# Patient Record
Sex: MALE | Race: WHITE | NOT HISPANIC OR LATINO
[De-identification: names, ages, dates, MRNs, and addresses within clinical notes are randomized per-mention and may not be internally consistent; named-entity substitution may affect disease eponyms.]

---

## 2017-09-11 ENCOUNTER — APPOINTMENT (OUTPATIENT)
Dept: VASCULAR SURGERY | Facility: CLINIC | Age: 62
End: 2017-09-11
Payer: MEDICAID

## 2017-09-11 VITALS
TEMPERATURE: 98 F | DIASTOLIC BLOOD PRESSURE: 94 MMHG | BODY MASS INDEX: 28.93 KG/M2 | HEART RATE: 70 BPM | HEIGHT: 66 IN | WEIGHT: 180 LBS | SYSTOLIC BLOOD PRESSURE: 167 MMHG

## 2017-09-11 DIAGNOSIS — N18.6 END STAGE RENAL DISEASE: ICD-10-CM

## 2017-09-11 PROCEDURE — G0365: CPT

## 2017-09-11 PROCEDURE — 99204 OFFICE O/P NEW MOD 45 MIN: CPT | Mod: 25

## 2017-09-11 RX ORDER — CALCIUM ACETATE 667 MG/1
667 CAPSULE ORAL
Qty: 90 | Refills: 0 | Status: ACTIVE | COMMUNITY
Start: 2017-03-26

## 2017-09-11 RX ORDER — AMPICILLIN TRIHYDRATE 250 MG
1000 CAPSULE ORAL
Qty: 30 | Refills: 0 | Status: ACTIVE | COMMUNITY
Start: 2017-03-21

## 2017-09-11 RX ORDER — ERGOCALCIFEROL 1.25 MG/1
1.25 MG CAPSULE, LIQUID FILLED ORAL
Qty: 4 | Refills: 0 | Status: ACTIVE | COMMUNITY
Start: 2017-04-17

## 2017-09-11 RX ORDER — ERYTHROPOIETIN 20000 [IU]/ML
20000 INJECTION, SOLUTION INTRAVENOUS; SUBCUTANEOUS
Qty: 4 | Refills: 0 | Status: ACTIVE | COMMUNITY
Start: 2017-04-05

## 2017-09-11 RX ORDER — DOCUSATE SODIUM 100 MG/1
100 CAPSULE, LIQUID FILLED ORAL
Qty: 30 | Refills: 0 | Status: ACTIVE | COMMUNITY
Start: 2017-04-17

## 2017-09-11 RX ORDER — FLUTICASONE PROPIONATE 50 UG/1
50 SPRAY, METERED NASAL
Qty: 16 | Refills: 0 | Status: ACTIVE | COMMUNITY
Start: 2017-03-30

## 2017-09-11 RX ORDER — SODIUM BICARBONATE 650 MG/1
650 TABLET ORAL
Qty: 120 | Refills: 0 | Status: ACTIVE | COMMUNITY
Start: 2017-04-05

## 2017-09-11 RX ORDER — PARICALCITOL 2 UG/1
2 CAPSULE ORAL
Qty: 30 | Refills: 0 | Status: ACTIVE | COMMUNITY
Start: 2017-03-26

## 2017-09-11 RX ORDER — CHLORHEXIDINE GLUCONATE 4 %
325 (65 FE) LIQUID (ML) TOPICAL
Qty: 30 | Refills: 0 | Status: ACTIVE | COMMUNITY
Start: 2017-04-17

## 2017-09-11 RX ORDER — LORATADINE 10 MG/1
10 TABLET ORAL
Qty: 30 | Refills: 0 | Status: ACTIVE | COMMUNITY
Start: 2017-03-30

## 2017-09-11 RX ORDER — ASPIRIN ENTERIC COATED TABLETS 81 MG 81 MG/1
81 TABLET, DELAYED RELEASE ORAL
Qty: 30 | Refills: 0 | Status: ACTIVE | COMMUNITY
Start: 2017-03-21

## 2017-09-28 ENCOUNTER — OUTPATIENT (OUTPATIENT)
Dept: OUTPATIENT SERVICES | Facility: HOSPITAL | Age: 62
LOS: 1 days | End: 2017-09-28
Payer: MEDICAID

## 2017-09-28 VITALS
DIASTOLIC BLOOD PRESSURE: 77 MMHG | SYSTOLIC BLOOD PRESSURE: 127 MMHG | HEART RATE: 81 BPM | WEIGHT: 175.05 LBS | HEIGHT: 68 IN | RESPIRATION RATE: 16 BRPM | TEMPERATURE: 99 F | OXYGEN SATURATION: 98 %

## 2017-09-28 DIAGNOSIS — Z01.818 ENCOUNTER FOR OTHER PREPROCEDURAL EXAMINATION: ICD-10-CM

## 2017-09-28 DIAGNOSIS — N18.6 END STAGE RENAL DISEASE: ICD-10-CM

## 2017-09-28 DIAGNOSIS — I10 ESSENTIAL (PRIMARY) HYPERTENSION: ICD-10-CM

## 2017-09-28 LAB
HCT VFR BLD CALC: 31.6 % — LOW (ref 39–50)
HGB BLD-MCNC: 9.9 G/DL — LOW (ref 13–17)
MCHC RBC-ENTMCNC: 26.8 PG — LOW (ref 27–34)
MCHC RBC-ENTMCNC: 31.3 GM/DL — LOW (ref 32–36)
MCV RBC AUTO: 85.6 FL — SIGNIFICANT CHANGE UP (ref 80–100)
PLATELET # BLD AUTO: 264 K/UL — SIGNIFICANT CHANGE UP (ref 150–400)
RBC # BLD: 3.69 M/UL — LOW (ref 4.2–5.8)
RBC # FLD: 15.7 % — HIGH (ref 10.3–14.5)
WBC # BLD: 8.06 K/UL — SIGNIFICANT CHANGE UP (ref 3.8–10.5)
WBC # FLD AUTO: 8.06 K/UL — SIGNIFICANT CHANGE UP (ref 3.8–10.5)

## 2017-09-28 PROCEDURE — 85027 COMPLETE CBC AUTOMATED: CPT

## 2017-09-28 PROCEDURE — 80053 COMPREHEN METABOLIC PANEL: CPT

## 2017-09-28 PROCEDURE — G0463: CPT

## 2017-09-28 RX ORDER — LIDOCAINE HCL 20 MG/ML
0.2 VIAL (ML) INJECTION ONCE
Qty: 0 | Refills: 0 | Status: DISCONTINUED | OUTPATIENT
Start: 2017-10-09 | End: 2017-10-24

## 2017-09-28 RX ORDER — SODIUM CHLORIDE 9 MG/ML
3 INJECTION INTRAMUSCULAR; INTRAVENOUS; SUBCUTANEOUS EVERY 8 HOURS
Qty: 0 | Refills: 0 | Status: DISCONTINUED | OUTPATIENT
Start: 2017-10-09 | End: 2017-10-24

## 2017-09-28 NOTE — H&P PST ADULT - NSANTHOSAYNRD_GEN_A_CORE
Neck 16.25 in./No. JENNY screening performed.  STOP BANG Legend: 0-2 = LOW Risk; 3-4 = INTERMEDIATE Risk; 5-8 = HIGH Risk

## 2017-09-28 NOTE — H&P PST ADULT - HISTORY OF PRESENT ILLNESS
This is a 61 y/o male, Senegalese speaking only. PMH:  HTN, ESRD: on Hemodialysis 3 X weekly: Mon/Wed / Fri. *plan: Last Dialysis preop is on a.m. of surgery, MTHFR, IgA Neuropathy. Scheduled: Left Upper Extremity Brachial Cephalic AV Fistula.

## 2017-09-28 NOTE — H&P PST ADULT - PROBLEM SELECTOR PLAN 1
Left Upper extremity Brachial Cephalic AV Fistula  plan:  STAT K= preop day of surgery   * Last Dialysis planned preop day of surgery @ [facility]

## 2017-09-28 NOTE — H&P PST ADULT - PMH
ESRD (end stage renal disease) on dialysis  Started Hemodialysis 8/2017 on Mon./ Wed/ Fri.  High cholesterol    HTN (hypertension)    IgA nephropathy    MTHFR mutation

## 2017-09-29 LAB
ALBUMIN SERPL ELPH-MCNC: 4.5 G/DL — SIGNIFICANT CHANGE UP (ref 3.3–5)
ALP SERPL-CCNC: 99 U/L — SIGNIFICANT CHANGE UP (ref 40–120)
ALT FLD-CCNC: 17 U/L — SIGNIFICANT CHANGE UP (ref 10–45)
ANION GAP SERPL CALC-SCNC: 19 MMOL/L — HIGH (ref 5–17)
AST SERPL-CCNC: 18 U/L — SIGNIFICANT CHANGE UP (ref 10–40)
BILIRUB SERPL-MCNC: 0.5 MG/DL — SIGNIFICANT CHANGE UP (ref 0.2–1.2)
BUN SERPL-MCNC: 49 MG/DL — HIGH (ref 7–23)
CALCIUM SERPL-MCNC: 8.1 MG/DL — LOW (ref 8.4–10.5)
CHLORIDE SERPL-SCNC: 94 MMOL/L — LOW (ref 96–108)
CO2 SERPL-SCNC: 27 MMOL/L — SIGNIFICANT CHANGE UP (ref 22–31)
CREAT SERPL-MCNC: 9.22 MG/DL — HIGH (ref 0.5–1.3)
GLUCOSE SERPL-MCNC: 85 MG/DL — SIGNIFICANT CHANGE UP (ref 70–99)
POTASSIUM SERPL-MCNC: 5.8 MMOL/L — HIGH (ref 3.5–5.3)
POTASSIUM SERPL-SCNC: 5.8 MMOL/L — HIGH (ref 3.5–5.3)
PROT SERPL-MCNC: 7.9 G/DL — SIGNIFICANT CHANGE UP (ref 6–8.3)
SODIUM SERPL-SCNC: 140 MMOL/L — SIGNIFICANT CHANGE UP (ref 135–145)

## 2017-10-09 ENCOUNTER — OUTPATIENT (OUTPATIENT)
Dept: OUTPATIENT SERVICES | Facility: HOSPITAL | Age: 62
LOS: 1 days | End: 2017-10-09
Payer: MEDICARE

## 2017-10-09 ENCOUNTER — TRANSCRIPTION ENCOUNTER (OUTPATIENT)
Age: 62
End: 2017-10-09

## 2017-10-09 ENCOUNTER — APPOINTMENT (OUTPATIENT)
Dept: VASCULAR SURGERY | Facility: HOSPITAL | Age: 62
End: 2017-10-09

## 2017-10-09 VITALS
HEIGHT: 68 IN | HEART RATE: 74 BPM | RESPIRATION RATE: 16 BRPM | WEIGHT: 175.05 LBS | TEMPERATURE: 98 F | OXYGEN SATURATION: 98 % | SYSTOLIC BLOOD PRESSURE: 164 MMHG | DIASTOLIC BLOOD PRESSURE: 94 MMHG

## 2017-10-09 VITALS
RESPIRATION RATE: 16 BRPM | OXYGEN SATURATION: 98 % | SYSTOLIC BLOOD PRESSURE: 156 MMHG | HEART RATE: 88 BPM | DIASTOLIC BLOOD PRESSURE: 76 MMHG | TEMPERATURE: 99 F

## 2017-10-09 DIAGNOSIS — N18.6 END STAGE RENAL DISEASE: ICD-10-CM

## 2017-10-09 PROCEDURE — 36820 AV FUSION/FOREARM VEIN: CPT | Mod: LT

## 2017-10-09 PROCEDURE — C1889: CPT

## 2017-10-09 RX ORDER — SODIUM CHLORIDE 9 MG/ML
1000 INJECTION INTRAMUSCULAR; INTRAVENOUS; SUBCUTANEOUS
Qty: 0 | Refills: 0 | Status: DISCONTINUED | OUTPATIENT
Start: 2017-10-09 | End: 2017-10-24

## 2017-10-09 RX ORDER — ONDANSETRON 8 MG/1
4 TABLET, FILM COATED ORAL ONCE
Qty: 0 | Refills: 0 | Status: DISCONTINUED | OUTPATIENT
Start: 2017-10-09 | End: 2017-10-24

## 2017-10-09 RX ORDER — AMLODIPINE BESYLATE 2.5 MG/1
1 TABLET ORAL
Qty: 0 | Refills: 0 | COMMUNITY

## 2017-10-09 RX ORDER — ACETAMINOPHEN 500 MG
1000 TABLET ORAL ONCE
Qty: 0 | Refills: 0 | Status: DISCONTINUED | OUTPATIENT
Start: 2017-10-09 | End: 2017-10-24

## 2017-10-09 NOTE — ASU DISCHARGE PLAN (ADULT/PEDIATRIC). - ITEMS TO FOLLOWUP WITH YOUR PHYSICIAN'S
Please follow up with Dr. Tillman 2 weeks after surgery. You may call [phone] to schedule an appointment.

## 2017-10-09 NOTE — ASU DISCHARGE PLAN (ADULT/PEDIATRIC). - SPECIAL INSTRUCTIONS
Do not remove steri strips. They will fall off on their own.  After showering, please pat steri strips dry. After surgery, some blood may escape from under the tape. The paper tape may fall off after several days. If not, they will be removed in the office.

## 2017-10-09 NOTE — ASU DISCHARGE PLAN (ADULT/PEDIATRIC). - NOTIFY
Inability to Tolerate Liquids or Foods/Numbness, color, or temperature change to extremity/Fever greater than 101/Bleeding that does not stop/Swelling that continues/Persistent Nausea and Vomiting/Increased Irritability or Sluggishness/Numbness, tingling/Excessive Diarrhea/Unable to Urinate/Pain not relieved by Medications

## 2017-10-09 NOTE — ASU DISCHARGE PLAN (ADULT/PEDIATRIC). - MEDICATION SUMMARY - MEDICATIONS TO TAKE
I will START or STAY ON the medications listed below when I get home from the hospital:    Asprin  -- 81 milligram(s) by mouth once a day. Remain on  -- Indication: For Home med    oxyCODONE-acetaminophen 5 mg-325 mg oral tablet  -- 1 tab(s) by mouth every 4 hours, As Needed for pain. MDD:6   -- Caution federal law prohibits the transfer of this drug to any person other  than the person for whom it was prescribed.  May cause drowsiness.  Alcohol may intensify this effect.  Use care when operating dangerous machinery.  This prescription cannot be refilled.  This product contains acetaminophen.  Do not use  with any other product containing acetaminophen to prevent possible liver damage.  Using more of this medication than prescribed may cause serious breathing problems.    -- Indication: For Postop pain    labetalol 200 mg oral tablet  -- 200 milligram(s) by mouth 2 times a day  -- Indication: For Home med    amLODIPine 10 mg oral tablet  -- 1 tab(s) by mouth once a day  -- Indication: For Home med    famotidine 20 mg oral tablet  -- 1 tab HS before and 1 tab am of surgery  -- Indication: For Home med    Renvela 800 mg oral tablet  -- 1 tab(s) by mouth 3 times a day (with meals)  -- Indication: For Home med I will START or STAY ON the medications listed below when I get home from the hospital:    Asprin  -- 81 milligram(s) by mouth once a day. Remain on  -- Indication: For Home med    oxyCODONE-acetaminophen 5 mg-325 mg oral tablet  -- 1 tab(s) by mouth every 4 hours, As Needed for pain. MDD:6  -- Caution federal law prohibits the transfer of this drug to any person other  than the person for whom it was prescribed.  May cause drowsiness.  Alcohol may intensify this effect.  Use care when operating dangerous machinery.  This prescription cannot be refilled.  This product contains acetaminophen.  Do not use  with any other product containing acetaminophen to prevent possible liver damage.  Using more of this medication than prescribed may cause serious breathing problems.    -- Indication: For Postop pain    labetalol 200 mg oral tablet  -- 200 milligram(s) by mouth 2 times a day  -- Indication: For Home med    amLODIPine 10 mg oral tablet  -- 1 tab(s) by mouth once a day  -- Indication: For Home med    famotidine 20 mg oral tablet  -- 1 tab HS before and 1 tab am of surgery  -- Indication: For Home med    Renvela 800 mg oral tablet  -- 1 tab(s) by mouth 3 times a day (with meals)  -- Indication: For Home med

## 2017-11-02 ENCOUNTER — APPOINTMENT (OUTPATIENT)
Dept: VASCULAR SURGERY | Facility: CLINIC | Age: 62
End: 2017-11-02
Payer: MEDICAID

## 2017-11-02 ENCOUNTER — APPOINTMENT (OUTPATIENT)
Dept: VASCULAR SURGERY | Facility: CLINIC | Age: 62
End: 2017-11-02

## 2017-11-02 PROCEDURE — 93990 DOPPLER FLOW TESTING: CPT

## 2017-11-20 ENCOUNTER — FORM ENCOUNTER (OUTPATIENT)
Age: 62
End: 2017-11-20

## 2017-11-21 ENCOUNTER — APPOINTMENT (OUTPATIENT)
Age: 62
End: 2017-11-21
Payer: MEDICAID

## 2017-11-21 PROCEDURE — 36909Z: CUSTOM

## 2017-11-21 PROCEDURE — 36011Z: CUSTOM | Mod: 59

## 2017-11-21 PROCEDURE — 36902Z: CUSTOM | Mod: 59

## 2017-12-03 NOTE — H&P PST ADULT - RESPIRATORY AND THORAX
Airway patent, Nasal mucosa clear. Mouth with normal mucosa. Throat has no vesicles, no oropharyngeal exudates and uvula is midline. negative

## 2017-12-05 ENCOUNTER — FORM ENCOUNTER (OUTPATIENT)
Age: 62
End: 2017-12-05

## 2017-12-06 ENCOUNTER — APPOINTMENT (OUTPATIENT)
Age: 62
End: 2017-12-06
Payer: MEDICARE

## 2017-12-06 PROCEDURE — 36902Z: CUSTOM | Mod: 58,59

## 2017-12-06 PROCEDURE — 36011Z: CUSTOM | Mod: 59

## 2017-12-06 PROCEDURE — 36215Z: CUSTOM | Mod: 59

## 2017-12-06 PROCEDURE — 36909Z: CUSTOM

## 2017-12-14 ENCOUNTER — APPOINTMENT (OUTPATIENT)
Age: 62
End: 2017-12-14

## 2017-12-28 ENCOUNTER — APPOINTMENT (OUTPATIENT)
Age: 62
End: 2017-12-28
Payer: MEDICAID

## 2017-12-28 PROCEDURE — 36589 REMOVAL TUNNELED CV CATH: CPT

## 2018-01-16 ENCOUNTER — APPOINTMENT (OUTPATIENT)
Age: 63
End: 2018-01-16

## 2018-02-01 ENCOUNTER — APPOINTMENT (OUTPATIENT)
Age: 63
End: 2018-02-01

## 2018-02-26 ENCOUNTER — APPOINTMENT (OUTPATIENT)
Dept: VASCULAR SURGERY | Facility: CLINIC | Age: 63
End: 2018-02-26

## 2018-04-17 ENCOUNTER — FORM ENCOUNTER (OUTPATIENT)
Age: 63
End: 2018-04-17

## 2018-04-18 ENCOUNTER — APPOINTMENT (OUTPATIENT)
Age: 63
End: 2018-04-18
Payer: MEDICARE

## 2018-04-18 PROCEDURE — 36906Z: CUSTOM

## 2018-04-18 PROCEDURE — 36215Z: CUSTOM | Mod: 59

## 2018-04-20 ENCOUNTER — APPOINTMENT (OUTPATIENT)
Dept: VASCULAR SURGERY | Facility: CLINIC | Age: 63
End: 2018-04-20

## 2018-04-23 ENCOUNTER — APPOINTMENT (OUTPATIENT)
Dept: VASCULAR SURGERY | Facility: CLINIC | Age: 63
End: 2018-04-23
Payer: MEDICARE

## 2018-04-23 PROCEDURE — 93990 DOPPLER FLOW TESTING: CPT

## 2018-04-23 PROCEDURE — 99214 OFFICE O/P EST MOD 30 MIN: CPT

## 2018-06-04 ENCOUNTER — APPOINTMENT (OUTPATIENT)
Dept: VASCULAR SURGERY | Facility: CLINIC | Age: 63
End: 2018-06-04
Payer: MEDICARE

## 2018-06-04 VITALS
DIASTOLIC BLOOD PRESSURE: 74 MMHG | BODY MASS INDEX: 30.12 KG/M2 | WEIGHT: 187.39 LBS | HEIGHT: 66 IN | HEART RATE: 73 BPM | SYSTOLIC BLOOD PRESSURE: 142 MMHG

## 2018-06-04 PROCEDURE — 93990 DOPPLER FLOW TESTING: CPT

## 2018-06-04 PROCEDURE — 99214 OFFICE O/P EST MOD 30 MIN: CPT

## 2018-10-08 ENCOUNTER — APPOINTMENT (OUTPATIENT)
Dept: VASCULAR SURGERY | Facility: CLINIC | Age: 63
End: 2018-10-08
Payer: MEDICARE

## 2018-10-08 DIAGNOSIS — Z09 ENCOUNTER FOR FOLLOW-UP EXAMINATION AFTER COMPLETED TREATMENT FOR CONDITIONS OTHER THAN MALIGNANT NEOPLASM: ICD-10-CM

## 2018-10-08 DIAGNOSIS — I77.0 ARTERIOVENOUS FISTULA, ACQUIRED: ICD-10-CM

## 2018-10-08 PROCEDURE — 99214 OFFICE O/P EST MOD 30 MIN: CPT

## 2018-10-08 PROCEDURE — 93990 DOPPLER FLOW TESTING: CPT

## 2018-10-11 PROBLEM — E72.12 METHYLENETETRAHYDROFOLATE REDUCTASE DEFICIENCY: Chronic | Status: ACTIVE | Noted: 2017-09-28

## 2018-10-29 ENCOUNTER — FORM ENCOUNTER (OUTPATIENT)
Age: 63
End: 2018-10-29

## 2018-10-30 ENCOUNTER — APPOINTMENT (OUTPATIENT)
Dept: ENDOVASCULAR SURGERY | Facility: CLINIC | Age: 63
End: 2018-10-30
Payer: MEDICARE

## 2018-10-30 PROCEDURE — 36215Z: CUSTOM | Mod: 59

## 2018-10-30 PROCEDURE — 36902Z: CUSTOM

## 2019-01-20 ENCOUNTER — INPATIENT (INPATIENT)
Facility: HOSPITAL | Age: 64
LOS: 0 days | Discharge: HOME | DRG: 152 | End: 2019-01-21
Attending: INTERNAL MEDICINE | Admitting: INTERNAL MEDICINE
Payer: COMMERCIAL

## 2019-01-20 VITALS
HEART RATE: 76 BPM | RESPIRATION RATE: 18 BRPM | TEMPERATURE: 97 F | SYSTOLIC BLOOD PRESSURE: 150 MMHG | OXYGEN SATURATION: 100 % | DIASTOLIC BLOOD PRESSURE: 77 MMHG | WEIGHT: 169.98 LBS | HEIGHT: 69 IN

## 2019-01-20 DIAGNOSIS — I25.10 ATHEROSCLEROTIC HEART DISEASE OF NATIVE CORONARY ARTERY WITHOUT ANGINA PECTORIS: ICD-10-CM

## 2019-01-20 DIAGNOSIS — R07.9 CHEST PAIN, UNSPECIFIED: ICD-10-CM

## 2019-01-20 DIAGNOSIS — N18.6 END STAGE RENAL DISEASE: ICD-10-CM

## 2019-01-20 DIAGNOSIS — Z29.9 ENCOUNTER FOR PROPHYLACTIC MEASURES, UNSPECIFIED: ICD-10-CM

## 2019-01-20 DIAGNOSIS — I10 ESSENTIAL (PRIMARY) HYPERTENSION: ICD-10-CM

## 2019-01-20 LAB
ALBUMIN SERPL ELPH-MCNC: 3.4 G/DL — LOW (ref 3.5–5)
ALP SERPL-CCNC: 80 U/L — SIGNIFICANT CHANGE UP (ref 40–120)
ALT FLD-CCNC: 18 U/L DA — SIGNIFICANT CHANGE UP (ref 10–60)
ANION GAP SERPL CALC-SCNC: 13 MMOL/L — SIGNIFICANT CHANGE UP (ref 5–17)
APTT BLD: 29.5 SEC — SIGNIFICANT CHANGE UP (ref 27.5–36.3)
AST SERPL-CCNC: 11 U/L — SIGNIFICANT CHANGE UP (ref 10–40)
BASE EXCESS BLDA CALC-SCNC: 1.2 MMOL/L — SIGNIFICANT CHANGE UP (ref -2–2)
BASOPHILS # BLD AUTO: 0.1 K/UL — SIGNIFICANT CHANGE UP (ref 0–0.2)
BASOPHILS NFR BLD AUTO: 1.1 % — SIGNIFICANT CHANGE UP (ref 0–2)
BILIRUB SERPL-MCNC: 0.6 MG/DL — SIGNIFICANT CHANGE UP (ref 0.2–1.2)
BLOOD GAS COMMENTS ARTERIAL: SIGNIFICANT CHANGE UP
BUN SERPL-MCNC: 64 MG/DL — HIGH (ref 7–18)
CALCIUM SERPL-MCNC: 8.2 MG/DL — LOW (ref 8.4–10.5)
CHLORIDE SERPL-SCNC: 99 MMOL/L — SIGNIFICANT CHANGE UP (ref 96–108)
CK MB BLD-MCNC: <1.2 % — SIGNIFICANT CHANGE UP (ref 0–3.5)
CK MB BLD-MCNC: <1.4 % — SIGNIFICANT CHANGE UP (ref 0–3.5)
CK MB BLD-MCNC: <1.9 % — SIGNIFICANT CHANGE UP (ref 0–3.5)
CK MB CFR SERPL CALC: <1 NG/ML — SIGNIFICANT CHANGE UP (ref 0–3.6)
CK SERPL-CCNC: 54 U/L — SIGNIFICANT CHANGE UP (ref 35–232)
CK SERPL-CCNC: 71 U/L — SIGNIFICANT CHANGE UP (ref 35–232)
CK SERPL-CCNC: 86 U/L — SIGNIFICANT CHANGE UP (ref 35–232)
CO2 SERPL-SCNC: 26 MMOL/L — SIGNIFICANT CHANGE UP (ref 22–31)
CREAT SERPL-MCNC: 11.1 MG/DL — HIGH (ref 0.5–1.3)
EOSINOPHIL # BLD AUTO: 0.4 K/UL — SIGNIFICANT CHANGE UP (ref 0–0.5)
EOSINOPHIL NFR BLD AUTO: 3.9 % — SIGNIFICANT CHANGE UP (ref 0–6)
FLU A RESULT: SIGNIFICANT CHANGE UP
FLU A RESULT: SIGNIFICANT CHANGE UP
FLUAV AG NPH QL: SIGNIFICANT CHANGE UP
FLUBV AG NPH QL: SIGNIFICANT CHANGE UP
GLUCOSE SERPL-MCNC: 100 MG/DL — HIGH (ref 70–99)
HCO3 BLDA-SCNC: 26 MMOL/L — SIGNIFICANT CHANGE UP (ref 23–27)
HCT VFR BLD CALC: 36.9 % — LOW (ref 39–50)
HGB BLD-MCNC: 11.3 G/DL — LOW (ref 13–17)
HOROWITZ INDEX BLDA+IHG-RTO: 40 — SIGNIFICANT CHANGE UP
INR BLD: 0.97 RATIO — SIGNIFICANT CHANGE UP (ref 0.88–1.16)
LYMPHOCYTES # BLD AUTO: 1.2 K/UL — SIGNIFICANT CHANGE UP (ref 1–3.3)
LYMPHOCYTES # BLD AUTO: 12.5 % — LOW (ref 13–44)
MCHC RBC-ENTMCNC: 29.4 PG — SIGNIFICANT CHANGE UP (ref 27–34)
MCHC RBC-ENTMCNC: 30.7 GM/DL — LOW (ref 32–36)
MCV RBC AUTO: 95.7 FL — SIGNIFICANT CHANGE UP (ref 80–100)
MONOCYTES # BLD AUTO: 1.1 K/UL — HIGH (ref 0–0.9)
MONOCYTES NFR BLD AUTO: 10.9 % — SIGNIFICANT CHANGE UP (ref 2–14)
NEUTROPHILS # BLD AUTO: 7 K/UL — SIGNIFICANT CHANGE UP (ref 1.8–7.4)
NEUTROPHILS NFR BLD AUTO: 71.6 % — SIGNIFICANT CHANGE UP (ref 43–77)
PCO2 BLDA: 43 MMHG — SIGNIFICANT CHANGE UP (ref 32–46)
PH BLDA: 7.39 — SIGNIFICANT CHANGE UP (ref 7.35–7.45)
PLATELET # BLD AUTO: 175 K/UL — SIGNIFICANT CHANGE UP (ref 150–400)
PO2 BLDA: 95 MMHG — SIGNIFICANT CHANGE UP (ref 74–108)
POTASSIUM SERPL-MCNC: 4.9 MMOL/L — SIGNIFICANT CHANGE UP (ref 3.5–5.3)
POTASSIUM SERPL-SCNC: 4.9 MMOL/L — SIGNIFICANT CHANGE UP (ref 3.5–5.3)
PROT SERPL-MCNC: 7.7 G/DL — SIGNIFICANT CHANGE UP (ref 6–8.3)
PROTHROM AB SERPL-ACNC: 10.7 SEC — SIGNIFICANT CHANGE UP (ref 10–12.9)
RBC # BLD: 3.86 M/UL — LOW (ref 4.2–5.8)
RBC # FLD: 13.6 % — SIGNIFICANT CHANGE UP (ref 10.3–14.5)
RSV RESULT: SIGNIFICANT CHANGE UP
RSV RNA RESP QL NAA+PROBE: SIGNIFICANT CHANGE UP
SAO2 % BLDA: 96 % — SIGNIFICANT CHANGE UP (ref 92–96)
SODIUM SERPL-SCNC: 138 MMOL/L — SIGNIFICANT CHANGE UP (ref 135–145)
TROPONIN I SERPL-MCNC: <0.015 NG/ML — SIGNIFICANT CHANGE UP (ref 0–0.04)
WBC # BLD: 9.7 K/UL — SIGNIFICANT CHANGE UP (ref 3.8–10.5)
WBC # FLD AUTO: 9.7 K/UL — SIGNIFICANT CHANGE UP (ref 3.8–10.5)

## 2019-01-20 PROCEDURE — 99285 EMERGENCY DEPT VISIT HI MDM: CPT | Mod: 25

## 2019-01-20 PROCEDURE — 71045 X-RAY EXAM CHEST 1 VIEW: CPT | Mod: 26

## 2019-01-20 RX ORDER — AMLODIPINE BESYLATE 2.5 MG/1
10 TABLET ORAL DAILY
Qty: 0 | Refills: 0 | Status: DISCONTINUED | OUTPATIENT
Start: 2019-01-20 | End: 2019-01-21

## 2019-01-20 RX ORDER — ASPIRIN/CALCIUM CARB/MAGNESIUM 324 MG
162 TABLET ORAL ONCE
Qty: 0 | Refills: 0 | Status: COMPLETED | OUTPATIENT
Start: 2019-01-20 | End: 2019-01-20

## 2019-01-20 RX ORDER — SEVELAMER CARBONATE 2400 MG/1
800 POWDER, FOR SUSPENSION ORAL
Qty: 0 | Refills: 0 | Status: DISCONTINUED | OUTPATIENT
Start: 2019-01-20 | End: 2019-01-21

## 2019-01-20 RX ORDER — CLOPIDOGREL BISULFATE 75 MG/1
75 TABLET, FILM COATED ORAL DAILY
Qty: 0 | Refills: 0 | Status: DISCONTINUED | OUTPATIENT
Start: 2019-01-20 | End: 2019-01-21

## 2019-01-20 RX ORDER — LABETALOL HCL 100 MG
200 TABLET ORAL
Qty: 0 | Refills: 0 | Status: DISCONTINUED | OUTPATIENT
Start: 2019-01-20 | End: 2019-01-21

## 2019-01-20 RX ORDER — SODIUM CHLORIDE 9 MG/ML
3 INJECTION INTRAMUSCULAR; INTRAVENOUS; SUBCUTANEOUS ONCE
Qty: 0 | Refills: 0 | Status: COMPLETED | OUTPATIENT
Start: 2019-01-20 | End: 2019-01-20

## 2019-01-20 RX ORDER — ASPIRIN/CALCIUM CARB/MAGNESIUM 324 MG
81 TABLET ORAL DAILY
Qty: 0 | Refills: 0 | Status: DISCONTINUED | OUTPATIENT
Start: 2019-01-20 | End: 2019-01-21

## 2019-01-20 RX ORDER — HYDRALAZINE HCL 50 MG
50 TABLET ORAL
Qty: 0 | Refills: 0 | Status: DISCONTINUED | OUTPATIENT
Start: 2019-01-20 | End: 2019-01-21

## 2019-01-20 RX ORDER — ACETAMINOPHEN 500 MG
650 TABLET ORAL EVERY 6 HOURS
Qty: 0 | Refills: 0 | Status: DISCONTINUED | OUTPATIENT
Start: 2019-01-20 | End: 2019-01-21

## 2019-01-20 RX ORDER — FAMOTIDINE 10 MG/ML
20 INJECTION INTRAVENOUS
Qty: 0 | Refills: 0 | Status: DISCONTINUED | OUTPATIENT
Start: 2019-01-20 | End: 2019-01-21

## 2019-01-20 RX ORDER — CALCIUM ACETATE 667 MG
667 TABLET ORAL
Qty: 0 | Refills: 0 | Status: DISCONTINUED | OUTPATIENT
Start: 2019-01-20 | End: 2019-01-21

## 2019-01-20 RX ORDER — SEVELAMER CARBONATE 2400 MG/1
0 POWDER, FOR SUSPENSION ORAL
Qty: 0 | Refills: 0 | COMMUNITY

## 2019-01-20 RX ORDER — HEPARIN SODIUM 5000 [USP'U]/ML
5000 INJECTION INTRAVENOUS; SUBCUTANEOUS EVERY 12 HOURS
Qty: 0 | Refills: 0 | Status: DISCONTINUED | OUTPATIENT
Start: 2019-01-20 | End: 2019-01-21

## 2019-01-20 RX ORDER — CALCIUM ACETATE 667 MG
0 TABLET ORAL
Qty: 0 | Refills: 0 | COMMUNITY

## 2019-01-20 RX ORDER — HYDRALAZINE HCL 50 MG
0 TABLET ORAL
Qty: 0 | Refills: 0 | COMMUNITY

## 2019-01-20 RX ORDER — SEVELAMER CARBONATE 2400 MG/1
1 POWDER, FOR SUSPENSION ORAL
Qty: 0 | Refills: 0 | COMMUNITY

## 2019-01-20 RX ORDER — FENTANYL CITRATE 50 UG/ML
25 INJECTION INTRAVENOUS ONCE
Qty: 0 | Refills: 0 | Status: DISCONTINUED | OUTPATIENT
Start: 2019-01-20 | End: 2019-01-20

## 2019-01-20 RX ORDER — ATORVASTATIN CALCIUM 80 MG/1
40 TABLET, FILM COATED ORAL AT BEDTIME
Qty: 0 | Refills: 0 | Status: DISCONTINUED | OUTPATIENT
Start: 2019-01-20 | End: 2019-01-21

## 2019-01-20 RX ORDER — NITROGLYCERIN 6.5 MG
0.4 CAPSULE, EXTENDED RELEASE ORAL
Qty: 0 | Refills: 0 | Status: DISCONTINUED | OUTPATIENT
Start: 2019-01-20 | End: 2019-01-21

## 2019-01-20 RX ADMIN — Medication 667 MILLIGRAM(S): at 11:50

## 2019-01-20 RX ADMIN — Medication 650 MILLIGRAM(S): at 15:03

## 2019-01-20 RX ADMIN — ATORVASTATIN CALCIUM 40 MILLIGRAM(S): 80 TABLET, FILM COATED ORAL at 21:55

## 2019-01-20 RX ADMIN — Medication 650 MILLIGRAM(S): at 15:42

## 2019-01-20 RX ADMIN — SEVELAMER CARBONATE 800 MILLIGRAM(S): 2400 POWDER, FOR SUSPENSION ORAL at 11:51

## 2019-01-20 RX ADMIN — FAMOTIDINE 20 MILLIGRAM(S): 10 INJECTION INTRAVENOUS at 19:06

## 2019-01-20 RX ADMIN — HEPARIN SODIUM 5000 UNIT(S): 5000 INJECTION INTRAVENOUS; SUBCUTANEOUS at 18:40

## 2019-01-20 RX ADMIN — FENTANYL CITRATE 25 MICROGRAM(S): 50 INJECTION INTRAVENOUS at 07:14

## 2019-01-20 RX ADMIN — SODIUM CHLORIDE 3 MILLILITER(S): 9 INJECTION INTRAMUSCULAR; INTRAVENOUS; SUBCUTANEOUS at 06:48

## 2019-01-20 RX ADMIN — CLOPIDOGREL BISULFATE 75 MILLIGRAM(S): 75 TABLET, FILM COATED ORAL at 11:50

## 2019-01-20 RX ADMIN — Medication 200 MILLIGRAM(S): at 18:40

## 2019-01-20 RX ADMIN — Medication 81 MILLIGRAM(S): at 11:49

## 2019-01-20 RX ADMIN — Medication 650 MILLIGRAM(S): at 21:55

## 2019-01-20 RX ADMIN — Medication 0.4 MILLIGRAM(S): at 06:48

## 2019-01-20 RX ADMIN — Medication 50 MILLIGRAM(S): at 11:50

## 2019-01-20 RX ADMIN — FENTANYL CITRATE 25 MICROGRAM(S): 50 INJECTION INTRAVENOUS at 07:47

## 2019-01-20 RX ADMIN — Medication 667 MILLIGRAM(S): at 18:40

## 2019-01-20 RX ADMIN — Medication 162 MILLIGRAM(S): at 06:43

## 2019-01-20 RX ADMIN — SEVELAMER CARBONATE 800 MILLIGRAM(S): 2400 POWDER, FOR SUSPENSION ORAL at 19:06

## 2019-01-20 RX ADMIN — Medication 650 MILLIGRAM(S): at 22:15

## 2019-01-20 NOTE — H&P ADULT - PROBLEM SELECTOR PLAN 5
IMPROVE VTE Individual Risk Assessment    RISK                                                          Points  [] Previous VTE                                           3  [] Thrombophilia                                        2  [] Lower limb paralysis                              2   [] Current Cancer                                       2   [x] Immobilization > 24 hrs                        1  [] ICU/CCU stay > 24 hours                       1  [x] Age > 60                                                   1    IMPROVE VTE Score: 2  on heparin for dvt ppx  no need for GI ppx

## 2019-01-20 NOTE — H&P ADULT - PROBLEM SELECTOR PLAN 1
p/w left cp, radiating to Lt shoulder and sob  HEART score 5 , will r/o ACS  on telemetry  on asa, lipitor and labetalol  f/u cardiac enzyme and TTE   cardio DR. ? p/w left cp, radiating to Lt shoulder and sob  recent CAD s/p stents x2 at [facility], on asa and plavix  HEART score 5 , will r/o ACS  on telemetry  on asa, lipitor and labetalol  f/u cardiac enzyme and TTE   f/u A1C , TSH and lipid profile   cardio DR. Berger

## 2019-01-20 NOTE — CONSULT NOTE ADULT - PROBLEM SELECTOR RECOMMENDATION 9
-Given recent h/o cardiac cath, would r/o ACS. Initial CE WNL and EKG does not show any ischemic changes. Will need to trend CE.  -Needs cardiology consult and contact cardiologist in [facility]  -Continue aspirin and plavix.  -Pt does not require BIPAP now  -Not a candidate for ICU at this time.

## 2019-01-20 NOTE — CONSULT NOTE ADULT - ATTENDING COMMENTS
Patient seen and examined with resident, Addendum to above.    64 y/o male with HTN, ESRD, CAD s/p PCI presented with chest pain and dyspnea. Initially patient requiring bipap support. Hemodynamically stable. At time of evaluation patient not in distress any more. Comfortable on nasal cannula. States chest pain is improving. No shortness of breath. He has received sub lingual nitro prior to eval. Not on bipap support any more. labs significant for elevated creatinine, in the setting of ESRD. Cardiac markers are negative.     Assessment:  1. CAD s/p PCI  2. ESRD   3. HTN     - R/o ACS   - Trend cardiac markers  - Currently not in distress any more  - Cont. Home meds, specially aspirin and plavix  - Consider cardiology evaluation  - HD per schedule  - At this time patient does not need ICU admission

## 2019-01-20 NOTE — H&P ADULT - ASSESSMENT
64 yo M from home with PMH of IgA nephropathy, ESRD on HD (MWF via LUE AVF at [facility], nephrologist DR. Esme Adams, last HD 01/18/19 Fri), HTN and CAD (S/P stents x2 one month ago, cardiologist DR. Jovani Benson) presented to ED c/o left chest pain sudden onset this am around 4 am. Pt describe it is pressure like pain, 7/10, intermitted, radiating to left shoulder, associated with dyspnea, denies palpitation, nausea, vomiting. Pt take asa and plavix at home for recent stents. Patient also c/o recent cold/flu in the past wk, cough improved. Pt denies headache, fever/chills, abd pain, n/v/c/d, dysuria or any other complaints.     ED course, pt vitals stable, pt was placed on bipap for couple hrs for respiratory distress, ABG 7.39/43/95/26 on BIpap Fio2 40%, off bipap planced on NC 2L , SO2 well. Labs noted Troponin x1 negative and EKG noted NSR at HR 87 and no st-t changes, QTc 478. CXR noted clear lungs, no change compared to prior CXR in 2015. s/p asa 162mg x1 in ED, currently chest pain improved. 62 yo M with PMH of IgA nephropathy, ESRD on HD (MWF via LUE AVF at [facility], nephrologist DR. Esme Adams, last HD 01/18/19 Fri), HTN and CAD (S/P stents x2 one month ago at [facility], cardiologist DR. Jovani Benson) presented to ED c/o left chest pain sudden onset this am around 4 am. Pt describe it is pressure like pain, 7/10, intermitted, radiating to left shoulder, associated with dyspnea, denies palpitation, nausea, vomiting. ED course, pt vitals stable, pt was placed on bipap for couple hrs for respiratory distress, ABG 7.39/43/95/26 on BIpap Fio2 40%, off bipap planced on NC 2L , SO2 well. Labs noted Troponin x1 negative and EKG noted NSR at HR 87 and no st-t changes, QTc 478. CXR noted clear lungs, no change compared to prior CXR in 2015. s/p asa 162mg x1 in ED, currently chest pain improved.

## 2019-01-20 NOTE — CONSULT NOTE ADULT - SUBJECTIVE AND OBJECTIVE BOX
Patient is a 63y old  Male who presents with a chief complaint of chest pain and SOB onset 5am today.      HPI: 63 years old [place]ian male from home lives with son, with PMH of ESRD(on HD, M/W/F through LUE fistula), HTN, and CAD s/p 2 stents on 12/26/18 at [facility] p/w a chief complaint of chest pain and SOB onset 5am today. Son helps translation and providing history at bedside. Patient says the chest pain is in the midsternal area and is squeezing in nature, but not radiating to the back. Per son, he recently had URI for 1 week with productive cough and subjective fever which has improved so far. ROS otherwise negative.    In ED, pt saturated 98% on room air but due to symptoms of SOB, patient was placed on BIPAP. CXR no significant findings of pleural effusion or consolidation, EKG with no ischemic changes, labs Cr 11 otherwise unremarkable, with normal cardiac enzyme. VS all stable.    Patient was consulted to ICU for possible need for continuation of BIPAP.      BRIEF HOSPITAL COURSE: Patient was weaned off to 4L NC initially, tolerated well with improved SOB, now on 2L without respiratory distress. Patient is anxious, concerning about his chest pain. VS stable.        PAST MEDICAL & SURGICAL HISTORY:  MTHFR mutation  ESRD (end stage renal disease) on dialysis: Started Hemodialysis 8/2017 on Mon./ Wed/ Fri.  IgA nephropathy  High cholesterol  HTN (hypertension)  No significant past surgical history    Allergies    No Known Allergies    Intolerances    ?? ACE inhibitors (Other (Moderate))    FAMILY HISTORY:  No pertinent family history in first degree relatives          Review of Systems:  CONSTITUTIONAL: No fever, chills, or fatigue  EYES: No eye pain, visual disturbances, or discharge  ENMT:  No difficulty hearing, tinnitus, vertigo; No sinus or throat pain  NECK: No pain or stiffness  RESPIRATORY: No cough, wheezing, chills or hemoptysis; mild shortness of breath  CARDIOVASCULAR: Midsternal chest pain, no palpitations, dizziness, or leg swelling  GASTROINTESTINAL: No abdominal or epigastric pain. No nausea, vomiting, or hematemesis; No diarrhea or constipation. No melena or hematochezia.  GENITOURINARY: No dysuria, frequency, hematuria, or incontinence  NEUROLOGICAL: No headaches, memory loss, loss of strength, numbness, or tremors  SKIN: No itching, burning, rashes, or lesions   MUSCULOSKELETAL: No joint pain or swelling; No muscle, back, or extremity pain  PSYCHIATRIC: No depression, + anxiety, no mood swings, or difficulty sleeping      Medications:  nitroglycerin     SubLingual 0.4 milliGRAM(s) SubLingual every 5 minutes PRN      vent settings      Vital Signs Last 24 Hrs  T(C): 36.3 (20 Jan 2019 06:23), Max: 36.3 (20 Jan 2019 06:23)  T(F): 97.4 (20 Jan 2019 06:23), Max: 97.4 (20 Jan 2019 06:23)  HR: 81 (20 Jan 2019 08:44) (76 - 81)  BP: 139/74 (20 Jan 2019 08:44) (107/61 - 150/77)  BP(mean): --  RR: 18 (20 Jan 2019 08:44) (12 - 18)  SpO2: 99% (20 Jan 2019 08:44) (99% - 100%)    ABG - ( 20 Jan 2019 07:32 )  pH, Arterial: 7.39  pH, Blood: x     /  pCO2: 43    /  pO2: 95    / HCO3: 26    / Base Excess: 1.2   /  SaO2: 96                LABS:                        11.3   9.7   )-----------( 175      ( 20 Jan 2019 06:53 )             36.9     01-20    138  |  99  |  64<H>  ----------------------------<  100<H>  4.9   |  26  |  11.10<H>    Ca    8.2<L>      20 Jan 2019 06:53    TPro  7.7  /  Alb  3.4<L>  /  TBili  0.6  /  DBili  x   /  AST  11  /  ALT  18  /  AlkPhos  80  01-20      CARDIAC MARKERS ( 20 Jan 2019 06:53 )  <0.015 ng/mL / x     / 86 U/L / x     / <1.0 ng/mL      CAPILLARY BLOOD GLUCOSE        PT/INR - ( 20 Jan 2019 06:53 )   PT: 10.7 sec;   INR: 0.97 ratio         PTT - ( 20 Jan 2019 06:53 )  PTT:29.5 sec    CULTURES:        Physical Examination:    General: No acute distress.      HEENT: Pupils equal, reactive to light.  Symmetric.    PULM: Clear to auscultation bilaterally, no significant sputum production    CVS: Regular rate and rhythm, no murmurs, rubs, or gallops    ABD: Soft, nondistended, nontender, normoactive bowel sounds, no masses    EXT: No edema, nontender. LUE AVF bruit(+), thrill(+)    SKIN: Warm and well perfused, no rashes noted.    NEURO: Alert, oriented, interactive, nonfocal        RADIOLOGY REVIEWED : yes    CRITICAL CARE TIME SPENT: 35 minutes Patient is a 63y old  Male who presents with a chief complaint of chest pain and SOB onset 5am today.      HPI: 63 years old Haitian male from home lives with son, with PMH of ESRD(on HD, M/W/F through LUE fistula), HTN, and CAD s/p 2 stents on 12/26/18 at [facility] p/w a chief complaint of chest pain and SOB onset 5am today. Son helps translation and providing history at bedside. Patient says the chest pain is in the midsternal area and is squeezing in nature, but not radiating to the back. Per son, he recently had URI for 1 week with productive cough and subjective fever which has improved so far. ROS otherwise negative.    In ED, pt saturated 98% on room air but due to symptoms of SOB, patient was placed on BIPAP. CXR no significant findings of pleural effusion or consolidation, EKG with no ischemic changes, labs Cr 11 otherwise unremarkable, with normal cardiac enzyme. VS all stable.    Patient was consulted to ICU for possible need for continuation of BIPAP.      BRIEF HOSPITAL COURSE: Patient was weaned off to 4L NC initially, tolerated well with improved SOB, now on 2L without respiratory distress. Patient is anxious, concerning about his chest pain. VS stable.        PAST MEDICAL & SURGICAL HISTORY:  MTHFR mutation  ESRD (end stage renal disease) on dialysis: Started Hemodialysis 8/2017 on Mon./ Wed/ Fri.  IgA nephropathy  High cholesterol  HTN (hypertension)  No significant past surgical history    Allergies    No Known Allergies    Intolerances    ?? ACE inhibitors (Other (Moderate))    FAMILY HISTORY:  No pertinent family history in first degree relatives    SOCIAL HISTORY: former smoker, 0.5 pack per day for 30 years denies alcohol abuse/drug abuse          Review of Systems:  CONSTITUTIONAL: No fever, chills, or fatigue  EYES: No eye pain, visual disturbances, or discharge  ENMT:  No difficulty hearing, tinnitus, vertigo; No sinus or throat pain  NECK: No pain or stiffness  RESPIRATORY: No cough, wheezing, chills or hemoptysis; mild shortness of breath  CARDIOVASCULAR: Midsternal chest pain, no palpitations, dizziness, or leg swelling  GASTROINTESTINAL: No abdominal or epigastric pain. No nausea, vomiting, or hematemesis; No diarrhea or constipation. No melena or hematochezia.  GENITOURINARY: No dysuria, frequency, hematuria, or incontinence  NEUROLOGICAL: No headaches, memory loss, loss of strength, numbness, or tremors  SKIN: No itching, burning, rashes, or lesions   MUSCULOSKELETAL: No joint pain or swelling; No muscle, back, or extremity pain  PSYCHIATRIC: No depression, + anxiety, no mood swings, or difficulty sleeping      Medications:  nitroglycerin     SubLingual 0.4 milliGRAM(s) SubLingual every 5 minutes PRN      vent settings      Vital Signs Last 24 Hrs  T(C): 36.3 (20 Jan 2019 06:23), Max: 36.3 (20 Jan 2019 06:23)  T(F): 97.4 (20 Jan 2019 06:23), Max: 97.4 (20 Jan 2019 06:23)  HR: 81 (20 Jan 2019 08:44) (76 - 81)  BP: 139/74 (20 Jan 2019 08:44) (107/61 - 150/77)  BP(mean): --  RR: 18 (20 Jan 2019 08:44) (12 - 18)  SpO2: 99% (20 Jan 2019 08:44) (99% - 100%)    ABG - ( 20 Jan 2019 07:32 )  pH, Arterial: 7.39  pH, Blood: x     /  pCO2: 43    /  pO2: 95    / HCO3: 26    / Base Excess: 1.2   /  SaO2: 96                LABS:                        11.3   9.7   )-----------( 175      ( 20 Jan 2019 06:53 )             36.9     01-20    138  |  99  |  64<H>  ----------------------------<  100<H>  4.9   |  26  |  11.10<H>    Ca    8.2<L>      20 Jan 2019 06:53    TPro  7.7  /  Alb  3.4<L>  /  TBili  0.6  /  DBili  x   /  AST  11  /  ALT  18  /  AlkPhos  80  01-20      CARDIAC MARKERS ( 20 Jan 2019 06:53 )  <0.015 ng/mL / x     / 86 U/L / x     / <1.0 ng/mL      CAPILLARY BLOOD GLUCOSE        PT/INR - ( 20 Jan 2019 06:53 )   PT: 10.7 sec;   INR: 0.97 ratio         PTT - ( 20 Jan 2019 06:53 )  PTT:29.5 sec    CULTURES:        Physical Examination:    General: No acute distress.      HEENT: Pupils equal, reactive to light.  Symmetric.    PULM: Clear to auscultation bilaterally, no significant sputum production    CVS: Regular rate and rhythm, no murmurs, rubs, or gallops    ABD: Soft, nondistended, nontender, normoactive bowel sounds, no masses    EXT: No edema, nontender. LUE AVF bruit(+), thrill(+)    SKIN: Warm and well perfused, no rashes noted.    NEURO: Alert, oriented, interactive, nonfocal        RADIOLOGY REVIEWED : yes    CRITICAL CARE TIME SPENT: 35 minutes

## 2019-01-20 NOTE — H&P ADULT - NSHPPHYSICALEXAM_GEN_ALL_CORE
Vital Signs Last 24 Hrs  T(C): 36.3 (20 Jan 2019 06:23), Max: 36.3 (20 Jan 2019 06:23)  T(F): 97.4 (20 Jan 2019 06:23), Max: 97.4 (20 Jan 2019 06:23)  HR: 81 (20 Jan 2019 08:44) (76 - 81)  BP: 139/74 (20 Jan 2019 08:44) (107/61 - 150/77)  BP(mean): --  RR: 18 (20 Jan 2019 08:44) (12 - 18)  SpO2: 99% (20 Jan 2019 08:44) (99% - 100%)

## 2019-01-20 NOTE — H&P ADULT - PROBLEM SELECTOR PLAN 2
ESRD on HD  MWF via LUE AVF at [facility], nephrologist DR. Esme Adams  last HD 01/18/19 Fri  due for HD tomorrow, no acute fluid overload  nephro . ? ESRD on HD  MWF via LUE AVF at [facility], nephrologist DR. Esme Adams  last HD 01/18/19 Fri  due for HD tomorrow, no acute fluid overload  nephro DR. Li ESRD on HD  MWF via LUE AVF at [facility], nephrologist DR. Esme Adams  last HD 01/18/19 Fri  due for HD tomorrow, no acute fluid overload  nephro DR. John ESRD on HD  MWF via LUE AVF at [facility], nephrologist DR. Esme Adams  last HD 01/18/19 Fri  due for HD tomorrow, no acute fluid overload  nephro DR. John/ DR. Leach

## 2019-01-20 NOTE — ED ADULT NURSE NOTE - ED STAT RN HANDOFF DETAILS
Patient was endorsed to ROSAURA Florez hemodynamically stable and on BIPAP . Pain medication requested from MD Perkins.  O2 saturation increased to 98% after BIPAP placement.

## 2019-01-20 NOTE — ED PROVIDER NOTE - OBJECTIVE STATEMENT
63 M with hx of CAD with stents, ESRD on dialysis, last got dialysis on Friday, presents with chest pain sudden onset tonight 1 hour PTA, with dyspnea.  Patient has no fever/chills.  No other complaints.

## 2019-01-20 NOTE — H&P ADULT - HISTORY OF PRESENT ILLNESS
64 yo M from home with PMH of ESRD on HD (MWF via LUE AVF, last HD 01/18/19 Fri), HTN and CAD (S/P stents x? 2 weeks ago) presented to ED c/o chest pain sudden onset tonight 1 hour PTA, with dyspnea.  Patient has no fever/chills.  No other complaints. 64 yo M from home with PMH of IgA nephropathy, ESRD on HD (MWF via LUE AVF at [facility], nephrologist DR. Esme Adams, last HD 01/18/19 Fri), HTN and CAD (S/P stents x2 one month ago, cardiologist DR. Jovani Benson) presented to ED c/o left chest pain sudden onset this am around 4 am. Pt describe it is pressure like pain, 7/10, intermitted, radiating to left shoulder, associated with dyspnea, denies palpitation, nausea, vomiting. Pt take asa and plavix at home for recent stents. Patient also c/o recent cold/flu in the past wk, cough improved. Pt denies headache, fever/chills, abd pain, n/v/c/d, dysuria or any other complaints.     ED course, pt vitals stable, pt was placed on bipap for couple hrs for respiratory distress, ABG 7.39/43/95/26 on BIpap Fio2 40%, off bipap planced on NC 2L , SO2 well. Labs noted Troponin x1 negative and EKG noted NSR at HR 87 and no st-t changes, QTc 478. CXR noted clear lungs, no change compared to prior CXR in 2015. s/p asa 162mg x1 in ED, currently chest pain improved.     GOC: discussed GOC with pt HCP lindsay Duncan at bedside, pt is full code. 62 yo M from home with PMH of IgA nephropathy, ESRD on HD (MWF via LUE AVF at [facility], nephrologist DR. Esme Adams, last HD 01/18/19 Fri), HTN and CAD (S/P stents x2 one month ago at [facility],  cardiologist DR. Jovani Benson) presented to ED c/o left chest pain sudden onset this am around 4 am. Pt describe it is pressure like pain, 7/10, intermitted, radiating to left shoulder, associated with dyspnea, denies palpitation, nausea, vomiting. Pt take asa and plavix at home for recent stents. Patient also c/o recent cold/flu in the past wk, cough improved. Pt denies headache, fever/chills, abd pain, n/v/c/d, dysuria or any other complaints.     ED course, pt vitals stable, pt was placed on bipap for couple hrs for respiratory distress, ABG 7.39/43/95/26 on BIpap Fio2 40%, off bipap planced on NC 2L , SO2 well. Labs noted Troponin x1 negative and EKG noted NSR at HR 87 and no st-t changes, QTc 478. CXR noted clear lungs, no change compared to prior CXR in 2015. s/p asa 162mg x1 in ED, currently chest pain improved.     GOC: discussed GOC with pt HCP lindsay Chaselan at bedside, pt is full code.

## 2019-01-20 NOTE — CONSULT NOTE ADULT - SUBJECTIVE AND OBJECTIVE BOX
HPI:  62 yo M from home with PMH of IgA nephropathy, ESRD on HD (MWF via LUE AVF at [facility], nephrologist DR. Esme Adams, last HD 01/18/19 Fri), HTN and CAD (S/P stents x2 one month ago at [facility],  cardiologist DR. Jovani Benson) presented to ED c/o left chest pain sudden onset this am around 4 am. Pt describe it is pressure like pain, 7/10, intermitted, radiating to left shoulder, associated with dyspnea, denies palpitation, nausea, vomiting. Pt take asa and plavix at home for recent stents. Patient also c/o recent cold/flu in the past wk, cough improved. Pt denies headache, fever/chills, abd pain, n/v/c/d, dysuria or any other complaints.     ED course, pt vitals stable, pt was placed on bipap for couple hrs for respiratory distress, ABG 7.39/43/95/26 on BIpap Fio2 40%, off bipap planced on NC 2L , SO2 well. Labs noted Troponin x1 negative and EKG noted NSR at HR 87 and no st-t changes, QTc 478. CXR noted clear lungs, no change compared to prior CXR in 2015. s/p asa 162mg x1 in ED, currently chest pain improved.     PMH:   MTHFR mutation  ESRD (end stage renal disease) on dialysis  IgA nephropathy  High cholesterol  HTN (hypertension)    PSH:   L/A AVF    FAMILY HISTORY:  No pertinent family history in first degree relatives    Social History:  non-smoker/ non-alcoholic     Home Meds:  MEDICATIONS  (STANDING):  amLODIPine   Tablet 10 milliGRAM(s) Oral daily  aspirin  chewable 81 milliGRAM(s) Oral daily  atorvastatin 40 milliGRAM(s) Oral at bedtime  calcium acetate 667 milliGRAM(s) Oral three times a day with meals  clopidogrel Tablet 75 milliGRAM(s) Oral daily  famotidine    Tablet 20 milliGRAM(s) Oral two times a day  heparin  Injectable 5000 Unit(s) SubCutaneous every 12 hours  hydrALAZINE 50 milliGRAM(s) Oral <User Schedule>  labetalol 200 milliGRAM(s) Oral two times a day  sevelamer carbonate Oral Powder - Peds 800 milliGRAM(s) Oral three times a day with meals    MEDICATIONS  (PRN):  acetaminophen   Tablet .. 650 milliGRAM(s) Oral every 6 hours PRN Temp greater or equal to 38C (100.4F), Mild Pain (1 - 3)  nitroglycerin     SubLingual 0.4 milliGRAM(s) SubLingual every 5 minutes PRN Chest Pain      Allergies:  No Known Allergies    Intolerances  ACE inhibitors (Other (Moderate))      REVIEW OF SYSTEMS:    CONSTITUTIONAL: No fever or chills  EYES: No eye pain, visual disturbances, or discharge  ENMT:  No throat pain  NECK: No pain or stiffness  RESPIRATORY: Positive shortness of breath. Denies cough  CARDIOVASCULAR: Positive for chest pain. No palpitations, dizziness, or leg swelling  GASTROINTESTINAL: No abdominal or epigastric pain. No nausea, vomiting, or diarrhea  GENITOURINARY: No dysuria, frequency, hematuria, or incontinence  NEUROLOGICAL: No headaches  SKIN: No itching, burning, rashes    Vital Signs Last 24 Hrs  T(C): 37.3 (20 Jan 2019 15:02), Max: 37.3 (20 Jan 2019 15:02)  T(F): 99.1 (20 Jan 2019 15:02), Max: 99.1 (20 Jan 2019 15:02)  HR: 93 (20 Jan 2019 15:02) (76 - 93)  BP: 148/72 (20 Jan 2019 15:02) (107/61 - 150/77)  BP(mean): --  RR: 20 (20 Jan 2019 15:02) (12 - 20)  SpO2: 96% (20 Jan 2019 15:02) (96% - 100%)    PHYSICAL EXAM:    GENERAL: NAD, well-nourished, well-developed  HEAD:  Atraumatic, Normocephalic  EYES: Sclera anicteric  ENMT: Moist mucous membranes  NECK: Supple, No JVD, Normal thyroid  NERVOUS SYSTEM:  Alert & Oriented X3  CHEST/LUNG: Clear   HEART: Regular rate and rhythm; No murmurs  ABDOMEN: Soft, Nontender, Nondistended; Bowel sounds present  EXTREMITIES:  No edema. L/A AVF positive bruit  SKIN: Normal turgor    LABS:                        11.3   9.7   )-----------( 175      ( 20 Jan 2019 06:53 )             36.9     01-20    138  |  99  |  64<H>  ----------------------------<  100<H>  4.9   |  26  |  11.10<H>    Ca    8.2<L>      20 Jan 2019 06:53  Phos  4.5     01-20  Mg     2.5     01-20    TPro  7.7  /  Alb  3.4<L>  /  TBili  0.6  /  DBili  x   /  AST  11  /  ALT  18  /  AlkPhos  80  01-20    PT/INR - ( 20 Jan 2019 06:53 )   PT: 10.7 sec;   INR: 0.97 ratio    PTT - ( 20 Jan 2019 06:53 )  PTT:29.5 sec    ASSESSMENT AND PLAN:  1. ESRD on HD. There is no indication for emergent HD  2. Anemia due to CKD. No need for Epogen  3. S/P CP  4. MBD  HD is scheduled for tomorrow  Keep patient euvolemic and renal diet  Adjust Medications according to eGFR  Cardiology evaluation is in progress  Obtain PO4, PTH  Follow H/H  Many thanks

## 2019-01-20 NOTE — H&P ADULT - NSHPLABSRESULTS_GEN_ALL_CORE
Complete Blood Count + Automated Diff (01.20.19 @ 06:53)    WBC Count: 9.7 K/uL    RBC Count: 3.86 M/uL    Hemoglobin: 11.3 g/dL    Hematocrit: 36.9 %    Mean Cell Volume: 95.7 fl    Mean Cell Hemoglobin: 29.4 pg    Mean Cell Hemoglobin Conc: 30.7 gm/dL    Red Cell Distrib Width: 13.6 %    Platelet Count - Automated: 175 K/uL    Auto Neutrophil #: 7.0 K/uL    Auto Lymphocyte #: 1.2 K/uL    Auto Monocyte #: 1.1 K/uL    Auto Eosinophil #: 0.4 K/uL    Auto Basophil #: 0.1 K/uL    Auto Neutrophil %: 71.6: Differential percentages must be correlated with absolute numbers for  clinical significance. %    Auto Lymphocyte %: 12.5 %    Auto Monocyte %: 10.9 %    Auto Eosinophil %: 3.9 %    Auto Basophil %: 1.1 %      Comprehensive Metabolic Panel (01.20.19 @ 06:53)    Sodium, Serum: 138 mmol/L    Potassium, Serum: 4.9 mmol/L    Chloride, Serum: 99 mmol/L    Carbon Dioxide, Serum: 26 mmol/L    Anion Gap, Serum: 13 mmol/L    Blood Urea Nitrogen, Serum: 64 mg/dL    Creatinine, Serum: 11.10 mg/dL    Glucose, Serum: 100 mg/dL    Calcium, Total Serum: 8.2 mg/dL    Protein Total, Serum: 7.7 g/dL    Albumin, Serum: 3.4 g/dL    Bilirubin Total, Serum: 0.6 mg/dL    Alkaline Phosphatase, Serum: 80 U/L    Aspartate Aminotransferase (AST/SGOT): 11 U/L    Alanine Aminotransferase (ALT/SGPT): 18 U/L DA    eGFR if Non : 4: Interpretative comment  The units for eGFR are mL/min/1.73M2 (normalized body surface area). The  eGFR is calculated from a serum creatinine using the CKD-EPI equation.  Other variables required for calculation are race, age and sex. Among  patients with chronic kidney disease (CKD), the eGFR is useful in  determining the stage of disease according to KDOQI CKD classification.  All eGFR results are reported numerically with the following  interpretation.          GFR                    With                 Without     (ml/min/1.73 m2)    Kidney Damage       Kidney Damage        >= 90                    Stage 1                     Normal        60-89                    Stage 2                     Decreased GFR        30-59     Stage 3                     Stage 3        15-29                    Stage 4                     Stage 4        < 15                      Stage 5                     Stage 5  Each stage of CKD assumes that the associated GFR level has been in  effect for at least 3 months. Determination of stages one and two (with  eGFR > 59 ml/min/m2) requires estimation of kidney damage for at least 3  months as defined by structural or functional abnormalities.  Limitations: All estimates of GFR will be less accurate for patients at  extremes of muscle mass (including but not limited to frail elderly,  critically ill, or cancer patients), those with unusual diets, and those  with conditions associated with reduced secretion or extrarenal  elimination of creatinine. The eGFR equation is not recommended for use  in patients with unstable creatinine levels. mL/min/1.73M2    eGFR if African American: 5 mL/min/1.73M2      < from: Xray Chest 1 View-PORTABLE IMMEDIATE (01.20.19 @ 06:46) >        < end of copied text >

## 2019-01-20 NOTE — ED ADULT NURSE NOTE - NSIMPLEMENTINTERV_GEN_ALL_ED
Implemented All Fall Risk Interventions:  [place] to call system. Call bell, personal items and telephone within reach. Instruct patient to call for assistance. Room bathroom lighting operational. Non-slip footwear when patient is off stretcher. Physically safe environment: no spills, clutter or unnecessary equipment. Stretcher in lowest position, wheels locked, appropriate side rails in place. Provide visual cue, wrist band, yellow gown, etc. Monitor gait and stability. Monitor for mental status changes and reorient to person, place, and time. Review medications for side effects contributing to fall risk. Reinforce activity limits and safety measures with patient and family.

## 2019-01-21 ENCOUNTER — TRANSCRIPTION ENCOUNTER (OUTPATIENT)
Age: 64
End: 2019-01-21

## 2019-01-21 VITALS
RESPIRATION RATE: 17 BRPM | SYSTOLIC BLOOD PRESSURE: 155 MMHG | OXYGEN SATURATION: 97 % | WEIGHT: 174.39 LBS | TEMPERATURE: 98 F | HEART RATE: 91 BPM | DIASTOLIC BLOOD PRESSURE: 82 MMHG

## 2019-01-21 PROCEDURE — 85730 THROMBOPLASTIN TIME PARTIAL: CPT

## 2019-01-21 PROCEDURE — 82550 ASSAY OF CK (CPK): CPT

## 2019-01-21 PROCEDURE — 82803 BLOOD GASES ANY COMBINATION: CPT

## 2019-01-21 PROCEDURE — 94660 CPAP INITIATION&MGMT: CPT

## 2019-01-21 PROCEDURE — 83735 ASSAY OF MAGNESIUM: CPT

## 2019-01-21 PROCEDURE — 93005 ELECTROCARDIOGRAM TRACING: CPT

## 2019-01-21 PROCEDURE — 82553 CREATINE MB FRACTION: CPT

## 2019-01-21 PROCEDURE — 93306 TTE W/DOPPLER COMPLETE: CPT

## 2019-01-21 PROCEDURE — 85027 COMPLETE CBC AUTOMATED: CPT

## 2019-01-21 PROCEDURE — 99285 EMERGENCY DEPT VISIT HI MDM: CPT | Mod: 25

## 2019-01-21 PROCEDURE — 84100 ASSAY OF PHOSPHORUS: CPT

## 2019-01-21 PROCEDURE — 87631 RESP VIRUS 3-5 TARGETS: CPT

## 2019-01-21 PROCEDURE — 85610 PROTHROMBIN TIME: CPT

## 2019-01-21 PROCEDURE — 36415 COLL VENOUS BLD VENIPUNCTURE: CPT

## 2019-01-21 PROCEDURE — 80053 COMPREHEN METABOLIC PANEL: CPT

## 2019-01-21 PROCEDURE — 71045 X-RAY EXAM CHEST 1 VIEW: CPT

## 2019-01-21 PROCEDURE — 83880 ASSAY OF NATRIURETIC PEPTIDE: CPT

## 2019-01-21 PROCEDURE — 71250 CT THORAX DX C-: CPT

## 2019-01-21 PROCEDURE — 96374 THER/PROPH/DIAG INJ IV PUSH: CPT

## 2019-01-21 PROCEDURE — 71250 CT THORAX DX C-: CPT | Mod: 26

## 2019-01-21 PROCEDURE — 84484 ASSAY OF TROPONIN QUANT: CPT

## 2019-01-21 PROCEDURE — 99261: CPT

## 2019-01-21 PROCEDURE — 90999 UNLISTED DIALYSIS PROCEDURE: CPT

## 2019-01-21 RX ORDER — AZITHROMYCIN 500 MG/1
500 TABLET, FILM COATED ORAL ONCE
Qty: 0 | Refills: 0 | Status: COMPLETED | OUTPATIENT
Start: 2019-01-21 | End: 2019-01-21

## 2019-01-21 RX ORDER — BENZOCAINE AND MENTHOL 5; 1 G/100ML; G/100ML
1 LIQUID ORAL THREE TIMES A DAY
Qty: 0 | Refills: 0 | Status: DISCONTINUED | OUTPATIENT
Start: 2019-01-21 | End: 2019-01-21

## 2019-01-21 RX ORDER — AZITHROMYCIN 500 MG/1
1 TABLET, FILM COATED ORAL
Qty: 5 | Refills: 0
Start: 2019-01-21 | End: 2019-01-25

## 2019-01-21 RX ADMIN — Medication 81 MILLIGRAM(S): at 14:13

## 2019-01-21 RX ADMIN — FAMOTIDINE 20 MILLIGRAM(S): 10 INJECTION INTRAVENOUS at 05:59

## 2019-01-21 RX ADMIN — Medication 200 MILLIGRAM(S): at 05:59

## 2019-01-21 RX ADMIN — AMLODIPINE BESYLATE 10 MILLIGRAM(S): 2.5 TABLET ORAL at 05:59

## 2019-01-21 RX ADMIN — Medication 667 MILLIGRAM(S): at 12:44

## 2019-01-21 RX ADMIN — SEVELAMER CARBONATE 800 MILLIGRAM(S): 2400 POWDER, FOR SUSPENSION ORAL at 08:42

## 2019-01-21 RX ADMIN — CLOPIDOGREL BISULFATE 75 MILLIGRAM(S): 75 TABLET, FILM COATED ORAL at 14:13

## 2019-01-21 RX ADMIN — Medication 200 MILLIGRAM(S): at 14:14

## 2019-01-21 RX ADMIN — SEVELAMER CARBONATE 800 MILLIGRAM(S): 2400 POWDER, FOR SUSPENSION ORAL at 12:44

## 2019-01-21 RX ADMIN — AZITHROMYCIN 500 MILLIGRAM(S): 500 TABLET, FILM COATED ORAL at 14:13

## 2019-01-21 RX ADMIN — Medication 667 MILLIGRAM(S): at 08:42

## 2019-01-21 RX ADMIN — HEPARIN SODIUM 5000 UNIT(S): 5000 INJECTION INTRAVENOUS; SUBCUTANEOUS at 05:59

## 2019-01-21 RX ADMIN — Medication 50 MILLIGRAM(S): at 05:59

## 2019-01-21 NOTE — DISCHARGE NOTE ADULT - CARE PLAN
Principal Discharge DX:	Upper respiratory infection  Goal:	Antibiotic therapy  Assessment and plan of treatment:	You were admitted to the hospital with chest pain. Your cardiac enzymes, EKG and telemetry monitoring were unremarkable. Given that your chest pain was worse with inspiration and worse with coughing, it is unlikely cardiac in origin. Our cardiologist advised no further inpatient workup. Your flu testing was negative. Your symptoms are likely from an acute upper respiratory infection. We have given you a prescription for a 5 day Z-fidelina of antibiotics.  Secondary Diagnosis:	ESRD (end stage renal disease) on dialysis  Assessment and plan of treatment:	Continue outpatient hemodialysis.  Secondary Diagnosis:	HTN (hypertension)  Assessment and plan of treatment:	Continue taking your antihypertensives as prescribed.

## 2019-01-21 NOTE — PROGRESS NOTE ADULT - SUBJECTIVE AND OBJECTIVE BOX
pt seen and examined.pts current chart reviewed and case discussed with resident covering.    SUBJECTIVE:  pt feels fine and denies cp,sob,gi or gu/uremic  symptons    REVIEW OF SYSTEMS:  CONSTITUTIONAL: No weakness, fevers or chills  EYES/ENT: No visual changes;  No vertigo or throat pain   NECK: No pain or stiffness  RESPIRATORY: No cough, wheezing, hemoptysis; No shortness of breath  CARDIOVASCULAR: No chest pain or palpitations  GASTROINTESTINAL: No abdominal or epigastric pain. No nausea, vomiting, or hematemesis; No diarrhea or constipation. No melena or hematochezia.  GENITOURINARY: No dysuria, frequency , hematuria, flank pain or nocturia  NEUROLOGICAL: No numbness or weakness  SKIN: No itching, burning, rashes, or lesions   All other review of systems is negative unless indicated above    Current meds:    acetaminophen   Tablet .. 650 milliGRAM(s) Oral every 6 hours PRN  amLODIPine   Tablet 10 milliGRAM(s) Oral daily  aspirin  chewable 81 milliGRAM(s) Oral daily  atorvastatin 40 milliGRAM(s) Oral at bedtime  calcium acetate 667 milliGRAM(s) Oral three times a day with meals  clopidogrel Tablet 75 milliGRAM(s) Oral daily  famotidine    Tablet 20 milliGRAM(s) Oral two times a day  heparin  Injectable 5000 Unit(s) SubCutaneous every 12 hours  hydrALAZINE 50 milliGRAM(s) Oral <User Schedule>  labetalol 200 milliGRAM(s) Oral two times a day  nitroglycerin     SubLingual 0.4 milliGRAM(s) SubLingual every 5 minutes PRN  sevelamer carbonate Oral Powder - Peds 800 milliGRAM(s) Oral three times a day with meals      Vital Signs    T(F): 98.6 (19 @ 11:11), Max: 99.4 (19 @ 20:33)  HR: 82 (19 @ 11:11) (82 - 93)  BP: 113/53 (19 @ 11:11) (112/66 - 148/72)  ABP: --  RR: 18 (19 @ 11:11) (16 - 20)  SpO2: 98% (19 @ 11:11) (92% - 98%)  Wt(kg): --  CVP(cm H2O): --  CO: --  PCWP: --    I and O's:     @ 07: @ 07:00  --------------------------------------------------------  IN:  Total IN: 0 mL    OUT:    Voided: 400 mL  Total OUT: 400 mL    Total NET: -400 mL       @ 07: @ 13:02  --------------------------------------------------------  IN:    Oral Fluid: 450 mL  Total IN: 450 mL    OUT:  Total OUT: 0 mL    Total NET: 450 mL        Daily Height in cm: 177.8 (2019 15:02)    Daily Weight in k.1 (2019 04:30)    PHYSICAL EXAM:  Constitutional: well developed, well nourished  and in nad  HEENT: PERRLA,  no icteric sclera and mild pallor of conjunctiva noted  Neck: No JVD, thyromegaly or adenopathy  Respiratory: reduced air entry lower lungs with no rales, wheezing or rhonchi  Cardiovascular: S1 and S2 normally heard  Gastrointestinal: soft, nondistended, nontender and normal bowel sounds heard  Extremities: No peripheral edema or cyanosis  Neurological: A/O x 3, no focal deficits  : No flank or cva tenderness palpated.  Skin: No rashes      LABS:    CBC:                          11.3   9.7   )-----------( 175      ( 2019 06:53 )             36.9           BMP:        138  |  99  |  64<H>  ----------------------------<  100<H>  4.9   |  26  |  11.10<H>    Ca    8.2<L>      2019 06:53  Phos  4.5       Mg     2.5         TPro  7.7  /  Alb  3.4<L>  /  TBili  0.6  /  DBili  x   /  AST  11  /  ALT  18  /  AlkPhos  80  01-20          URINE STUDIES:                        RADIOLOGY & ADDITIONAL STUDIES: pt seen and examined.pts current chart reviewed and case discussed with resident covering.    SUBJECTIVE:  pt feels fine and denies cp,sob,gi or uremic  symptons as per pts family at bedside  pt is awaiting for hd today    REVIEW OF SYSTEMS:  CONSTITUTIONAL: No weakness, fevers or chills  EYES/ENT: No visual changes;  No vertigo or throat pain   NECK: No pain or stiffness  RESPIRATORY: No cough, wheezing, hemoptysis; No shortness of breath  CARDIOVASCULAR: No chest pain or palpitations  GASTROINTESTINAL: No abdominal or epigastric pain. No nausea, vomiting, or hematemesis; No diarrhea or constipation. No melena or hematochezia.  GENITOURINARY: No dysuria, frequency , hematuria, flank pain or nocturia  NEUROLOGICAL: No numbness or weakness  SKIN: No itching, burning, rashes, or lesions   All other review of systems is negative unless indicated above    Current meds:    acetaminophen   Tablet .. 650 milliGRAM(s) Oral every 6 hours PRN  amLODIPine   Tablet 10 milliGRAM(s) Oral daily  aspirin  chewable 81 milliGRAM(s) Oral daily  atorvastatin 40 milliGRAM(s) Oral at bedtime  calcium acetate 667 milliGRAM(s) Oral three times a day with meals  clopidogrel Tablet 75 milliGRAM(s) Oral daily  famotidine    Tablet 20 milliGRAM(s) Oral two times a day  heparin  Injectable 5000 Unit(s) SubCutaneous every 12 hours  hydrALAZINE 50 milliGRAM(s) Oral <User Schedule>  labetalol 200 milliGRAM(s) Oral two times a day  nitroglycerin     SubLingual 0.4 milliGRAM(s) SubLingual every 5 minutes PRN  sevelamer carbonate Oral Powder - Peds 800 milliGRAM(s) Oral three times a day with meals      Vital Signs    T(F): 98.6 (19 @ 11:11), Max: 99.4 (19 @ 20:33)  HR: 82 (19 @ 11:11) (82 - 93)  BP: 113/53 (19 @ 11:11) (112/66 - 148/72)  ABP: --  RR: 18 (19 @ 11:11) (16 - 20)  SpO2: 98% (19 @ 11:11) (92% - 98%)  Wt(kg): --  CVP(cm H2O): --  CO: --  PCWP: --    I and O's:     @ 07: @ 07:00  --------------------------------------------------------  IN:  Total IN: 0 mL    OUT:    Voided: 400 mL  Total OUT: 400 mL    Total NET: -400 mL       @ 07: @ 13:02  --------------------------------------------------------  IN:    Oral Fluid: 450 mL  Total IN: 450 mL    OUT:  Total OUT: 0 mL    Total NET: 450 mL        Daily Height in cm: 177.8 (2019 15:02)    Daily Weight in k.1 (2019 04:30)    PHYSICAL EXAM:  Constitutional: well developed, well nourished  and in nad  HEENT: PERRLA,  no icteric sclera and mild pallor of conjunctiva noted  Neck: No JVD, thyromegaly or adenopathy  Respiratory: reduced air entry lower lungs with no rales, wheezing or rhonchi  Cardiovascular: S1 and S2 normally heard  Gastrointestinal: soft, nondistended, nontender and normal bowel sounds heard  Extremities: No peripheral edema or cyanosis  pt has palpable thrill over left avf   Neurological: A/O x 3, no focal deficits  : No flank or cva tenderness palpated.  Skin: No rashes      LABS:    CBC:                          11.3   9.7   )-----------( 175      ( 2019 06:53 )             36.9     BMP:        138  |  99  |  64<H>  ----------------------------<  100<H>  4.9   |  26  |  11.10<H>    Ca    8.2<L>      2019 06:53  Phos  4.5       Mg     2.5         TPro  7.7  /  Alb  3.4<L>  /  TBili  0.6  /  DBili  x   /  AST  11  /  ALT  18  /  AlkPhos  80                        RADIOLOGY & ADDITIONAL STUDIES:  < from: CT Chest No Cont (19 @ 12:32) >  EXAM:  CT CHEST                            PROCEDURE DATE:  2019          INTERPRETATION:  CLINICAL INFORMATION: Chest pain    COMPARISON: None.    PROCEDURE:   CT of the Chest was performed without intravenous contrast.  Sagittal and coronalreformats were performed.      FINDINGS:    CHEST:     CHEST WALL AND LOWER NECK: Within normal limits  MEDIASTINUM AND TWILA: Within normal limits  HEART: Cardiomegaly. Extensive coronary calcifications.  VESSELS: Within normal limits  LUNG AND LARGEAIRWAYS: Bibasilar segmental atelectasis. Otherwise   unremarkable.  VISUALIZED UPPER ABDOMEN: Within normal limits.  BONES: Within normal limits.    IMPRESSION: Bibasilar dependent atelectasis.    < end of copied text >

## 2019-01-21 NOTE — DISCHARGE NOTE ADULT - PLAN OF CARE
Antibiotic therapy You were admitted to the hospital with chest pain. Your cardiac enzymes, EKG and telemetry monitoring were unremarkable. Given that your chest pain was worse with inspiration and worse with coughing, it is unlikely cardiac in origin. Our cardiologist advised no further inpatient workup. Your flu testing was negative. Your symptoms are likely from an acute upper respiratory infection. We have given you a prescription for a 5 day Z-fidelina of antibiotics. Continue outpatient hemodialysis. Continue taking your antihypertensives as prescribed.

## 2019-01-21 NOTE — CONSULT NOTE ADULT - ASSESSMENT
63 yr old Male from home with PMH of IgA nephropathy, ESRD on HD (MWF via LUE AVF at [facility], nephrologist DR. Esme Adams, last HD 01/18/19 Fri), HTN and CAD (S/P stents x2 one month ago at [facility],  cardiologist DR. Jovani Benson) presents with atypical chest pain .  1.D/C Tele.  2.ESRD-HD as per renal.  3.CAD-cont cardiac medication.  4.HTN-cont bp medication.  5.GI and DVT prophylaxis.  6.Outpatient f/u own cardiologist upon discharge.
63 years old M[place] male from home lives with son, with PMH of ESRD(on HD, M/W/F through LUE fistula), HTN, and CAD s/p 2 stents on 12/26/18 at [facility] p/w a chief complaint of chest pain and SOB onset 5am today. Son helps translation and providing history at bedside. Patient says the chest pain is in the midsternal area and is squeezing in nature, but not radiating to the back. Per son, he recently had URI for 1 week with productive cough and subjective fever which has improved so far. ROS otherwise negative.    In ED, pt saturated 98% on room air but due to symptoms of SOB, patient was placed on BIPAP. CXR no significant findings of pleural effusion or consolidation, EKG with no ischemic changes, labs Cr 11 otherwise unremarkable, with normal cardiac enzyme. VS all stable.    Patient was consulted to ICU for possible need for continuation of BIPAP.

## 2019-01-21 NOTE — CONSULT NOTE ADULT - SUBJECTIVE AND OBJECTIVE BOX
CHIEF COMPLAINT: Patient is a 63y old  Male who presents with a chief complaint of chest pain and sob (21 Jan 2019 09:50)      HPI:  64 yo M from home with PMH of IgA nephropathy, ESRD on HD (MWF via LUE AVF at [facility], nephrologist DR. Esme Adams, last HD 01/18/19 Fri), HTN and CAD (S/P stents x2 one month ago at [facility],  cardiologist DR. Jovani Benson) presented to ED c/o left chest pain sudden onset this am around 4 am. Pt describe it is pressure like pain, 7/10, intermitted, radiating to left shoulder, associated with dyspnea, denies palpitation, nausea, vomiting. Pt take asa and plavix at home for recent stents. Patient also c/o recent cold/flu in the past wk, cough improved. Pt denies headache, fever/chills, abd pain, n/v/c/d, dysuria or any other complaints.     ED course, pt vitals stable, pt was placed on bipap for couple hrs for respiratory distress, ABG 7.39/43/95/26 on BIpap Fio2 40%, off bipap planced on NC 2L , SO2 well. Labs noted Troponin x1 negative and EKG noted NSR at HR 87 and no st-t changes, QTc 478. CXR noted clear lungs, no change compared to prior CXR in 2015. s/p asa 162mg x1 in ED, currently chest pain improved.     GOC: discussed GOC with pt HCP lindsay Duncan at bedside, pt is full code. (20 Jan 2019 09:14)   Patient seen and examined.     PAST MEDICAL & SURGICAL HISTORY:  MTHFR mutation  ESRD (end stage renal disease) on dialysis: Started Hemodialysis 8/2017 on Mon./ Wed/ Fri.  IgA nephropathy  High cholesterol  HTN (hypertension)  No significant past surgical history      Allergies    No Known Allergies    Intolerances    ACE inhibitors (Other (Moderate))      MEDICATIONS  (STANDING):  amLODIPine   Tablet 10 milliGRAM(s) Oral daily  aspirin  chewable 81 milliGRAM(s) Oral daily  atorvastatin 40 milliGRAM(s) Oral at bedtime  calcium acetate 667 milliGRAM(s) Oral three times a day with meals  clopidogrel Tablet 75 milliGRAM(s) Oral daily  famotidine    Tablet 20 milliGRAM(s) Oral two times a day  heparin  Injectable 5000 Unit(s) SubCutaneous every 12 hours  hydrALAZINE 50 milliGRAM(s) Oral <User Schedule>  labetalol 200 milliGRAM(s) Oral two times a day  sevelamer carbonate Oral Powder - Peds 800 milliGRAM(s) Oral three times a day with meals      MEDICATIONS  (PRN):  acetaminophen   Tablet .. 650 milliGRAM(s) Oral every 6 hours PRN Temp greater or equal to 38C (100.4F), Mild Pain (1 - 3)  nitroglycerin     SubLingual 0.4 milliGRAM(s) SubLingual every 5 minutes PRN Chest Pain   Medications up to date at time of exam.    FAMILY HISTORY:  No pertinent family history in first degree relatives      SOCIAL HISTORY  Smoking History: [   ] smoking/smoke exposure, [ x  ] former smoker 1/2 PPD x 30 years quit 8 years ago, [  ] denies smoking  Living Condition: [   ] apartment, [   ] private house  Work History: [employer]  Travel History: denies recent travel  Illicit Substance Use: denies  Alcohol Use: denies    REVIEW OF SYSTEMS:    CONSTITUTIONAL:  denies fevers, chills, sweats, weight loss    HEENT:  denies diplopia or blurred vision, sore throat or runny nose.    CARDIOVASCULAR:  denies pressure, squeezing, tightness, or heaviness about the chest; no palpitations.    RESPIRATORY:  denies SOB, cough, TORRES, wheezing.    GASTROINTESTINAL:  denies abdominal pain, nausea, vomiting or diarrhea.    GENITOURINARY: denies dysuria, frequency or urgency.    NEUROLOGIC:  denies numbness, tingling, seizures or weakness.    PSYCHIATRIC:  denies disorder of thought or mood.    MSK: denies swelling, redness      PHYSICAL EXAMINATION:    GENERAL: The patient is a well-developed, well-nourished, in no apparent distress.     Vital Signs Last 24 Hrs  T(C): 36.8 (21 Jan 2019 07:35), Max: 37.4 (20 Jan 2019 20:33)  T(F): 98.2 (21 Jan 2019 07:35), Max: 99.4 (20 Jan 2019 20:33)  HR: 84 (21 Jan 2019 07:35) (83 - 93)  BP: 115/56 (21 Jan 2019 07:35) (112/66 - 148/72)  BP(mean): --  RR: 18 (21 Jan 2019 07:35) (16 - 20)  SpO2: 98% (21 Jan 2019 07:35) (92% - 98%)   (if applicable)    Chest Tube (if applicable)    HEENT: Head is normocephalic and atraumatic. .    NECK: Supple, no palpable adenopathy.    LUNGS: Clear to auscultation, no wheezing, rales, or rhonchi.    HEART: Regular rate and rhythm without murmur.    ABDOMEN: Soft, nontender, and nondistended.  No hepatosplenomegaly is noted.    EXTREMITIES: Without any cyanosis, clubbing, rash, lesions or edema.    NEUROLOGIC: Awake, alert.    SKIN: Warm, dry, good turgor.      LABS:                        11.3   9.7   )-----------( 175      ( 20 Jan 2019 06:53 )             36.9     01-20    138  |  99  |  64<H>  ----------------------------<  100<H>  4.9   |  26  |  11.10<H>    Ca    8.2<L>      20 Jan 2019 06:53  Phos  4.5     01-20  Mg     2.5     01-20    TPro  7.7  /  Alb  3.4<L>  /  TBili  0.6  /  DBili  x   /  AST  11  /  ALT  18  /  AlkPhos  80  01-20    PT/INR - ( 20 Jan 2019 06:53 )   PT: 10.7 sec;   INR: 0.97 ratio         PTT - ( 20 Jan 2019 06:53 )  PTT:29.5 sec    ABG - ( 20 Jan 2019 07:32 )  pH, Arterial: 7.39  pH, Blood: x     /  pCO2: 43    /  pO2: 95    / HCO3: 26    / Base Excess: 1.2   /  SaO2: 96                CARDIAC MARKERS ( 20 Jan 2019 19:25 )  <0.015 ng/mL / x     / 54 U/L / x     / <1.0 ng/mL  CARDIAC MARKERS ( 20 Jan 2019 13:17 )  <0.015 ng/mL / x     / 71 U/L / x     / <1.0 ng/mL  CARDIAC MARKERS ( 20 Jan 2019 06:53 )  <0.015 ng/mL / x     / 86 U/L / x     / <1.0 ng/mL        Serum Pro-Brain Natriuretic Peptide: 3769 pg/mL (01-20-19 @ 06:53)          MICROBIOLOGY: (if applicable)    RADIOLOGY & ADDITIONAL STUDIES:  EKG:   CXR:  ECHO:    IMPRESSION: 63y Male PAST MEDICAL & SURGICAL HISTORY:  MTHFR mutation  ESRD (end stage renal disease) on dialysis: Started Hemodialysis 8/2017 on Mon./ Wed/ Fri.  IgA nephropathy  High cholesterol  HTN (hypertension)  No significant past surgical history     64 yo M from home with PMH of IgA nephropathy, ESRD on HD (MWF via LUE AVF at [facility], nephrologist DR. Esme Adams, last HD 01/18/19 Fri), HTN and CAD (S/P stents x2 one month ago at [facility],  cardiologist DR. Jovani Benson) presented to ED c/o left chest pain sudden onset this am around 4 am. Pt describe it is pressure like pain, 7/10, intermitted, radiating to left shoulder, associated with dyspnea, denies palpitation, nausea, vomiting. Pt take asa and plavix at home for recent stents. Patient also c/o recent cold/flu in the past wk, cough improved. Pt denies headache, fever/chills, abd pain, n/v/c/d, dysuria or any other complaints.     ED course, pt vitals stable, pt was placed on bipap for couple hrs for respiratory distress, ABG 7.39/43/95/26 on BIpap Fio2 40%, off bipap planced on NC 2L , SO2 well. Labs noted Troponin x1 negative and EKG noted NSR at HR 87 and no st-t changes, QTc 478. CXR noted clear lungs, no change compared to prior CXR in 2015. s/p asa 162mg x1 in ED, currently chest pain improved.     --    64 y/o male with pmhx as above, CAD s/p stents 2 months ago, ESRD, HTN p/w CP and SOB. ICU was consulted for possible new bipap, however patient was able to be weaned off bipap. Patient is former smoker, 1/2 PPD x 30 years, quit 8 years ago. Patient reports compliance w/ HD and ESRD diet.       probnp 3700  CXR no acute findings   RVP negative    SUGGESTION:   - con't with bronchodilators, o2 supplement as needed.    - CT Chest   - 2D echo    - HD as per nephrology    - DVT and GI prophylaxis. CHIEF COMPLAINT: Patient is a 63y old  Male who presents with a chief complaint of chest pain and sob (21 Jan 2019 09:50)      HPI:  62 yo M from home with PMH of IgA nephropathy, ESRD on HD (MWF via LUE AVF at [facility], nephrologist DR. Esme Adams, last HD 01/18/19 Fri), HTN and CAD (S/P stents x2 one month ago at [facility],  cardiologist DR. Jovani Benson) presented to ED c/o left chest pain sudden onset this am around 4 am. Pt describe it is pressure like pain, 7/10, intermitted, radiating to left shoulder, associated with dyspnea, denies palpitation, nausea, vomiting. Pt take asa and plavix at home for recent stents. Patient also c/o recent cold/flu in the past wk, cough improved. Pt denies headache, fever/chills, abd pain, n/v/c/d, dysuria or any other complaints.     ED course, pt vitals stable, pt was placed on bipap for couple hrs for respiratory distress, ABG 7.39/43/95/26 on BIpap Fio2 40%, off bipap planced on NC 2L , SO2 well. Labs noted Troponin x1 negative and EKG noted NSR at HR 87 and no st-t changes, QTc 478. CXR noted clear lungs, no change compared to prior CXR in 2015. s/p asa 162mg x1 in ED, currently chest pain improved.     GOC: discussed GOC with pt HCP lindsay Duncan at bedside, pt is full code. (20 Jan 2019 09:14)   Patient seen and examined.     PAST MEDICAL & SURGICAL HISTORY:  MTHFR mutation  ESRD (end stage renal disease) on dialysis: Started Hemodialysis 8/2017 on Mon./ Wed/ Fri.  IgA nephropathy  High cholesterol  HTN (hypertension)  No significant past surgical history      Allergies    No Known Allergies    Intolerances    ACE inhibitors (Other (Moderate))      MEDICATIONS  (STANDING):  amLODIPine   Tablet 10 milliGRAM(s) Oral daily  aspirin  chewable 81 milliGRAM(s) Oral daily  atorvastatin 40 milliGRAM(s) Oral at bedtime  calcium acetate 667 milliGRAM(s) Oral three times a day with meals  clopidogrel Tablet 75 milliGRAM(s) Oral daily  famotidine    Tablet 20 milliGRAM(s) Oral two times a day  heparin  Injectable 5000 Unit(s) SubCutaneous every 12 hours  hydrALAZINE 50 milliGRAM(s) Oral <User Schedule>  labetalol 200 milliGRAM(s) Oral two times a day  sevelamer carbonate Oral Powder - Peds 800 milliGRAM(s) Oral three times a day with meals      MEDICATIONS  (PRN):  acetaminophen   Tablet .. 650 milliGRAM(s) Oral every 6 hours PRN Temp greater or equal to 38C (100.4F), Mild Pain (1 - 3)  nitroglycerin     SubLingual 0.4 milliGRAM(s) SubLingual every 5 minutes PRN Chest Pain   Medications up to date at time of exam.    FAMILY HISTORY:  No pertinent family history in first degree relatives      SOCIAL HISTORY  Smoking History: [   ] smoking/smoke exposure, [ x  ] former smoker 1/2 PPD x 30 years quit 8 years ago, [  ] denies smoking  Living Condition: [   ] apartment, [   ] private house  Work History: [employer]  Travel History: denies recent travel  Illicit Substance Use: denies  Alcohol Use: denies    REVIEW OF SYSTEMS:    CONSTITUTIONAL:  denies fevers, chills, sweats, weight loss    HEENT:  denies diplopia or blurred vision, sore throat or runny nose.    CARDIOVASCULAR:  denies pressure, squeezing, tightness, or heaviness about the chest; no palpitations.    RESPIRATORY:  denies SOB, cough, TORRES, wheezing.    GASTROINTESTINAL:  denies abdominal pain, nausea, vomiting or diarrhea.    GENITOURINARY: denies dysuria, frequency or urgency.    NEUROLOGIC:  denies numbness, tingling, seizures or weakness.    PSYCHIATRIC:  denies disorder of thought or mood.    MSK: denies swelling, redness      PHYSICAL EXAMINATION:    GENERAL: The patient is a well-developed, well-nourished, in no apparent distress.     Vital Signs Last 24 Hrs  T(C): 36.8 (21 Jan 2019 07:35), Max: 37.4 (20 Jan 2019 20:33)  T(F): 98.2 (21 Jan 2019 07:35), Max: 99.4 (20 Jan 2019 20:33)  HR: 84 (21 Jan 2019 07:35) (83 - 93)  BP: 115/56 (21 Jan 2019 07:35) (112/66 - 148/72)  BP(mean): --  RR: 18 (21 Jan 2019 07:35) (16 - 20)  SpO2: 98% (21 Jan 2019 07:35) (92% - 98%)   (if applicable)    Chest Tube (if applicable)    HEENT: Head is normocephalic and atraumatic. .    NECK: Supple, no palpable adenopathy.    LUNGS: Clear to auscultation, no wheezing, rales, or rhonchi.    HEART: Regular rate and rhythm without murmur.    ABDOMEN: Soft, nontender, and nondistended.  No hepatosplenomegaly is noted.    EXTREMITIES: Without any cyanosis, clubbing, rash, lesions or edema.    NEUROLOGIC: Awake, alert.    SKIN: Warm, dry, good turgor.      LABS:                        11.3   9.7   )-----------( 175      ( 20 Jan 2019 06:53 )             36.9     01-20    138  |  99  |  64<H>  ----------------------------<  100<H>  4.9   |  26  |  11.10<H>    Ca    8.2<L>      20 Jan 2019 06:53  Phos  4.5     01-20  Mg     2.5     01-20    TPro  7.7  /  Alb  3.4<L>  /  TBili  0.6  /  DBili  x   /  AST  11  /  ALT  18  /  AlkPhos  80  01-20    PT/INR - ( 20 Jan 2019 06:53 )   PT: 10.7 sec;   INR: 0.97 ratio         PTT - ( 20 Jan 2019 06:53 )  PTT:29.5 sec    ABG - ( 20 Jan 2019 07:32 )  pH, Arterial: 7.39  pH, Blood: x     /  pCO2: 43    /  pO2: 95    / HCO3: 26    / Base Excess: 1.2   /  SaO2: 96                CARDIAC MARKERS ( 20 Jan 2019 19:25 )  <0.015 ng/mL / x     / 54 U/L / x     / <1.0 ng/mL  CARDIAC MARKERS ( 20 Jan 2019 13:17 )  <0.015 ng/mL / x     / 71 U/L / x     / <1.0 ng/mL  CARDIAC MARKERS ( 20 Jan 2019 06:53 )  <0.015 ng/mL / x     / 86 U/L / x     / <1.0 ng/mL        Serum Pro-Brain Natriuretic Peptide: 3769 pg/mL (01-20-19 @ 06:53)          MICROBIOLOGY: (if applicable)    RADIOLOGY & ADDITIONAL STUDIES:  EKG:   CXR:  ECHO:    IMPRESSION: 63y Male PAST MEDICAL & SURGICAL HISTORY:  MTHFR mutation  ESRD (end stage renal disease) on dialysis: Started Hemodialysis 8/2017 on Mon./ Wed/ Fri.  IgA nephropathy  High cholesterol  HTN (hypertension)  No significant past surgical history     62 yo M from home with PMH of IgA nephropathy, ESRD on HD (MWF via LUE AVF at [facility], nephrologist DR. Esme Adams, last HD 01/18/19 Fri), HTN and CAD (S/P stents x2 one month ago at [facility],  cardiologist DR. Jovani Benson) presented to ED c/o left chest pain sudden onset this am around 4 am. Pt describe it is pressure like pain, 7/10, intermitted, radiating to left shoulder, associated with dyspnea, denies palpitation, nausea, vomiting. Pt take asa and plavix at home for recent stents. Patient also c/o recent cold/flu in the past wk, cough improved. Pt denies headache, fever/chills, abd pain, n/v/c/d, dysuria or any other complaints.     ED course, pt vitals stable, pt was placed on bipap for couple hrs for respiratory distress, ABG 7.39/43/95/26 on BIpap Fio2 40%, off bipap planced on NC 2L , SO2 well. Labs noted Troponin x1 negative and EKG noted NSR at HR 87 and no st-t changes, QTc 478. CXR noted clear lungs, no change compared to prior CXR in 2015. s/p asa 162mg x1 in ED, currently chest pain improved.     --    64 y/o male with pmhx as above, CAD s/p stents 2 months ago, ESRD, HTN p/w CP and SOB. ICU was consulted for possible new bipap, however patient was able to be weaned off bipap. Patient is former smoker, 1/2 PPD x 30 years, quit 8 years ago. Patient reports compliance w/ HD and ESRD diet.       probnp 3700  CXR no acute findings   RVP negative    SUGGESTION:   - con't with bronchodilators, o2 supplement as needed.    - CT Chest   - 2D echo    - HD as per nephrology    - DVT and GI prophylaxis.     Agree with above assessment and plan as transcribed.

## 2019-01-21 NOTE — DISCHARGE NOTE ADULT - ADDITIONAL INSTRUCTIONS
Z-pack antibiotics for 5 days.  Follow up with cardiologist within 1 week. Z-pack antibiotics for 4 more days.  Follow up with cardiologist within 1 week.

## 2019-01-21 NOTE — PROGRESS NOTE ADULT - ASSESSMENT
ASSESSMENT AND PLAN:  1. ESRD on hd M/W/F schedule  -pt will be dialysed today.Hd orders written and discussed with dialysis RN  -Keep patient euvolemic and HD renal diet  -Avoid Nephrotoxic Meds/ Agents such as (NSAIDs, IV contrast, Aminoglycosides such as gentamicin, -Gadolinium contrast, Phosphate containing enemas, etc..)  -Adjust Medications according to eGFR  -f/u bmp daily  2. Anemia due to CKD. No need for Epogen at this time  3. 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

## 2019-01-21 NOTE — DISCHARGE NOTE ADULT - PATIENT PORTAL LINK FT
You can access the [facility] Patient Portal, offered by [facility], by registering with the following website: [email or web address]

## 2019-01-21 NOTE — DISCHARGE NOTE ADULT - MEDICATION SUMMARY - MEDICATIONS TO TAKE
I will START or STAY ON the medications listed below when I get home from the hospital:    Asprin  -- 81 milligram(s) by mouth once a day. Remain on  -- Indication: For Prophylactic measure    Plavix 300 mg oral tablet  -- 1 tab(s) by mouth once a day  -- Indication: For CAD (coronary artery disease)    labetalol 200 mg oral tablet  -- 200 milligram(s) by mouth 2 times a day  -- Indication: For HTN (hypertension)    amLODIPine 10 mg oral tablet  -- 1 tab(s) by mouth once a day  -- Indication: For HTN (hypertension)    Robitussin Mucus + Chest Congestion 100 mg/5 mL oral liquid  -- 5 milliliter(s) by mouth every 8 hours, As Needed   -- Medication should be taken with plenty of water.    -- Indication: For Upper respiratory infection    famotidine 20 mg oral tablet  -- 1 tab HS before and 1 tab am of surgery  -- Indication: For Gastritis    Azithromycin 5 Day Dose Pack 250 mg oral tablet  -- 1 tab(s) by mouth once a day   -- Do not take dairy products, antacids, or iron preparations within one hour of this medication.  Finish all this medication unless otherwise directed by prescriber.    -- Indication: For Upper respiratory infection    PhosLo 667 mg oral tablet  -- 1 tab(s) by mouth 3 times a day  -- Indication: For ESRD (end stage renal disease) on dialysis    sevelamer hydrochloride 800 mg oral tablet  -- 1 tab(s) by mouth 3 times a day  -- Indication: For ESRD (end stage renal disease) on dialysis    Coricidin  -- Indication: For URI    hydrALAZINE 50 mg oral tablet  -- 1 tab(s) by mouth once a day  -- Indication: For HTN (hypertension)

## 2019-01-21 NOTE — DISCHARGE NOTE ADULT - HOSPITAL COURSE
63 yr old Male from home with PMH of IgA nephropathy, ESRD on HD (MWF via LUE AVF at [facility], nephrologist DR. Esme Adams, last HD 01/18/19 Fri), HTN and CAD (S/P stents x2 one month ago at [facility],  cardiologist DR. Jovani Benson) presented to ED c/o left chest pain sudden onset this am around 4 am. Pt describe it is pressure like pain, 7/10, when he coughs, intermitted, radiating to left shoulder, associated with dyspnea, denies palpitation, nausea, vomiting. Pt take asa and plavix at home for recent stents. Patient also c/o recent cold/flu in the past wk, cough improved. Pt denies headache, fever/chills, abd pain, n/v/c/d, dysuria or any other complaints. ED course, pt vitals stable, pt was placed on bipap for couple hrs for respiratory distress, ABG 7.39/43/95/26 on BIpap Fio2 40%, off bipap planced on NC 2L , SO2 well. Labs noted Troponin x1 negative and EKG noted NSR at HR 87 and no st-t changes, QTc 478. CXR noted clear lungs, no change compared to prior CXR in 2015. s/p asa 162mg x1 in ED, currently chest pain improved.     Chest pain pleuritic in nature and worse with cough. Trops negative x3. EKG NSR. Cardio advises no further workup. Symptoms likely 2/2 to URI. Patient's family wants him to leave to make his outpatient HD session. Medically stable for discharge. Discussed with Dr. Arrieta. 63 yr old Male from home with PMH of IgA nephropathy, ESRD on HD (MWF via LUE AVF at [facility], nephrologist DR. Esme Adams, last HD 01/18/19 Fri), HTN and CAD (S/P stents x2 one month ago at [facility],  cardiologist DR. Jovani Benson) presented to ED c/o left chest pain sudden onset this am around 4 am. Pt describe it is pressure like pain, 7/10, when he coughs, intermitted, radiating to left shoulder, associated with dyspnea, denies palpitation, nausea, vomiting. Pt take asa and plavix at home for recent stents. Patient also c/o recent cold/flu in the past wk, cough improved. Pt denies headache, fever/chills, abd pain, n/v/c/d, dysuria or any other complaints. ED course, pt vitals stable, pt was placed on bipap for couple hrs for respiratory distress, ABG 7.39/43/95/26 on BIpap Fio2 40%, off bipap planced on NC 2L , SO2 well. Labs noted Troponin x1 negative and EKG noted NSR at HR 87 and no st-t changes, QTc 478. CXR noted clear lungs, no change compared to prior CXR in 2015. s/p asa 162mg x1 in ED, currently chest pain improved.     Chest pain pleuritic in nature and worse with cough. Trops negative x3. EKG NSR. Cardio advises no further workup. Symptoms likely 2/2 to URI. Patient's family wants him to leave to make his outpatient HD session, but then changed their mind and asked for him to be dialyzed first prior to d/c. Medically stable for discharge. Discussed with Dr. Arrieta.

## 2019-01-21 NOTE — CONSULT NOTE ADULT - SUBJECTIVE AND OBJECTIVE BOX
CHIEF COMPLAINT:Patient is a 63y old  Male who presents with a chief complaint of chest pain and sob.      HPI:  63 yr old Male from home with PMH of IgA nephropathy, ESRD on HD (MWF via LUE AVF at [facility], nephrologist DR. Esme Adams, last HD 01/18/19 Fri), HTN and CAD (S/P stents x2 one month ago at [facility],  cardiologist DR. Jovani Benson) presented to ED c/o left chest pain sudden onset this am around 4 am. Pt describe it is pressure like pain, 7/10, when he coughs, intermitted, radiating to left shoulder, associated with dyspnea, denies palpitation, nausea, vomiting. Pt take asa and plavix at home for recent stents. Patient also c/o recent cold/flu in the past wk, cough improved. Pt denies headache, fever/chills, abd pain, n/v/c/d, dysuria or any other complaints. ED course, pt vitals stable, pt was placed on bipap for couple hrs for respiratory distress, ABG 7.39/43/95/26 on BIpap Fio2 40%, off bipap planced on NC 2L , SO2 well. Labs noted Troponin x1 negative and EKG noted NSR at HR 87 and no st-t changes, QTc 478. CXR noted clear lungs, no change compared to prior CXR in 2015. s/p asa 162mg x1 in ED, currently chest pain improved.       PAST MEDICAL & SURGICAL HISTORY:  MTHFR mutation  ESRD (end stage renal disease) on dialysis: Started Hemodialysis 8/2017 on Mon./ Wed/ Fri.  IgA nephropathy  High cholesterol  HTN (hypertension)  CAD    MEDICATIONS  (STANDING):  amLODIPine   Tablet 10 milliGRAM(s) Oral daily  aspirin  chewable 81 milliGRAM(s) Oral daily  atorvastatin 40 milliGRAM(s) Oral at bedtime  calcium acetate 667 milliGRAM(s) Oral three times a day with meals  clopidogrel Tablet 75 milliGRAM(s) Oral daily  famotidine    Tablet 20 milliGRAM(s) Oral two times a day  heparin  Injectable 5000 Unit(s) SubCutaneous every 12 hours  hydrALAZINE 50 milliGRAM(s) Oral <User Schedule>  labetalol 200 milliGRAM(s) Oral two times a day  sevelamer carbonate Oral Powder - Peds 800 milliGRAM(s) Oral three times a day with meals    MEDICATIONS  (PRN):  acetaminophen   Tablet .. 650 milliGRAM(s) Oral every 6 hours PRN Temp greater or equal to 38C (100.4F), Mild Pain (1 - 3)  nitroglycerin     SubLingual 0.4 milliGRAM(s) SubLingual every 5 minutes PRN Chest Pain      FAMILY HISTORY:No hx of CAD        SOCIAL HISTORY:    [x ] Non-smoker    [x ] Alcohol-denies    Allergies    No Known Allergies    Intolerances    ACE inhibitors (Other (Moderate))  	    REVIEW OF SYSTEMS:  CONSTITUTIONAL: No fever, weight loss, or fatigue  EYES: No eye pain, visual disturbances, or discharge  ENT:  No difficulty hearing, tinnitus, vertigo; No sinus or throat pain  NECK: No pain or stiffness  RESPIRATORY: + cough, wheezing, chills or hemoptysis; + Shortness of Breath  CARDIOVASCULAR: + chest pain, No palpitations, passing out, dizziness, or leg swelling  GASTROINTESTINAL: No abdominal or epigastric pain. No nausea, vomiting, or hematemesis; No diarrhea or constipation. No melena or hematochezia.  GENITOURINARY: No dysuria, frequency, hematuria, or incontinence  NEUROLOGICAL: No headaches, memory loss, loss of strength, numbness, or tremors  SKIN: No itching, burning, rashes, or lesions   LYMPH Nodes: No enlarged glands  ENDOCRINE: No heat or cold intolerance; No hair loss  MUSCULOSKELETAL: No joint pain or swelling; No muscle, back, or extremity pain  PSYCHIATRIC: No depression, anxiety, mood swings, or difficulty sleeping  HEME/LYMPH: No easy bruising, or bleeding gums  ALLERGY AND IMMUNOLOGIC: No hives or eczema	      PHYSICAL EXAM:  T(C): 36.8 (01-21-19 @ 07:35), Max: 37.4 (01-20-19 @ 20:33)  HR: 84 (01-21-19 @ 07:35) (81 - 93)  BP: 115/56 (01-21-19 @ 07:35) (112/66 - 148/72)  RR: 18 (01-21-19 @ 07:35) (16 - 20)  SpO2: 98% (01-21-19 @ 07:35) (92% - 99%)    I&O's Summary    20 Jan 2019 07:01  -  21 Jan 2019 07:00  --------------------------------------------------------  IN: 0 mL / OUT: 400 mL / NET: -400 mL        Appearance: Normal	  HEENT:   Normal oral mucosa, PERRL, EOMI	  Lymphatic: No lymphadenopathy  Cardiovascular: Normal S1 S2, No JVD, No murmurs, No edema  Respiratory: Lungs clear to auscultation	  Psychiatry: A & O x 3, Mood & affect appropriate  Gastrointestinal:  Soft, Non-tender, + BS	  Skin: No rashes, No ecchymoses, No cyanosis	  Neurologic: Non-focal  Extremities: Normal range of motion, No clubbing, cyanosis or edema  Vascular: Peripheral pulses palpable 2+ bilaterally    	    ECG:  	nsr,irbbb,lafb    	  LABS:	 	    CARDIAC MARKERS:  CARDIAC MARKERS ( 20 Jan 2019 19:25 )  <0.015 ng/mL / x     / 54 U/L / x     / <1.0 ng/mL  CARDIAC MARKERS ( 20 Jan 2019 13:17 )  <0.015 ng/mL / x     / 71 U/L / x     / <1.0 ng/mL  CARDIAC MARKERS ( 20 Jan 2019 06:53 )  <0.015 ng/mL / x     / 86 U/L / x     / <1.0 ng/mL                         11.3   9.7   )-----------( 175      ( 20 Jan 2019 06:53 )             36.9     01-20    138  |  99  |  64<H>  ----------------------------<  100<H>  4.9   |  26  |  11.10<H>    Ca    8.2<L>      20 Jan 2019 06:53  Phos  4.5     01-20  Mg     2.5     01-20    TPro  7.7  /  Alb  3.4<L>  /  TBili  0.6  /  DBili  x   /  AST  11  /  ALT  18  /  AlkPhos  80  01-20      EXAM:  RAD CHEST PA LAT        PROCEDURE DATE:  Apr 4 2015       INTERPRETATION:  CLINICAL INDICATION: Evaluate for pneumonia.    EXAM: PA and lateral views of the chest.    COMPARISON: No priors.    FINDINGS:  The lungs are clear. No evidence of pleural effusion or pneumothorax.  Heart size and mediastinum are unremarkable.    IMPRESSION: Clear Lungs.    FLU A B RSV Detection by PCR (01.20.19 @ 10:29)    Flu A Result: NotDetec: The Flu A B RSV assay is a Real-Time PCR test for the qualitative  detection and differentiation of Influenza A, Influenza B, and  Respiratory Syncytial Virus on nasopharyngeal swabs. The results should  be interpreted in the context of all clinical and laboratory findings.    Flu B Result: NotDetec    RSV Result: NotDetec

## 2019-04-01 ENCOUNTER — APPOINTMENT (OUTPATIENT)
Dept: VASCULAR SURGERY | Facility: CLINIC | Age: 64
End: 2019-04-01
Payer: MEDICARE

## 2019-04-01 VITALS
BODY MASS INDEX: 30.05 KG/M2 | SYSTOLIC BLOOD PRESSURE: 147 MMHG | HEIGHT: 66 IN | DIASTOLIC BLOOD PRESSURE: 85 MMHG | WEIGHT: 187 LBS | HEART RATE: 72 BPM

## 2019-04-01 PROCEDURE — 99212 OFFICE O/P EST SF 10 MIN: CPT

## 2019-04-01 PROCEDURE — 93990 DOPPLER FLOW TESTING: CPT

## 2019-04-01 NOTE — PHYSICAL EXAM
[Thrill] : thrill [Aneurysm] : no aneurysm [Bleeding] : no bleeding [Hand well perfused] : hand well perfused [Ulcer] : no ulcer [2+] : left 2+ [Normal] : coordination grossly intact, no focal deficits [de-identified] : intact

## 2019-04-01 NOTE — HISTORY OF PRESENT ILLNESS
[FreeTextEntry1] : 65 yo male with history of esrd on hd via left upper extremity radiocephalic avf presents for follow up.  pt without any complaints at this time, he denies any difficulty with avf during hd or bleeding after hd  [] : left radiocephalic fistula

## 2019-04-01 NOTE — DISCUSSION/SUMMARY
[FreeTextEntry1] : 65 yo male with history of esrd on hd via left upper extremity radiocephalic avf presents for follow up without any complaints\par duplex shows left radiocephalic avf with 50-75 % stenosis of the juxta-anastomosis and proximal forearm with patent stents with a flow rate of 2327\par given no difficulty with hd will continue to monitor pt to follow up in 3-4 months with repeat duplex

## 2019-04-08 NOTE — PAST MEDICAL HISTORY
[Increasing age ( >40 years old)] : Increasing age ( >40 years old) [No therapy indicated for cases scheduled for less than one hour] : No therapy indicated for cases scheduled for less than one hour. [FreeTextEntry1] : Malignant Hyperthermia Screening Tool and Risk of Bleeding Assessment\par \par Mr. REYNA ABBOTT denies family history of unexpected death following Anesthesia or Exercise.\par Denies Family history of Malignant Hyperthermia, Muscle or Neuromuscular disorder and High Temperature following exercise.\par \par Mr. REYNA ABBOTT denies history of Muscle Spasm, Dark or Chocolate - Colored urine and Unanticipated fever immediately following anesthesia or serious exercise. \par Mr. ABBOTT also denies bleeding tendencies/ Risks of Bleeding.\par

## 2019-04-09 ENCOUNTER — APPOINTMENT (OUTPATIENT)
Dept: ENDOVASCULAR SURGERY | Facility: CLINIC | Age: 64
End: 2019-04-09

## 2019-04-17 ENCOUNTER — APPOINTMENT (OUTPATIENT)
Dept: ENDOVASCULAR SURGERY | Facility: CLINIC | Age: 64
End: 2019-04-17

## 2019-06-04 ENCOUNTER — EMERGENCY (EMERGENCY)
Facility: HOSPITAL | Age: 64
LOS: 1 days | Discharge: HOME | End: 2019-06-04
Attending: EMERGENCY MEDICINE
Payer: MEDICARE

## 2019-06-04 VITALS
RESPIRATION RATE: 17 BRPM | DIASTOLIC BLOOD PRESSURE: 70 MMHG | OXYGEN SATURATION: 97 % | HEART RATE: 75 BPM | SYSTOLIC BLOOD PRESSURE: 134 MMHG

## 2019-06-04 VITALS
DIASTOLIC BLOOD PRESSURE: 61 MMHG | HEART RATE: 83 BPM | OXYGEN SATURATION: 97 % | TEMPERATURE: 98 F | SYSTOLIC BLOOD PRESSURE: 101 MMHG | RESPIRATION RATE: 16 BRPM

## 2019-06-04 LAB
ALBUMIN SERPL ELPH-MCNC: 4.2 G/DL — SIGNIFICANT CHANGE UP (ref 3.3–5)
ALP SERPL-CCNC: 78 U/L — SIGNIFICANT CHANGE UP (ref 40–120)
ALT FLD-CCNC: 20 U/L — SIGNIFICANT CHANGE UP (ref 10–45)
ANION GAP SERPL CALC-SCNC: 17 MMOL/L — SIGNIFICANT CHANGE UP (ref 5–17)
APPEARANCE UR: CLEAR — SIGNIFICANT CHANGE UP
AST SERPL-CCNC: 10 U/L — SIGNIFICANT CHANGE UP (ref 10–40)
BACTERIA # UR AUTO: NEGATIVE — SIGNIFICANT CHANGE UP
BASE EXCESS BLDV CALC-SCNC: 4.9 MMOL/L — HIGH (ref -2–2)
BILIRUB SERPL-MCNC: 0.4 MG/DL — SIGNIFICANT CHANGE UP (ref 0.2–1.2)
BILIRUB UR-MCNC: NEGATIVE — SIGNIFICANT CHANGE UP
BUN SERPL-MCNC: 53 MG/DL — HIGH (ref 7–23)
CA-I SERPL-SCNC: 1.11 MMOL/L — LOW (ref 1.12–1.3)
CALCIUM SERPL-MCNC: 9.2 MG/DL — SIGNIFICANT CHANGE UP (ref 8.4–10.5)
CHLORIDE BLDV-SCNC: 98 MMOL/L — SIGNIFICANT CHANGE UP (ref 96–108)
CHLORIDE SERPL-SCNC: 96 MMOL/L — SIGNIFICANT CHANGE UP (ref 96–108)
CO2 BLDV-SCNC: 31 MMOL/L — HIGH (ref 22–30)
CO2 SERPL-SCNC: 27 MMOL/L — SIGNIFICANT CHANGE UP (ref 22–31)
COLOR SPEC: SIGNIFICANT CHANGE UP
CREAT SERPL-MCNC: 10.35 MG/DL — HIGH (ref 0.5–1.3)
DIFF PNL FLD: ABNORMAL
EPI CELLS # UR: 1 /HPF — SIGNIFICANT CHANGE UP
GAS PNL BLDV: 136 MMOL/L — SIGNIFICANT CHANGE UP (ref 136–145)
GAS PNL BLDV: SIGNIFICANT CHANGE UP
GLUCOSE BLDV-MCNC: 92 MG/DL — SIGNIFICANT CHANGE UP (ref 70–99)
GLUCOSE SERPL-MCNC: 103 MG/DL — HIGH (ref 70–99)
GLUCOSE UR QL: ABNORMAL
HCO3 BLDV-SCNC: 30 MMOL/L — HIGH (ref 21–29)
HCT VFR BLD CALC: 30.4 % — LOW (ref 39–50)
HCT VFR BLDA CALC: 31 % — LOW (ref 39–50)
HGB BLD CALC-MCNC: 9.9 G/DL — LOW (ref 13–17)
HGB BLD-MCNC: 10.1 G/DL — LOW (ref 13–17)
HYALINE CASTS # UR AUTO: 0 /LPF — SIGNIFICANT CHANGE UP (ref 0–2)
KETONES UR-MCNC: NEGATIVE — SIGNIFICANT CHANGE UP
LACTATE BLDV-MCNC: 0.7 MMOL/L — SIGNIFICANT CHANGE UP (ref 0.7–2)
LEUKOCYTE ESTERASE UR-ACNC: NEGATIVE — SIGNIFICANT CHANGE UP
MAGNESIUM SERPL-MCNC: 2.5 MG/DL — SIGNIFICANT CHANGE UP (ref 1.6–2.6)
MCHC RBC-ENTMCNC: 30.8 PG — SIGNIFICANT CHANGE UP (ref 27–34)
MCHC RBC-ENTMCNC: 33.4 GM/DL — SIGNIFICANT CHANGE UP (ref 32–36)
MCV RBC AUTO: 92.3 FL — SIGNIFICANT CHANGE UP (ref 80–100)
NITRITE UR-MCNC: NEGATIVE — SIGNIFICANT CHANGE UP
PCO2 BLDV: 49 MMHG — SIGNIFICANT CHANGE UP (ref 35–50)
PH BLDV: 7.4 — SIGNIFICANT CHANGE UP (ref 7.35–7.45)
PH UR: 8.5 — HIGH (ref 5–8)
PLATELET # BLD AUTO: 212 K/UL — SIGNIFICANT CHANGE UP (ref 150–400)
PO2 BLDV: 30 MMHG — SIGNIFICANT CHANGE UP (ref 25–45)
POTASSIUM BLDV-SCNC: 4.6 MMOL/L — SIGNIFICANT CHANGE UP (ref 3.5–5.3)
POTASSIUM SERPL-MCNC: 5 MMOL/L — SIGNIFICANT CHANGE UP (ref 3.5–5.3)
POTASSIUM SERPL-SCNC: 5 MMOL/L — SIGNIFICANT CHANGE UP (ref 3.5–5.3)
PROT SERPL-MCNC: 7.6 G/DL — SIGNIFICANT CHANGE UP (ref 6–8.3)
PROT UR-MCNC: ABNORMAL
RAPID RVP RESULT: SIGNIFICANT CHANGE UP
RBC # BLD: 3.29 M/UL — LOW (ref 4.2–5.8)
RBC # FLD: 13.8 % — SIGNIFICANT CHANGE UP (ref 10.3–14.5)
RBC CASTS # UR COMP ASSIST: 1 /HPF — SIGNIFICANT CHANGE UP (ref 0–4)
SAO2 % BLDV: 47 % — LOW (ref 67–88)
SODIUM SERPL-SCNC: 140 MMOL/L — SIGNIFICANT CHANGE UP (ref 135–145)
SP GR SPEC: 1.01 — SIGNIFICANT CHANGE UP (ref 1.01–1.02)
TROPONIN T, HIGH SENSITIVITY RESULT: 60 NG/L — HIGH (ref 0–51)
TROPONIN T, HIGH SENSITIVITY RESULT: 60 NG/L — HIGH (ref 0–51)
UROBILINOGEN FLD QL: NEGATIVE — SIGNIFICANT CHANGE UP
WBC # BLD: 7.2 K/UL — SIGNIFICANT CHANGE UP (ref 3.8–10.5)
WBC # FLD AUTO: 7.2 K/UL — SIGNIFICANT CHANGE UP (ref 3.8–10.5)
WBC UR QL: 2 /HPF — SIGNIFICANT CHANGE UP (ref 0–5)

## 2019-06-04 PROCEDURE — 87486 CHLMYD PNEUM DNA AMP PROBE: CPT

## 2019-06-04 PROCEDURE — 83605 ASSAY OF LACTIC ACID: CPT

## 2019-06-04 PROCEDURE — 71046 X-RAY EXAM CHEST 2 VIEWS: CPT | Mod: 26

## 2019-06-04 PROCEDURE — 82435 ASSAY OF BLOOD CHLORIDE: CPT

## 2019-06-04 PROCEDURE — 99283 EMERGENCY DEPT VISIT LOW MDM: CPT | Mod: 25

## 2019-06-04 PROCEDURE — 83735 ASSAY OF MAGNESIUM: CPT

## 2019-06-04 PROCEDURE — 85014 HEMATOCRIT: CPT

## 2019-06-04 PROCEDURE — 93005 ELECTROCARDIOGRAM TRACING: CPT

## 2019-06-04 PROCEDURE — 93010 ELECTROCARDIOGRAM REPORT: CPT

## 2019-06-04 PROCEDURE — 87581 M.PNEUMON DNA AMP PROBE: CPT

## 2019-06-04 PROCEDURE — 85027 COMPLETE CBC AUTOMATED: CPT

## 2019-06-04 PROCEDURE — 84484 ASSAY OF TROPONIN QUANT: CPT

## 2019-06-04 PROCEDURE — 87040 BLOOD CULTURE FOR BACTERIA: CPT

## 2019-06-04 PROCEDURE — 99285 EMERGENCY DEPT VISIT HI MDM: CPT | Mod: 25

## 2019-06-04 PROCEDURE — 87798 DETECT AGENT NOS DNA AMP: CPT

## 2019-06-04 PROCEDURE — 84295 ASSAY OF SERUM SODIUM: CPT

## 2019-06-04 PROCEDURE — 82947 ASSAY GLUCOSE BLOOD QUANT: CPT

## 2019-06-04 PROCEDURE — 87086 URINE CULTURE/COLONY COUNT: CPT

## 2019-06-04 PROCEDURE — 71046 X-RAY EXAM CHEST 2 VIEWS: CPT

## 2019-06-04 PROCEDURE — 81001 URINALYSIS AUTO W/SCOPE: CPT

## 2019-06-04 PROCEDURE — 84132 ASSAY OF SERUM POTASSIUM: CPT

## 2019-06-04 PROCEDURE — 82330 ASSAY OF CALCIUM: CPT

## 2019-06-04 PROCEDURE — 82803 BLOOD GASES ANY COMBINATION: CPT

## 2019-06-04 PROCEDURE — 87633 RESP VIRUS 12-25 TARGETS: CPT

## 2019-06-04 PROCEDURE — 80053 COMPREHEN METABOLIC PANEL: CPT

## 2019-06-04 NOTE — ED ADULT TRIAGE NOTE - ESI TRIAGE ACUITY LEVEL, MLM
89 y/o female with h/o dementia, HTN, TIA/CVA, HLD, GERD, Osteoporosis, Polymyalgia, Glaucoma, COPD, recent diagnosed with breast CA, started on chemo by Dr. Galindo on 3/12/18, then diarrhea and CDAD on po Vanco was admitted on 3/25 for increased confusion. She has loose stools. Denies pain.    3/25: she is alert and verbal; confusion is at baseline. Knows she is on daily Prednisone doesn't  know for what; AAOx3  3/26: RR called for change in MS: pt weak, unable to speak due to voice weakness, articulates words with her lips; unable to cough initially, after a while was able to produce one cough and with great struggle said No: it was a very nasal type of" no" clearly not normal; daughter present; same thing happened Sun; Mom is a v active lady, and here she is been walking with no signs of weakness. Transfer to monitor bed for pulse o2 monitor; doubt GBS or MG; MRI will be ordered  3/27: MRI neg pt at baseline; tele normal    Vital Signs Last 24 Hrs  T(C): 36.8 (28 Mar 2018 11:00), Max: 36.9 (27 Mar 2018 20:15)  T(F): 98.3 (28 Mar 2018 11:00), Max: 98.5 (27 Mar 2018 20:15)  HR: 91 (28 Mar 2018 11:00) (90 - 94)  BP: 165/82 (28 Mar 2018 11:00) (131/81 - 165/82)  BP(mean): --  RR: 22 (28 Mar 2018 11:00) (17 - 22)  SpO2: 95% (28 Mar 2018 11:00) (94% - 100%)    Constitutional: frail looking  HEENT: NC/AT, EOMI, PERRLA  Neck: supple  Back: no tenderness  Respiratory: clear; can't cough  Cardiovascular: S1S2 regular, no murmurs  Abdomen: soft, not tender, mild distended, positive BS  Genitourinary: no LN palpable  Rectal: deferred  Musculoskeletal: no muscle tenderness, no joint swelling or tenderness  Extremities: no pedal edema  Neurological: see above; mot str 4/5 all extr              WEAKNESS  CDAD  CONFUSION  CHRONIC STEROIDS FOR RA  BREAST CA S/P RECENT CHEMO  ABNORMAL TSH      PLAN:      - MRI neg; c/w tele for poss arrhythmia  - 24 hr eeg per neuro  - METAB ENCEPH SEC TO DIARRHEA- PROBAB DEHYDRATED; RESOLVED  - tsh f/up as an outpt  - c/w po vanco 3

## 2019-06-04 NOTE — ED PROVIDER NOTE - CLINICAL SUMMARY MEDICAL DECISION MAKING FREE TEXT BOX
Patient with ESRD on HD presenting with mild fatigue and a recent presyncopal episode. Physical exam shows mild decreased breath sounds on bases, otherwise unremarkable exam, patient able to speak in full sentences without fatigue and is hemodynamically stable. Has been going to his HD appointments as usual. Will order labs, CXR, reassess.  ATTG: Dr. Soria

## 2019-06-04 NOTE — ED PROVIDER NOTE - NSFOLLOWUPINSTRUCTIONS_ED_ALL_ED_FT
1- Continue your close follow up with your nephrologist  2- Any worsening fatigue, lightheadedness, or any other concerns return to ER immediately

## 2019-06-04 NOTE — ED PROVIDER NOTE - CARE PROVIDER_API CALL
Esme Adams)  Internal Medicine; Nephrology  [address], [address]  [place] [address]  Phone: [phone]  Fax: [phone]  Follow Up Time:
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
## 2019-06-05 LAB
CULTURE RESULTS: SIGNIFICANT CHANGE UP
SPECIMEN SOURCE: SIGNIFICANT CHANGE UP

## 2019-06-09 ENCOUNTER — INPATIENT (INPATIENT)
Facility: HOSPITAL | Age: 64
LOS: 2 days | Discharge: HOME | DRG: 811 | End: 2019-06-12
Attending: INTERNAL MEDICINE | Admitting: INTERNAL MEDICINE
Payer: MEDICARE

## 2019-06-09 VITALS
SYSTOLIC BLOOD PRESSURE: 115 MMHG | RESPIRATION RATE: 18 BRPM | HEIGHT: 67 IN | HEART RATE: 87 BPM | OXYGEN SATURATION: 100 % | WEIGHT: 179.9 LBS | TEMPERATURE: 99 F | DIASTOLIC BLOOD PRESSURE: 61 MMHG

## 2019-06-09 LAB
ALBUMIN SERPL ELPH-MCNC: 3 G/DL — LOW (ref 3.5–5)
ALP SERPL-CCNC: 72 U/L — SIGNIFICANT CHANGE UP (ref 40–120)
ALT FLD-CCNC: 16 U/L DA — SIGNIFICANT CHANGE UP (ref 10–60)
ANION GAP SERPL CALC-SCNC: 10 MMOL/L — SIGNIFICANT CHANGE UP (ref 5–17)
APTT BLD: 28.7 SEC — SIGNIFICANT CHANGE UP (ref 27.5–36.3)
AST SERPL-CCNC: 11 U/L — SIGNIFICANT CHANGE UP (ref 10–40)
BASE EXCESS BLDA CALC-SCNC: 0.8 MMOL/L — SIGNIFICANT CHANGE UP (ref -2–2)
BASOPHILS # BLD AUTO: 0.02 K/UL — SIGNIFICANT CHANGE UP (ref 0–0.2)
BASOPHILS NFR BLD AUTO: 0.2 % — SIGNIFICANT CHANGE UP (ref 0–2)
BILIRUB SERPL-MCNC: 0.6 MG/DL — SIGNIFICANT CHANGE UP (ref 0.2–1.2)
BLOOD GAS COMMENTS ARTERIAL: SIGNIFICANT CHANGE UP
BUN SERPL-MCNC: 90 MG/DL — HIGH (ref 7–18)
CALCIUM SERPL-MCNC: 7.8 MG/DL — LOW (ref 8.4–10.5)
CHLORIDE SERPL-SCNC: 100 MMOL/L — SIGNIFICANT CHANGE UP (ref 96–108)
CO2 SERPL-SCNC: 25 MMOL/L — SIGNIFICANT CHANGE UP (ref 22–31)
CREAT SERPL-MCNC: 12.4 MG/DL — HIGH (ref 0.5–1.3)
CULTURE RESULTS: SIGNIFICANT CHANGE UP
CULTURE RESULTS: SIGNIFICANT CHANGE UP
D DIMER BLD IA.RAPID-MCNC: 630 NG/ML DDU — HIGH
EOSINOPHIL # BLD AUTO: 0.11 K/UL — SIGNIFICANT CHANGE UP (ref 0–0.5)
EOSINOPHIL NFR BLD AUTO: 0.9 % — SIGNIFICANT CHANGE UP (ref 0–6)
GLUCOSE SERPL-MCNC: 89 MG/DL — SIGNIFICANT CHANGE UP (ref 70–99)
HCO3 BLDA-SCNC: 25 MMOL/L — SIGNIFICANT CHANGE UP (ref 23–27)
HCT VFR BLD CALC: 24.9 % — LOW (ref 39–50)
HGB BLD-MCNC: 7.9 G/DL — LOW (ref 13–17)
HOROWITZ INDEX BLDA+IHG-RTO: 28 — SIGNIFICANT CHANGE UP
IMM GRANULOCYTES NFR BLD AUTO: 0.5 % — SIGNIFICANT CHANGE UP (ref 0–1.5)
INR BLD: 1.08 RATIO — SIGNIFICANT CHANGE UP (ref 0.88–1.16)
LYMPHOCYTES # BLD AUTO: 1.36 K/UL — SIGNIFICANT CHANGE UP (ref 1–3.3)
LYMPHOCYTES # BLD AUTO: 11.3 % — LOW (ref 13–44)
MCHC RBC-ENTMCNC: 29.7 PG — SIGNIFICANT CHANGE UP (ref 27–34)
MCHC RBC-ENTMCNC: 31.7 GM/DL — LOW (ref 32–36)
MCV RBC AUTO: 93.6 FL — SIGNIFICANT CHANGE UP (ref 80–100)
MONOCYTES # BLD AUTO: 1.47 K/UL — HIGH (ref 0–0.9)
MONOCYTES NFR BLD AUTO: 12.3 % — SIGNIFICANT CHANGE UP (ref 2–14)
NEUTROPHILS # BLD AUTO: 8.97 K/UL — HIGH (ref 1.8–7.4)
NEUTROPHILS NFR BLD AUTO: 74.8 % — SIGNIFICANT CHANGE UP (ref 43–77)
NRBC # BLD: 0 /100 WBCS — SIGNIFICANT CHANGE UP (ref 0–0)
NT-PROBNP SERPL-SCNC: 2951 PG/ML — HIGH (ref 0–125)
OB PNL STL: NEGATIVE — SIGNIFICANT CHANGE UP
PCO2 BLDA: 38 MMHG — SIGNIFICANT CHANGE UP (ref 32–46)
PH BLDA: 7.42 — SIGNIFICANT CHANGE UP (ref 7.35–7.45)
PLATELET # BLD AUTO: 220 K/UL — SIGNIFICANT CHANGE UP (ref 150–400)
PO2 BLDA: 85 MMHG — SIGNIFICANT CHANGE UP (ref 74–108)
POTASSIUM SERPL-MCNC: 5.8 MMOL/L — HIGH (ref 3.5–5.3)
POTASSIUM SERPL-SCNC: 5.8 MMOL/L — HIGH (ref 3.5–5.3)
PROT SERPL-MCNC: 7 G/DL — SIGNIFICANT CHANGE UP (ref 6–8.3)
PROTHROM AB SERPL-ACNC: 12 SEC — SIGNIFICANT CHANGE UP (ref 10–12.9)
RBC # BLD: 2.66 M/UL — LOW (ref 4.2–5.8)
RBC # FLD: 13.7 % — SIGNIFICANT CHANGE UP (ref 10.3–14.5)
SAO2 % BLDA: 93 % — SIGNIFICANT CHANGE UP (ref 92–96)
SODIUM SERPL-SCNC: 135 MMOL/L — SIGNIFICANT CHANGE UP (ref 135–145)
SPECIMEN SOURCE: SIGNIFICANT CHANGE UP
SPECIMEN SOURCE: SIGNIFICANT CHANGE UP
TROPONIN I SERPL-MCNC: <0.015 NG/ML — SIGNIFICANT CHANGE UP (ref 0–0.04)
WBC # BLD: 11.99 K/UL — HIGH (ref 3.8–10.5)
WBC # FLD AUTO: 11.99 K/UL — HIGH (ref 3.8–10.5)

## 2019-06-09 PROCEDURE — 93010 ELECTROCARDIOGRAM REPORT: CPT

## 2019-06-09 PROCEDURE — 71046 X-RAY EXAM CHEST 2 VIEWS: CPT | Mod: 26

## 2019-06-09 NOTE — ED ADULT NURSE NOTE - OBJECTIVE STATEMENT
patient came in the er with c/o sob today ,pt is aaox3,no acute distress noted at this time ,pt denies pain .

## 2019-06-09 NOTE — ED ADULT NURSE NOTE - NSIMPLEMENTINTERV_GEN_ALL_ED
Implemented All Fall Risk Interventions:  [place] to call system. Call bell, personal items and telephone within reach. Instruct patient to call for assistance. Room bathroom lighting operational. Non-slip footwear when patient is off stretcher. Physically safe environment: no spills, clutter or unnecessary equipment. Stretcher in lowest position, wheels locked, appropriate side rails in place. Provide visual cue, wrist band, yellow gown, etc. Monitor gait and stability. Monitor for mental status changes and reorient to person, place, and time. Review medications for side effects contributing to fall risk. Reinforce activity limits and safety measures with patient and family.

## 2019-06-09 NOTE — ED PROVIDER NOTE - CLINICAL SUMMARY MEDICAL DECISION MAKING FREE TEXT BOX
65 y/o F patient w/ ESRD presented to the ED w/ shortness of breath. Will get EKG, labs, and CXR. 63 y/o F patient w/ ESRD presented to the ED w/ shortness of breath. Will get EKG, labs, and CXR.    labs show H/H 7.9/24.9 (10/30 on June 4th), K 5.8, BUN 90, Cr 12-given slow NS bolus and lasix, trop neg, probnp 2900  discussed above with Dr. Arana who agrees with plan for admission and recommends hematology consult-MAR informed of above recs  discussed above with Dr. Li from nephrology who agrees with plan for scheduling HD today.  Discussed above with patient and family who agree with plan above.

## 2019-06-09 NOTE — ED PROVIDER NOTE - OBJECTIVE STATEMENT
63 y/o M patient w/ PMHx of IgA nephropathy, end stage renal disease on hemodialysis on Monday, Wednesday, and Friday, HTN, and CAD presents to the ED w/ shortness of breath that began earlier this morning associated w/ mild chest pressure. Patient denies fever, cough, swelling. Patient notes his last hemodialysis was x2 days ago. NKDA.

## 2019-06-09 NOTE — ED ADULT NURSE NOTE - ED STAT RN HANDOFF DETAILS
pt.is  awake and  responsive. transfer  to holding area c/o chest pain. dr. antonio/ barbi bowden   aware. v/s  stable.  on tele box B with  NSR. not  in  distress pt.is  awake and  responsive. transfer  to holding area report given  to jennifer rebolledo. c/o chest pain. dr. antonio/ barbi bowden   aware. v/s  stable.  on tele box B with  NSR. not  in  distress

## 2019-06-10 DIAGNOSIS — R07.9 CHEST PAIN, UNSPECIFIED: ICD-10-CM

## 2019-06-10 DIAGNOSIS — E87.5 HYPERKALEMIA: ICD-10-CM

## 2019-06-10 DIAGNOSIS — R06.02 SHORTNESS OF BREATH: ICD-10-CM

## 2019-06-10 DIAGNOSIS — N18.6 END STAGE RENAL DISEASE: ICD-10-CM

## 2019-06-10 DIAGNOSIS — D64.9 ANEMIA, UNSPECIFIED: ICD-10-CM

## 2019-06-10 DIAGNOSIS — I10 ESSENTIAL (PRIMARY) HYPERTENSION: ICD-10-CM

## 2019-06-10 DIAGNOSIS — Z29.9 ENCOUNTER FOR PROPHYLACTIC MEASURES, UNSPECIFIED: ICD-10-CM

## 2019-06-10 LAB
ANION GAP SERPL CALC-SCNC: 11 MMOL/L — SIGNIFICANT CHANGE UP (ref 5–17)
BUN SERPL-MCNC: 92 MG/DL — HIGH (ref 7–18)
CALCIUM SERPL-MCNC: 7.9 MG/DL — LOW (ref 8.4–10.5)
CHLORIDE SERPL-SCNC: 100 MMOL/L — SIGNIFICANT CHANGE UP (ref 96–108)
CK MB BLD-MCNC: <1.8 % — SIGNIFICANT CHANGE UP (ref 0–3.5)
CK MB BLD-MCNC: <1.9 % — SIGNIFICANT CHANGE UP (ref 0–3.5)
CK MB CFR SERPL CALC: <1 NG/ML — SIGNIFICANT CHANGE UP (ref 0–3.6)
CK MB CFR SERPL CALC: <1 NG/ML — SIGNIFICANT CHANGE UP (ref 0–3.6)
CK SERPL-CCNC: 54 U/L — SIGNIFICANT CHANGE UP (ref 35–232)
CK SERPL-CCNC: 56 U/L — SIGNIFICANT CHANGE UP (ref 35–232)
CO2 SERPL-SCNC: 23 MMOL/L — SIGNIFICANT CHANGE UP (ref 22–31)
CREAT SERPL-MCNC: 12.8 MG/DL — HIGH (ref 0.5–1.3)
FERRITIN SERPL-MCNC: 1143 NG/ML — HIGH (ref 30–400)
FOLATE SERPL-MCNC: 13 NG/ML — SIGNIFICANT CHANGE UP
GLUCOSE SERPL-MCNC: 93 MG/DL — SIGNIFICANT CHANGE UP (ref 70–99)
HBA1C BLD-MCNC: 5.4 % — SIGNIFICANT CHANGE UP (ref 4–5.6)
HCT VFR BLD CALC: 24.7 % — LOW (ref 39–50)
HCV AB S/CO SERPL IA: 0.11 S/CO — SIGNIFICANT CHANGE UP (ref 0–0.99)
HCV AB SERPL-IMP: SIGNIFICANT CHANGE UP
HGB BLD-MCNC: 7.8 G/DL — LOW (ref 13–17)
IRON SATN MFR SERPL: 13 % — LOW (ref 20–55)
IRON SATN MFR SERPL: 26 UG/DL — LOW (ref 65–170)
MAGNESIUM SERPL-MCNC: 2.4 MG/DL — SIGNIFICANT CHANGE UP (ref 1.6–2.6)
MCHC RBC-ENTMCNC: 29.9 PG — SIGNIFICANT CHANGE UP (ref 27–34)
MCHC RBC-ENTMCNC: 31.6 GM/DL — LOW (ref 32–36)
MCV RBC AUTO: 94.6 FL — SIGNIFICANT CHANGE UP (ref 80–100)
NRBC # BLD: 0 /100 WBCS — SIGNIFICANT CHANGE UP (ref 0–0)
PHOSPHATE SERPL-MCNC: 4.3 MG/DL — SIGNIFICANT CHANGE UP (ref 2.5–4.5)
PHOSPHATE SERPL-MCNC: 5 MG/DL — HIGH (ref 2.5–4.5)
PLATELET # BLD AUTO: 220 K/UL — SIGNIFICANT CHANGE UP (ref 150–400)
POTASSIUM SERPL-MCNC: 6.1 MMOL/L — HIGH (ref 3.5–5.3)
POTASSIUM SERPL-SCNC: 6.1 MMOL/L — HIGH (ref 3.5–5.3)
PROCALCITONIN SERPL-MCNC: 0.7 NG/ML — HIGH (ref 0.02–0.1)
RBC # BLD: 2.61 M/UL — LOW (ref 4.2–5.8)
RBC # FLD: 13.7 % — SIGNIFICANT CHANGE UP (ref 10.3–14.5)
SODIUM SERPL-SCNC: 134 MMOL/L — LOW (ref 135–145)
TIBC SERPL-MCNC: 194 UG/DL — LOW (ref 250–450)
TROPONIN I SERPL-MCNC: <0.015 NG/ML — SIGNIFICANT CHANGE UP (ref 0–0.04)
TROPONIN I SERPL-MCNC: <0.015 NG/ML — SIGNIFICANT CHANGE UP (ref 0–0.04)
TSH SERPL-MCNC: 0.85 UU/ML — SIGNIFICANT CHANGE UP (ref 0.34–4.82)
UIBC SERPL-MCNC: 168 UG/DL — SIGNIFICANT CHANGE UP (ref 110–370)
VIT B12 SERPL-MCNC: 679 PG/ML — SIGNIFICANT CHANGE UP (ref 232–1245)
WBC # BLD: 11.48 K/UL — HIGH (ref 3.8–10.5)
WBC # FLD AUTO: 11.48 K/UL — HIGH (ref 3.8–10.5)

## 2019-06-10 PROCEDURE — 78582 LUNG VENTILAT&PERFUS IMAGING: CPT | Mod: 26

## 2019-06-10 PROCEDURE — 99285 EMERGENCY DEPT VISIT HI MDM: CPT

## 2019-06-10 RX ORDER — ASPIRIN/CALCIUM CARB/MAGNESIUM 324 MG
325 TABLET ORAL ONCE
Refills: 0 | Status: COMPLETED | OUTPATIENT
Start: 2019-06-10 | End: 2019-06-10

## 2019-06-10 RX ORDER — SODIUM CHLORIDE 9 MG/ML
250 INJECTION INTRAMUSCULAR; INTRAVENOUS; SUBCUTANEOUS ONCE
Refills: 0 | Status: COMPLETED | OUTPATIENT
Start: 2019-06-10 | End: 2019-06-10

## 2019-06-10 RX ORDER — ERYTHROPOIETIN 10000 [IU]/ML
8000 INJECTION, SOLUTION INTRAVENOUS; SUBCUTANEOUS
Refills: 0 | Status: DISCONTINUED | OUTPATIENT
Start: 2019-06-10 | End: 2019-06-12

## 2019-06-10 RX ORDER — PANTOPRAZOLE SODIUM 20 MG/1
40 TABLET, DELAYED RELEASE ORAL
Refills: 0 | Status: DISCONTINUED | OUTPATIENT
Start: 2019-06-10 | End: 2019-06-12

## 2019-06-10 RX ORDER — ACETAMINOPHEN 500 MG
650 TABLET ORAL EVERY 6 HOURS
Refills: 0 | Status: DISCONTINUED | OUTPATIENT
Start: 2019-06-10 | End: 2019-06-12

## 2019-06-10 RX ORDER — LABETALOL HCL 100 MG
100 TABLET ORAL
Refills: 0 | Status: DISCONTINUED | OUTPATIENT
Start: 2019-06-10 | End: 2019-06-12

## 2019-06-10 RX ORDER — SEVELAMER CARBONATE 2400 MG/1
1 POWDER, FOR SUSPENSION ORAL
Qty: 0 | Refills: 0 | DISCHARGE

## 2019-06-10 RX ORDER — ATORVASTATIN CALCIUM 80 MG/1
40 TABLET, FILM COATED ORAL AT BEDTIME
Refills: 0 | Status: DISCONTINUED | OUTPATIENT
Start: 2019-06-10 | End: 2019-06-12

## 2019-06-10 RX ORDER — HEPARIN SODIUM 5000 [USP'U]/ML
5000 INJECTION INTRAVENOUS; SUBCUTANEOUS EVERY 12 HOURS
Refills: 0 | Status: DISCONTINUED | OUTPATIENT
Start: 2019-06-10 | End: 2019-06-12

## 2019-06-10 RX ORDER — DIHYDROTACHYSTEROL 0.4 MG
1 TABLET ORAL
Qty: 0 | Refills: 0 | DISCHARGE

## 2019-06-10 RX ORDER — DARBEPOETIN ALFA IN POLYSORBAT 200MCG/0.4
0 PEN INJECTOR (ML) SUBCUTANEOUS
Qty: 0 | Refills: 0 | DISCHARGE

## 2019-06-10 RX ORDER — FAMOTIDINE 10 MG/ML
0 INJECTION INTRAVENOUS
Qty: 0 | Refills: 0 | DISCHARGE

## 2019-06-10 RX ORDER — DOXERCALCIFEROL 2.5 UG/1
2 CAPSULE ORAL
Qty: 0 | Refills: 0 | DISCHARGE

## 2019-06-10 RX ORDER — CLOPIDOGREL BISULFATE 75 MG/1
1 TABLET, FILM COATED ORAL
Qty: 0 | Refills: 0 | DISCHARGE

## 2019-06-10 RX ORDER — CALCIUM ACETATE 667 MG
2 TABLET ORAL
Qty: 0 | Refills: 0 | DISCHARGE

## 2019-06-10 RX ORDER — SODIUM POLYSTYRENE SULFONATE 4.1 MEQ/G
15 POWDER, FOR SUSPENSION ORAL ONCE
Refills: 0 | Status: COMPLETED | OUTPATIENT
Start: 2019-06-10 | End: 2019-06-10

## 2019-06-10 RX ORDER — ASPIRIN/CALCIUM CARB/MAGNESIUM 324 MG
81 TABLET ORAL DAILY
Refills: 0 | Status: DISCONTINUED | OUTPATIENT
Start: 2019-06-10 | End: 2019-06-12

## 2019-06-10 RX ORDER — CHLORHEXIDINE GLUCONATE 213 G/1000ML
1 SOLUTION TOPICAL
Refills: 0 | Status: DISCONTINUED | OUTPATIENT
Start: 2019-06-10 | End: 2019-06-12

## 2019-06-10 RX ORDER — IRON SUCROSE 20 MG/ML
50 INJECTION, SOLUTION INTRAVENOUS
Qty: 0 | Refills: 0 | DISCHARGE

## 2019-06-10 RX ORDER — CALCIUM ACETATE 667 MG
1 TABLET ORAL
Qty: 0 | Refills: 0 | DISCHARGE

## 2019-06-10 RX ORDER — IRON SUCROSE 20 MG/ML
100 INJECTION, SOLUTION INTRAVENOUS ONCE
Refills: 0 | Status: COMPLETED | OUTPATIENT
Start: 2019-06-10 | End: 2019-06-10

## 2019-06-10 RX ORDER — CALCIUM ACETATE 667 MG
1334 TABLET ORAL
Refills: 0 | Status: DISCONTINUED | OUTPATIENT
Start: 2019-06-10 | End: 2019-06-12

## 2019-06-10 RX ORDER — FUROSEMIDE 40 MG
40 TABLET ORAL ONCE
Refills: 0 | Status: COMPLETED | OUTPATIENT
Start: 2019-06-10 | End: 2019-06-10

## 2019-06-10 RX ORDER — ACETAMINOPHEN AND CHLORPHENIRAMINE MALEATE 325; 2 MG/1; MG/1
0 TABLET ORAL
Qty: 0 | Refills: 0 | DISCHARGE

## 2019-06-10 RX ADMIN — PANTOPRAZOLE SODIUM 40 MILLIGRAM(S): 20 TABLET, DELAYED RELEASE ORAL at 06:08

## 2019-06-10 RX ADMIN — HEPARIN SODIUM 5000 UNIT(S): 5000 INJECTION INTRAVENOUS; SUBCUTANEOUS at 05:39

## 2019-06-10 RX ADMIN — ERYTHROPOIETIN 8000 UNIT(S): 10000 INJECTION, SOLUTION INTRAVENOUS; SUBCUTANEOUS at 15:37

## 2019-06-10 RX ADMIN — Medication 1334 MILLIGRAM(S): at 11:53

## 2019-06-10 RX ADMIN — Medication 650 MILLIGRAM(S): at 20:05

## 2019-06-10 RX ADMIN — HEPARIN SODIUM 5000 UNIT(S): 5000 INJECTION INTRAVENOUS; SUBCUTANEOUS at 18:51

## 2019-06-10 RX ADMIN — ATORVASTATIN CALCIUM 40 MILLIGRAM(S): 80 TABLET, FILM COATED ORAL at 20:05

## 2019-06-10 RX ADMIN — Medication 1 TABLET(S): at 11:34

## 2019-06-10 RX ADMIN — Medication 81 MILLIGRAM(S): at 11:34

## 2019-06-10 RX ADMIN — Medication 1334 MILLIGRAM(S): at 11:33

## 2019-06-10 RX ADMIN — SODIUM POLYSTYRENE SULFONATE 15 GRAM(S): 4.1 POWDER, FOR SUSPENSION ORAL at 06:08

## 2019-06-10 RX ADMIN — Medication 325 MILLIGRAM(S): at 05:39

## 2019-06-10 RX ADMIN — Medication 650 MILLIGRAM(S): at 20:35

## 2019-06-10 RX ADMIN — IRON SUCROSE 210 MILLIGRAM(S): 20 INJECTION, SOLUTION INTRAVENOUS at 03:47

## 2019-06-10 RX ADMIN — Medication 1334 MILLIGRAM(S): at 18:51

## 2019-06-10 RX ADMIN — Medication 100 MILLIGRAM(S): at 18:50

## 2019-06-10 RX ADMIN — SODIUM CHLORIDE 250 MILLILITER(S): 9 INJECTION INTRAMUSCULAR; INTRAVENOUS; SUBCUTANEOUS at 02:12

## 2019-06-10 RX ADMIN — Medication 100 MILLIGRAM(S): at 05:39

## 2019-06-10 RX ADMIN — Medication 40 MILLIGRAM(S): at 02:12

## 2019-06-10 NOTE — H&P ADULT - PROBLEM SELECTOR PLAN 1
p/w sob with mild cp   likely symptomatic  anemia, less likely fluid overload   Hb 7.9, baseline hb 10  FOBT negative   will give venofer 100mg x1 during HD on Monday   duo for HD on Monday  f/u anemia panel   f/u repeat CBC

## 2019-06-10 NOTE — H&P ADULT - NSHPPHYSICALEXAM_GEN_ALL_CORE
Vital Signs Last 24 Hrs  T(C): 37.1 (09 Jun 2019 21:25), Max: 37.1 (09 Jun 2019 21:25)  T(F): 98.8 (09 Jun 2019 21:25), Max: 98.8 (09 Jun 2019 21:25)  HR: 77 (10 Uziel 2019 00:00) (77 - 87)  BP: 107/57 (10 Uziel 2019 00:00) (107/57 - 115/61)  BP(mean): --  RR: 27 (10 Uziel 2019 00:00) (18 - 27)  SpO2: 99% (10 Uziel 2019 00:00) (93% - 100%)

## 2019-06-10 NOTE — H&P ADULT - PROBLEM SELECTOR PLAN 4
ESRD on HD (MWF via LUE AVF at [facility],, last HD on Fri)  primary nephrologist DR. Esme Adams  plan  for HD on Monday   Nephro consulted DR. Li by ED

## 2019-06-10 NOTE — H&P ADULT - NSHPLABSRESULTS_GEN_ALL_CORE
Complete Blood Count + Automated Diff (06.09.19 @ 22:03)    WBC Count: 11.99 K/uL    RBC Count: 2.66 M/uL    Hemoglobin: 7.9 g/dL    Hematocrit: 24.9 %    Mean Cell Volume: 93.6 fl    Mean Cell Hemoglobin: 29.7 pg    Mean Cell Hemoglobin Conc: 31.7 gm/dL    Red Cell Distrib Width: 13.7 %    Platelet Count - Automated: 220 K/uL    Auto Neutrophil #: 8.97 K/uL    Auto Lymphocyte #: 1.36 K/uL    Auto Monocyte #: 1.47 K/uL    Auto Eosinophil #: 0.11 K/uL    Auto Basophil #: 0.02 K/uL    Auto Neutrophil %: 74.8: Differential percentages must be correlated with absolute numbers for  clinical significance. %    Auto Lymphocyte %: 11.3 %    Auto Monocyte %: 12.3 %    Auto Eosinophil %: 0.9 %    Auto Basophil %: 0.2 %    Auto Immature Granulocyte %: 0.5 %    Nucleated RBC: 0 /100 WBCs  Comprehensive Metabolic Panel (06.09.19 @ 22:03)    Sodium, Serum: 135 mmol/L    Potassium, Serum: 5.8 mmol/L    Chloride, Serum: 100 mmol/L    Carbon Dioxide, Serum: 25 mmol/L    Anion Gap, Serum: 10 mmol/L    Blood Urea Nitrogen, Serum: 90 mg/dL    Creatinine, Serum: 12.40 mg/dL    Glucose, Serum: 89 mg/dL    Calcium, Total Serum: 7.8 mg/dL    Protein Total, Serum: 7.0 g/dL    Albumin, Serum: 3.0 g/dL    Bilirubin Total, Serum: 0.6 mg/dL    Alkaline Phosphatase, Serum: 72 U/L    Aspartate Aminotransferase (AST/SGOT): 11 U/L    Alanine Aminotransferase (ALT/SGPT): 16 U/L DA    eGFR if Non : 4:    Blood Gas Profile - Arterial (06.09.19 @ 22:32)    pH, Arterial: 7.42    pCO2, Arterial: 38 mmHg    pO2, Arterial: 85 mmHg    HCO3, Arterial: 25 mmol/L    Base Excess, Arterial: 0.8 mmol/L    Oxygen Saturation, Arterial: 93 %    FIO2, Arterial: 28    Blood Gas Comments Arterial: RB

## 2019-06-10 NOTE — H&P ADULT - NSICDXPASTMEDICALHX_GEN_ALL_CORE_FT
PAST MEDICAL HISTORY:  ESRD (end stage renal disease) on dialysis Started Hemodialysis 8/2017 on Mon./ Wed/ Fri.    High cholesterol     HTN (hypertension)     IgA nephropathy     MTHFR mutation

## 2019-06-10 NOTE — H&P ADULT - PROBLEM SELECTOR PLAN 7
IMPROVE VTE Individual Risk Assessment    RISK                                                          Points  [] Previous VTE                                           3  [] Thrombophilia                                        2  [] Lower limb paralysis                              2   [] Current Cancer                                       2   [x] Immobilization > 24 hrs                        1  [] ICU/CCU stay > 24 hours                       1  [x Age > 60                                                   1    IMPROVE VTE Score: 2  on dvt and gi ppx

## 2019-06-10 NOTE — H&P ADULT - PROBLEM SELECTOR PLAN 3
p/w pressure likely cp  HEART SCORE 4, will r/o acs  Troponin negative x1  EKG noted nsr, no changes compared to prior EKG.   on tele   on asa, lipitor and beta blocker   trend CE X3 p/w pressure likely cp  HEART SCORE 4, will r/o acs  Troponin negative x1  EKG noted nsr, no changes compared to prior EKG.   on tele   on asa, lipitor and beta blocker   trend CE X3  cardio Dr. Berger

## 2019-06-10 NOTE — H&P ADULT - HISTORY OF PRESENT ILLNESS
65 yo M from home with PMH of IgA nephropathy, ESRD on HD (MWF via LUE AVF at [facility],, last HD on Fri), HTN and CAD (S/P stents x2 12/2018) presents to the ED w/ shortness of breath that began earlier yesterday morning associated w/ mild chest pressure. Patient denies fever, cough, swelling. Patient notes his last hemodialysis was on Friday 06/07. Pt denies fever, chills, cp, abd pain, n/v/c/d , dysuria or other complains.     ED course, pt hemodynamic stable 65 yo M from home with PMH of IgA nephropathy, ESRD on HD (MWF via LUE AVF at [facility],, last HD on Fri), HTN and CAD (S/P stents x2 12/2018) presents to the ED w/ shortness of breath that began earlier yesterday morning associated w/ mild chest pressure. pt also c/o substernal chest pain, consistent, pressure like pain, non radiate, 6-7/10, and left arm numbness. Patient denies fever, chills, focal weakness, cough, swelling, melena, brbpr, hematemesis, abd pain, n/v/c/d or other complains . Patient notes his last hemodialysis was on Friday 06/07 and pt is anuria.     ED course, pt hemodynamic stable, labs noted wbc 11.9, Hb 7.9, FOBT negative, k 5.8, BUN/Cr  90/12.4, Troponin negative x1, EKG noted nsr, no changes compared to prior EKG.     GOC: pt is full code

## 2019-06-10 NOTE — CONSULT NOTE ADULT - ASSESSMENT
ESRD due to IgA nephropathy  Atypical chest pain in cande past.  Dyspnea on and off .  Anemia , patient was off RAMONA.  Increased creatinine and BUN ? uremia.    To have dialysis today, 3.5 hours.  Start Epogen , continue weekly venofer. ESRD due to IgA nephropathy  Chest pain on Sunday for the last 3 weeks, ? cad versus uremia and pericarditis.    Anemia , patient was off RAMONA.  Increased creatinine and BUN ? uremia.  Hyperkalemia , dialysis and 2 gm K diet  To have dialysis today, 3.5 hours, 2 kg UF  Cardiology follow up.  Start Epogen 8000 tiw, continue weekly venofer.

## 2019-06-10 NOTE — CONSULT NOTE ADULT - SUBJECTIVE AND OBJECTIVE BOX
CHIEF COMPLAINT:Patient is a 64y old  Male who presents with a chief complaint of shortness of breath (10 Uziel 2019 10:42)      HPI:  64 yr old Male from home with PMHX of IgA nephropathy, ESRD on HD (MWF via LUE AVF at [facility],, last HD on Fri), HTN and CAD (S/P stents x2 12/2018) presents to the ED w/ shortness of breath that began earlier yesterday morning associated w/ mild chest pressure. pt also c/o substernal chest pain, consistent, pressure like pain, non radiate, 6-7/10, and left arm numbness. Patient denies fever, chills, focal weakness, cough, swelling, melena, brbpr, hematemesis, abd pain, n/v/c/d or other complains . Patient notes his last hemodialysis was on Friday 06/07 and pt is anuria. ED course, pt hemodynamic stable, labs noted wbc 11.9, Hb 7.9, FOBT negative, k 5.8, BUN/Cr  90/12.4, Troponin negative x1, EKG noted nsr, no changes compared to prior EKG.           PAST MEDICAL & SURGICAL HISTORY:  MTHFR mutation  ESRD (end stage renal disease) on dialysis: Started Hemodialysis 8/2017 on Mon./ Wed/ Fri.  IgA nephropathy  High cholesterol  HTN (hypertension)        MEDICATIONS  (STANDING):  aspirin  chewable 81 milliGRAM(s) Oral daily  atorvastatin 40 milliGRAM(s) Oral at bedtime  calcium acetate 1334 milliGRAM(s) Oral three times a day with meals  chlorhexidine 4% Liquid 1 Application(s) Topical <User Schedule>  epoetin rachel Injectable 8000 Unit(s) IV Push <User Schedule>  heparin  Injectable 5000 Unit(s) SubCutaneous every 12 hours  labetalol 100 milliGRAM(s) Oral two times a day  multivitamin 1 Tablet(s) Oral daily  pantoprazole    Tablet 40 milliGRAM(s) Oral before breakfast    MEDICATIONS  (PRN):      FAMILY HISTORY:  No pertinent family history in first degree relatives      SOCIAL HISTORY:    [x ] Non-smoker    [x ] Alcohol-denies    Allergies    No Known Allergies    Intolerances    	    REVIEW OF SYSTEMS:  CONSTITUTIONAL: No fever, weight loss, or fatigue  EYES: No eye pain, visual disturbances, or discharge  ENT:  No difficulty hearing, tinnitus, vertigo; No sinus or throat pain  NECK: No pain or stiffness  RESPIRATORY: No cough, wheezing, chills or hemoptysis; + Shortness of Breath  CARDIOVASCULAR: + chest pain, No palpitations, passing out, dizziness, or leg swelling  GASTROINTESTINAL: No abdominal or epigastric pain. No nausea, vomiting, or hematemesis; No diarrhea or constipation. No melena or hematochezia.  GENITOURINARY: No dysuria, frequency, hematuria, or incontinence  NEUROLOGICAL: No headaches, memory loss, loss of strength, numbness, or tremors  SKIN: No itching, burning, rashes, or lesions   LYMPH Nodes: No enlarged glands  ENDOCRINE: No heat or cold intolerance; No hair loss  MUSCULOSKELETAL: No joint pain or swelling; No muscle, back, or extremity pain  PSYCHIATRIC: No depression, anxiety, mood swings, or difficulty sleeping  HEME/LYMPH: No easy bruising, or bleeding gums  ALLERGY AND IMMUNOLOGIC: No hives or eczema	      PHYSICAL EXAM:  T(C): 36.9 (06-10-19 @ 11:31), Max: 37.2 (06-10-19 @ 02:03)  HR: 88 (06-10-19 @ 11:31) (77 - 90)  BP: 131/73 (06-10-19 @ 11:31) (107/57 - 131/73)  RR: 18 (06-10-19 @ 11:31) (18 - 38)  SpO2: 96% (06-10-19 @ 11:31) (93% - 100%)  Wt(kg): --  I&O's Summary      Appearance: Normal	  HEENT:   Normal oral mucosa, PERRL, EOMI	  Lymphatic: No lymphadenopathy  Cardiovascular: Normal S1 S2, No JVD, No murmurs, No edema  Respiratory: Lungs clear to auscultation	  Psychiatry: A & O x 3, Mood & affect appropriate  Gastrointestinal:  Soft, Non-tender, + BS	  Skin: No rashes, No ecchymoses, No cyanosis	  Neurologic: Non-focal  Extremities: Normal range of motion, No clubbing, cyanosis or edema  Vascular: Peripheral pulses palpable 2+ bilaterally        ECG:  NORMAL SINUS RHYTHM  LEFT AXIS DEVIATION  INCOMPLETE RIGHT BUNDLE BRANCH BLOCK  	    	  LABS:	 	    CARDIAC MARKERS:  CARDIAC MARKERS ( 10 Uziel 2019 12:15 )  <0.015 ng/mL / x     / 54 U/L / x     / <1.0 ng/mL  CARDIAC MARKERS ( 10 Uziel 2019 05:57 )  <0.015 ng/mL / x     / 56 U/L / x     / <1.0 ng/mL  CARDIAC MARKERS ( 09 Jun 2019 22:03 )  <0.015 ng/mL / x     / x     / x     / x                                  7.8    11.48 )-----------( 220      ( 10 Uziel 2019 03:33 )             24.7     06-10    134<L>  |  100  |  92<H>  ----------------------------<  93  6.1<H>   |  23  |  12.80<H>    Ca    7.9<L>      10 Uziel 2019 02:43  Phos  5.0     06-10  Mg     2.4     06-10    TPro  7.0  /  Alb  3.0<L>  /  TBili  0.6  /  DBili  x   /  AST  11  /  ALT  16  /  AlkPhos  72  06-09    proBNP: Serum Pro-Brain Natriuretic Peptide: 2951 pg/mL (06-09 @ 22:03)      HgA1c: Hemoglobin A1C, Whole Blood: 5.4 % (06-10 @ 10:36)    TSH: Thyroid Stimulating Hormone, Serum: 0.85 uU/mL (06-10 @ 02:43)      EXAM:  NM PULM VENTILATION PERFUS IMG                            PROCEDURE DATE:  06/10/2019          INTERPRETATION:  CLINICAL INFORMATION: 64 year-old male with shortness of   breath, elevated d-dimer and anemia, referred to evaluate for pulmonary   embolism.    RADIOPHARMACEUTICAL: 1 mCi Tc-99m-DTPA by inhalation; 6.1 mCi Tc-99m-MAA,   I.V.    TECHNIQUE:  Ventilation and perfusion images of the lungs were obtained   following administration of Tc-99m-DTPA and Tc-99m-MAA. Images were   obtained in the anterior, posterior, both lateral, and all 4 oblique   projections. This study was interpreted in conjunction with a chest   radiograph of 6/9/2019    COMPARISON: No previous lung V/Q scan for comparison     FINDINGS: There is heterogeneous radiotracer distribution in the lungs on   both the ventilation and the perfusion images. There are no segmental   perfusion defects.      IMPRESSION:  Very low probability of pulmonary embolus.    Iron with Total Binding Capacity (06.10.19 @ 02:43)    Iron - Total Binding Capacity.: 194 ug/dL    % Saturation, Iron: 13 %    Iron Total, Serum: 26 ug/dL    Unsaturated Iron Binding Capacity: 168 ug/dL      Echocardiogram 1/19:    OBSERVATIONS:  Mitral Valve: Normal mitral valve. Mild mitral  regurgitation.  Aortic Root: Normal aortic root.  Aortic Valve: Normal trileaflet aortic valve.  Left Atrium: Normal left atrium.  LA volume index = 34  cc/m2.  Left Ventricle:Normal left ventricular systolic function.  Normal left ventricular internal dimensions and wall  thicknesses.  Right Heart: Normal right atrium. Normal right ventricular  size and function. There is trace tricuspid regurgitation.  There is trace pulmonic regurgitation.  Pericardium/PleuraNormal pericardium with no pericardial  effusion.  Hemodynamic: Incomplete tricuspid regurgitation jet  precludes accurate assessment of pulmonary artery systolic  pressure.

## 2019-06-10 NOTE — H&P ADULT - ASSESSMENT
65 yo M  with PMH of IgA nephropathy, ESRD on HD (MWF via LUE AVF at [facility],, last HD on Fri), HTN and CAD (S/P stents x2 12/2018) presents to the ED w/ shortness of breath that began earlier yesterday morning associated w/ mild chest pressure. pt also c/o substernal chest pain, consistent, pressure like pain, non radiate, 6-7/10, and left arm numbness.

## 2019-06-10 NOTE — CONSULT NOTE ADULT - SUBJECTIVE AND OBJECTIVE BOX
63 years old with ESRD due to IgA nephropathy, came to [facility] ER c/o chest pain and SOB for the last 2 days.  As per Dr. Adams he get those symptoms on and off for [facility] last few weeks.  He has similar complains in 01/2019.  He has a history of CAD and outpatient cardiology.    His hemoglobin dropped to 7.9 , he was off RAMONA for the last few wekks due to elaveted H/H      VQS was negative for PE.  XR chets with no CHF.    01/2019 ECHO  CONCLUSIONS:  1. Mild mitral regurgitation.  2. Normal left ventricular internal dimensions and wall  thicknesses.  3. Normal left ventricular systolic function.  4. Normal right ventricular size and function.      PAST T MEDICAL & SURGICAL HISTORY:  MTHFR mutation  ESRD (end stage renal disease) on dialysis: Started Hemodialysis 8/2017 on Mon./ Wed/ Fri.  IgA nephropathy  High cholesterol  HTN (hypertension)  No significant past surgical history      Home Medications Reviewed    Hospital Medications:   MEDICATIONS  (STANDING):  aspirin  chewable 81 milliGRAM(s) Oral daily  atorvastatin 40 milliGRAM(s) Oral at bedtime  calcium acetate 1334 milliGRAM(s) Oral three times a day with meals  chlorhexidine 4% Liquid 1 Application(s) Topical <User Schedule>  heparin  Injectable 5000 Unit(s) SubCutaneous every 12 hours  labetalol 100 milliGRAM(s) Oral two times a day  multivitamin 1 Tablet(s) Oral daily  pantoprazole    Tablet 40 milliGRAM(s) Oral before breakfast    MEDICATIONS  (PRN):      Allergies    No Known Allergies    Intolerances                              7.8    11.48 )-----------( 220      ( 10 Uziel 2019 03:33 )             24.7     06-10    134<L>  |  100  |  92<H>  ----------------------------<  93  6.1<H>   |  23  |  12.80<H>    Ca    7.9<L>      10 Uziel 2019 02:43  Phos  4.3     06-10  Mg     2.4     06-10    TPro  7.0  /  Alb  3.0<L>  /  TBili  0.6  /  DBili  x   /  AST  11  /  ALT  16  /  AlkPhos  72  06-09    PT/INR - ( 09 Jun 2019 22:03 )   PT: 12.0 sec;   INR: 1.08 ratio         PTT - ( 09 Jun 2019 22:03 )  PTT:28.7 sec      ABG - ( 09 Jun 2019 22:32 )  pH, Arterial: 7.42  pH, Blood: x     /  pCO2: 38    /  pO2: 85    / HCO3: 25    / Base Excess: 0.8   /  SaO2: 93            RADIOLOGY & ADDITIONAL STUDIES:  PA and lateral chest x-rays are compared to a previous examination dated   6/4/2019.    Impression: New mild left pleural effusion; underlying left basilar   pulmonary infiltrate cannot be excluded.    No evidence for pneumothorax.    The trachea is midline.    Stable enlargement of the cardiac silhouette.    New mild central pulmonary vascular congestion.      SOCIAL HISTORY: Denies ETOh,Smoking,     FAMILY HISTORY:  No pertinent family history in first degree relatives      REVIEW OF SYSTEMS:  CONSTITUTIONAL: No malaise, No fatigue, No fevers or chills, well developed, no diaphoresis  EYES/ENT: No visual changes;  No vertigo or throat pain   NECK: No pain or stiffness  RESPIRATORY: No cough, wheezing, hemoptysis; No shortness of breath  CARDIOVASCULAR: No chest pain or palpitations. No edema  GASTROINTESTINAL: No abdominal or epigastric pain. No nausea, vomiting, or hematemesis; No diarrhea or constipation. No melena or hematochezia.  GENITOURINARY: No dysuria, frequency, foamy urine, urinary urgency, incontinence or hematuria  NEUROLOGICAL: No numbness or weakness, No tremor  SKIN: No itching, burning, rashes, or lesions   VASCULAR: No claudication  Musculoskeletal: no arthralgia, no myalgia  All other review of systems is negative unless indicated above.    VITALS:  Vital Signs Last 24 Hrs  T(C): 37.1 (10 Uziel 2019 07:36), Max: 37.2 (10 Uziel 2019 02:03)  T(F): 98.8 (10 Uziel 2019 07:36), Max: 99 (10 Uziel 2019 02:03)  HR: 88 (10 Uziel 2019 07:36) (77 - 90)  BP: 121/64 (10 Uziel 2019 07:36) (107/57 - 123/64)  BP(mean): --  RR: 19 (10 Uziel 2019 07:36) (18 - 38)  SpO2: 97% (10 Uziel 2019 07:36) (93% - 100%)    Height (cm): 170.18 (06-09 @ 21:25)  Weight (kg): 81.6 (06-09 @ 21:25)  BMI (kg/m2): 28.2 (06-09 @ 21:25)  BSA (m2): 1.93 (06-09 @ 21:25)    PHYSICAL EXAM:  Constitutional: NAD  HEENT: anicteric sclera, oropharynx clear, MMM  Neck: No JVD  Respiratory: good air entrance B/L, no wheezes, rales or rhonchi  Cardiovascular: S1, S2, RRR, no pericardial rub, no murmur  Gastrointestinal: BS+, soft, no tenderness, no distension, no bruit  Pelvis: bladder non-distended, no CVA tenderness  Extremities: No cyanosis or clubbing. No peripheral edema  Neurological: A/O x 3, no focal deficits 63 years old with ESRD due to IgA nephropathy, came to [facility] ER c/o chest pain and SOB for the last 2 days.  As per patient's wife he has been having C/P in the last 3 sudays , for the last 3 weeks. He was in [facility] last week for same issue and was sent home.  He c/o pressure pain for several hours yesterday and came to the ER for revaluation.   VQ scan was negative for pathology. xr chest mild chf and pleural effusion  Hemoglobin was 7.9 , lower than last week, he was off RAMONA in the last month due to elevated H/H.      He has a history of CAD and outpatient cardiology, 2 stents were placed in January of 2019, done through the transplant center.    01/2019 ECHO  CONCLUSIONS:  1. Mild mitral regurgitation.  2. Normal left ventricular internal dimensions and wall  thicknesses.  3. Normal left ventricular systolic function.  4. Normal right ventricular size and function.      PAST T MEDICAL & SURGICAL HISTORY:  MTHFR mutation  ESRD (end stage renal disease) on dialysis: Started Hemodialysis 8/2017 on Mon./ Wed/ Fri.  IgA nephropathy  High cholesterol  HTN (hypertension)  No significant past surgical history      Home Medications Reviewed    Hospital Medications:   MEDICATIONS  (STANDING):  aspirin  chewable 81 milliGRAM(s) Oral daily  atorvastatin 40 milliGRAM(s) Oral at bedtime  calcium acetate 1334 milliGRAM(s) Oral three times a day with meals  chlorhexidine 4% Liquid 1 Application(s) Topical <User Schedule>  heparin  Injectable 5000 Unit(s) SubCutaneous every 12 hours  labetalol 100 milliGRAM(s) Oral two times a day  multivitamin 1 Tablet(s) Oral daily  pantoprazole    Tablet 40 milliGRAM(s) Oral before breakfast    MEDICATIONS  (PRN):      Allergies    No Known Allergies    Intolerances                              7.8    11.48 )-----------( 220      ( 10 Uziel 2019 03:33 )             24.7     06-10    134<L>  |  100  |  92<H>  ----------------------------<  93  6.1<H>   |  23  |  12.80<H>    Ca    7.9<L>      10 Uziel 2019 02:43  Phos  4.3     06-10  Mg     2.4     06-10    TPro  7.0  /  Alb  3.0<L>  /  TBili  0.6  /  DBili  x   /  AST  11  /  ALT  16  /  AlkPhos  72  06-09    PT/INR - ( 09 Jun 2019 22:03 )   PT: 12.0 sec;   INR: 1.08 ratio         PTT - ( 09 Jun 2019 22:03 )  PTT:28.7 sec      ABG - ( 09 Jun 2019 22:32 )  pH, Arterial: 7.42  pH, Blood: x     /  pCO2: 38    /  pO2: 85    / HCO3: 25    / Base Excess: 0.8   /  SaO2: 93        Troponin I, Serum: <0.015: The new reference range for Troponin-I performed on the Siemens Vista  system is 0.015-0.045 ng/mL, which includes the 99th percentile of a  Serum Pro-Brain Natriuretic Peptide: 2951: Interpretive guide for NT PRO-BNP  Rule out Acute CHF < 300 pg/mL (All ages).  Rule in Acute CHF (80-90% predictive value)   < 50 years old > 450 pg/mL.   50-75 years old > 900 pg/mL.   > 75 years old > 1800 pg/mL. pg/mL (06.09.19 @ 22:03)        RADIOLOGY & ADDITIONAL STUDIES:  PA and lateral chest x-rays are compared to a previous examination dated   6/4/2019.    Impression: New mild left pleural effusion; underlying left basilar   pulmonary infiltrate cannot be excluded.    No evidence for pneumothorax.    The trachea is midline.    Stable enlargement of the cardiac silhouette.    New mild central pulmonary vascular congestion.      SOCIAL HISTORY: Denies ETOh,Smoking,     FAMILY HISTORY:  No pertinent family history in first degree relatives      REVIEW OF SYSTEMS:  CONSTITUTIONAL: No malaise, No fatigue, No fevers or chills, well developed, no diaphoresis  EYES/ENT: No visual changes;  No vertigo or throat pain   NECK: No pain or stiffness  RESPIRATORY: dyspnea after 2 blocks walking  CARDIOVASCULAR: No chest pain or palpitations. No edema  GASTROINTESTINAL: No abdominal or epigastric pain. No nausea, vomiting, or hematemesis; No diarrhea or constipation. No melena or hematochezia.  GENITOURINARY: No dysuria, frequency, foamy urine, urinary urgency, incontinence or hematuria  NEUROLOGICAL: No numbness or weakness, No tremor  SKIN: No itching, burning, rashes, or lesions   Claudication after walking 3 blocks    VITALS:  Vital Signs Last 24 Hrs  T(C): 37.1 (10 Uziel 2019 07:36), Max: 37.2 (10 Uziel 2019 02:03)  T(F): 98.8 (10 Uziel 2019 07:36), Max: 99 (10 Uziel 2019 02:03)  HR: 88 (10 Uziel 2019 07:36) (77 - 90)  BP: 121/64 (10 Uziel 2019 07:36) (107/57 - 123/64)  BP(mean): --  RR: 19 (10 Uziel 2019 07:36) (18 - 38)  SpO2: 97% (10 Uziel 2019 07:36) (93% - 100%)    Height (cm): 170.18 (06-09 @ 21:25)  Weight (kg): 81.6 (06-09 @ 21:25)  BMI (kg/m2): 28.2 (06-09 @ 21:25)  BSA (m2): 1.93 (06-09 @ 21:25)    PHYSICAL EXAM:  Constitutional: NAD  HEENT: anicteric sclera, oropharynx clear, MMM  Neck: No JVD  Respiratory: good air entrance B/L, no wheezes, rales or rhonchi  Cardiovascular: S1, S2, RRR, no pericardial rub, no murmur  Gastrointestinal: BS+, soft, no tenderness, no distension, no bruit  Pelvis: bladder non-distended, no CVA tenderness  Extremities: No cyanosis or clubbing. No peripheral edema  Neurological: A/O x 3, no focal deficits  Left AVF with bruti and thrill

## 2019-06-10 NOTE — H&P ADULT - ATTENDING COMMENTS
Above  noted  63 yo M from home with PMH of IgA nephropathy, ESRD on HD (MWF via LUE AVF at [facility],, last HD on Fri), HTN and CAD (S/P stents x2 12/2018) presents to the ED w/ shortness of breath that began earlier yesterday morning associated w/ mild chest pressure. pt also c/o substernal chest pain, consistent, pressure like pain, non radiate, 6-7/10, and left arm numbness. Patient denies fever, chills, focal weakness, cough, swelling, melena, brbpr, hematemesis, abd pain, n/v/c/d or other complains . Patient notes his last hemodialysis was on Friday 06/07 and pt is anuria.     ED course, pt hemodynamic stable, labs noted wbc 11.9, Hb 7.9, FOBT negative, k 5.8, BUN/Cr  90/12.4, Troponin negative x1, EKG noted nsr, no changes compared to prior EKG.   Blood test radiology reports reviewed    Impression  Symptomatic  Anemia    SOB Palpitations chest Pain   r/o PE    HTN with  CKD stage  5    CAD with stents    ESRD on HD  Ig A nephropathy   Admit  to tele  as  per  protocol  Renal cardiology  agreed  with  VQ scan  resume  Home  Meds  GI DVT prophylaxis

## 2019-06-10 NOTE — H&P ADULT - PROBLEM SELECTOR PLAN 2
likely anemia 2/2 CKD, denies melena, brbpr, hematemesis  Hb 7.9, baseline hb 10  FOBT negative   will give venofer 100mg x1 during HD on Monday   f/u anemia panel   f/u repeat CBC

## 2019-06-10 NOTE — CONSULT NOTE ADULT - ASSESSMENT
64 yr old Male from home with PMHX of IgA nephropathy, ESRD on HD (MWF via LUE AVF at [facility],, last HD on Fri), HTN and CAD (S/P stents x2 12/2018) presents to the ED w/ shortness of breath that began earlier yesterday morning associated w/ mild chest pressure. pt also c/o substernal chest pain, consistent, pressure like pain, non radiate, 6-7/10, and left arm numbness.  1.D/C tele.  2.CAD-asa,b blocker,statin.  3.Obtain cath report from 12/18.  4.ESRD-HD as per renal.  5.Repeat Echocardiogram.  6.HTN-cont BP medication.  7.GI and DVT prophylaxis.

## 2019-06-10 NOTE — H&P ADULT - PROBLEM SELECTOR PLAN 6
pt on labetalol, Norvasc and hydralazine at home   bp boarder line   will reduce labetalol to 100mg bid with parameter, hold other bp meds  monitor bp closely

## 2019-06-11 LAB
ANION GAP SERPL CALC-SCNC: 12 MMOL/L — SIGNIFICANT CHANGE UP (ref 5–17)
BUN SERPL-MCNC: 53 MG/DL — HIGH (ref 7–18)
CALCIUM SERPL-MCNC: 8.2 MG/DL — LOW (ref 8.4–10.5)
CHLORIDE SERPL-SCNC: 99 MMOL/L — SIGNIFICANT CHANGE UP (ref 96–108)
CO2 SERPL-SCNC: 28 MMOL/L — SIGNIFICANT CHANGE UP (ref 22–31)
CREAT SERPL-MCNC: 8.91 MG/DL — HIGH (ref 0.5–1.3)
GLUCOSE SERPL-MCNC: 96 MG/DL — SIGNIFICANT CHANGE UP (ref 70–99)
HCT VFR BLD CALC: 26.1 % — LOW (ref 39–50)
HGB BLD-MCNC: 8.1 G/DL — LOW (ref 13–17)
MAGNESIUM SERPL-MCNC: 2.3 MG/DL — SIGNIFICANT CHANGE UP (ref 1.6–2.6)
MCHC RBC-ENTMCNC: 28.9 PG — SIGNIFICANT CHANGE UP (ref 27–34)
MCHC RBC-ENTMCNC: 31 GM/DL — LOW (ref 32–36)
MCV RBC AUTO: 93.2 FL — SIGNIFICANT CHANGE UP (ref 80–100)
NRBC # BLD: 0 /100 WBCS — SIGNIFICANT CHANGE UP (ref 0–0)
PHOSPHATE SERPL-MCNC: 5.2 MG/DL — HIGH (ref 2.5–4.5)
PLATELET # BLD AUTO: 258 K/UL — SIGNIFICANT CHANGE UP (ref 150–400)
POTASSIUM SERPL-MCNC: 4.7 MMOL/L — SIGNIFICANT CHANGE UP (ref 3.5–5.3)
POTASSIUM SERPL-SCNC: 4.7 MMOL/L — SIGNIFICANT CHANGE UP (ref 3.5–5.3)
RBC # BLD: 2.8 M/UL — LOW (ref 4.2–5.8)
RBC # FLD: 13.4 % — SIGNIFICANT CHANGE UP (ref 10.3–14.5)
SODIUM SERPL-SCNC: 139 MMOL/L — SIGNIFICANT CHANGE UP (ref 135–145)
WBC # BLD: 10.44 K/UL — SIGNIFICANT CHANGE UP (ref 3.8–10.5)
WBC # FLD AUTO: 10.44 K/UL — SIGNIFICANT CHANGE UP (ref 3.8–10.5)

## 2019-06-11 RX ORDER — IRON SUCROSE 20 MG/ML
100 INJECTION, SOLUTION INTRAVENOUS
Refills: 0 | Status: DISCONTINUED | OUTPATIENT
Start: 2019-06-11 | End: 2019-06-12

## 2019-06-11 RX ORDER — IRON SUCROSE 20 MG/ML
200 INJECTION, SOLUTION INTRAVENOUS ONCE
Refills: 0 | Status: COMPLETED | OUTPATIENT
Start: 2019-06-11 | End: 2019-06-11

## 2019-06-11 RX ADMIN — HEPARIN SODIUM 5000 UNIT(S): 5000 INJECTION INTRAVENOUS; SUBCUTANEOUS at 05:43

## 2019-06-11 RX ADMIN — HEPARIN SODIUM 5000 UNIT(S): 5000 INJECTION INTRAVENOUS; SUBCUTANEOUS at 17:09

## 2019-06-11 RX ADMIN — Medication 100 MILLIGRAM(S): at 17:10

## 2019-06-11 RX ADMIN — IRON SUCROSE 110 MILLIGRAM(S): 20 INJECTION, SOLUTION INTRAVENOUS at 13:32

## 2019-06-11 RX ADMIN — Medication 1334 MILLIGRAM(S): at 08:24

## 2019-06-11 RX ADMIN — Medication 1334 MILLIGRAM(S): at 17:09

## 2019-06-11 RX ADMIN — PANTOPRAZOLE SODIUM 40 MILLIGRAM(S): 20 TABLET, DELAYED RELEASE ORAL at 05:43

## 2019-06-11 RX ADMIN — ATORVASTATIN CALCIUM 40 MILLIGRAM(S): 80 TABLET, FILM COATED ORAL at 22:28

## 2019-06-11 RX ADMIN — Medication 1334 MILLIGRAM(S): at 11:57

## 2019-06-11 RX ADMIN — Medication 100 MILLIGRAM(S): at 05:43

## 2019-06-11 RX ADMIN — Medication 81 MILLIGRAM(S): at 11:57

## 2019-06-11 RX ADMIN — Medication 1 TABLET(S): at 11:56

## 2019-06-11 NOTE — PROGRESS NOTE ADULT - ASSESSMENT
64 yr old Male from home with PMHX of IgA nephropathy, ESRD on HD (MWF via LUE AVF at [facility],, last HD on Fri), HTN and CAD (S/P stents x2 12/2018) presents to the ED w/ shortness of breath that began earlier yesterday morning associated w/ mild chest pressure. pt also c/o substernal chest pain, consistent, pressure like pain, non radiate, 6-7/10, and left arm numbness.  1.Stress test in am.  2.CAD-asa,b blocker,statin.  3.Obtain cath report from 12/18.  4.ESRD-HD as per renal.  5.Repeat Echocardiogram.  6.HTN-cont BP medication.  7.GI and DVT prophylaxis.

## 2019-06-11 NOTE — PROGRESS NOTE ADULT - PROBLEM SELECTOR PLAN 1
VQ scan negative for PE.  Pt. denies SOB, O2 sats % on N/C 2L/Min  -Card Dr. Berger following  -f/u ECHO  -f/u stress test

## 2019-06-11 NOTE — PROGRESS NOTE ADULT - PROBLEM SELECTOR PLAN 2
Anemia likely 2/2 CKD and Iron defficiency.  pt. with no s&s of bleeding  -Hb slightly improved to 8.1 from 7.9, baseline hb 10  -FOBT negative   -Con Epogen  -Serum Iron, IBC and iron sat low  -Venofer 200mg x 1 today  -Will cont Venofer in HD-d/w Dr. Li  -f/u daily CBC  -Transfuse for Hgn <7.0    f/u anemia panel   f/u repeat CBC

## 2019-06-11 NOTE — PROGRESS NOTE ADULT - PROBLEM SELECTOR PLAN 4
ESRD on HD MWF via LUE AVF at [facility]  primary nephrologist DR. Esme Adams  -Nephro Dr. Li  -s/p HD Mon, next HD tomorrow 6/12

## 2019-06-11 NOTE — PROGRESS NOTE ADULT - SUBJECTIVE AND OBJECTIVE BOX
Problem List:  ESRD due to IgA nephropathy  Chest pain and SOB on admission.  Anemia       PAST MEDICAL & SURGICAL HISTORY:  MTHFR mutation  ESRD (end stage renal disease) on dialysis: Started Hemodialysis 8/2017 on Mon./ Wed/ Fri.  IgA nephropathy  High cholesterol  HTN (hypertension)      No Known Allergies      MEDICATIONS  (STANDING):  aspirin  chewable 81 milliGRAM(s) Oral daily  atorvastatin 40 milliGRAM(s) Oral at bedtime  calcium acetate 1334 milliGRAM(s) Oral three times a day with meals  chlorhexidine 4% Liquid 1 Application(s) Topical <User Schedule>  epoetin rachel Injectable 8000 Unit(s) IV Push <User Schedule>  heparin  Injectable 5000 Unit(s) SubCutaneous every 12 hours  labetalol 100 milliGRAM(s) Oral two times a day  multivitamin 1 Tablet(s) Oral daily  pantoprazole    Tablet 40 milliGRAM(s) Oral before breakfast    MEDICATIONS  (PRN):  acetaminophen   Tablet .. 650 milliGRAM(s) Oral every 6 hours PRN Temp greater or equal to 38C (100.4F)                            8.1    10.44 )-----------( 258      ( 11 Jun 2019 06:33 )             26.1     06-11    139  |  99  |  53<H>  ----------------------------<  96  4.7   |  28  |  8.91<H>    Ca    8.2<L>      11 Jun 2019 06:33  Phos  5.2     06-11  Mg     2.3     06-11    TPro  7.0  /  Alb  3.0<L>  /  TBili  0.6  /  DBili  x   /  AST  11  /  ALT  16  /  AlkPhos  72  06-09    PT/INR - ( 09 Jun 2019 22:03 )   PT: 12.0 sec;   INR: 1.08 ratio         PTT - ( 09 Jun 2019 22:03 )  PTT:28.7 sec    OBSERVATIONS:  Mitral Valve: Normal mitral valve. Trace mitral  regurgitation.  Aortic Root: Aortic Root: 3.4 cm.    Aortic Valve: Normal trileaflet aortic valve.  Left Atrium: Normal left atrium.  LA volume index = 27  cc/m2.  Left Ventricle: Normal Left Ventricular Systolic Function,  (EF = 50-55%) Mild concentric left ventricular hypertrophy.  Grade II diastolic dysfunction.  Right Heart: Normal right atrium. Normal right ventricular  size and systolic function (TAPSE  2.0cm). There is trace  tricuspid regurgitation. There is mild pulmonic  regurgitation.  Pericardium/PleuraSmall pericardial effusion.  Hemodynamic: Unable to estimate RVSP.  ------------------------------------------------------------------------  CONCLUSIONS:  1. Normal mitral valve. Trace mitral regurgitation.  2. Aortic Root: 3.4 cm.  3. Normal left atrium.  LA volume index = 27 cc/m2.  4. Mild concentric left ventricular hypertrophy.  5. Normal Left Ventricular Systolic Function,  (EF =  50-55%)  6. Grade II diastolic dysfunction.      REVIEW OF SYSTEMS:  General: no fever no chills, no weight loss.  chest pain and dyspnea improved after dialysis.  CARDIOVASCULAR: No chest pain or palpitations. No Edema  GASTROINTESTINAL: No abdominal or epigastric pain. No nausea, vomiting. No diarrhea or constipation. No melena.  GENITOURINARY: No dysuria, frequency, foamy urine, urinary urgency, incontinence or hematuria  NEUROLOGICAL: No numbness or weakness, no tremor , no dizziness.           VITALS:  T(F): 98.6 (06-11-19 @ 13:44), Max: 100.8 (06-10-19 @ 19:30)  HR: 103 (06-11-19 @ 17:10)  BP: 120/59 (06-11-19 @ 17:10)  RR: 18 (06-11-19 @ 13:44)  SpO2: 97% (06-11-19 @ 13:44)  Wt(kg): --      PHYSICAL EXAM:  Constitutional: well developed, no diaphoresis, no distress.  Neck: No JVD, no carotid bruit, supple, no adenopathy  Respiratory: Good air entrance B/L, no wheezes, rales or rhonchi  Cardiovascular: S1, S2, RRR, no pericardial rub, no murmur  Abdomen: BS+, soft, no tenderness, no bruit  Pelvis: bladder nondistended  Extremities: No cyanosis or clubbing. No peripheral edema.   Pulses: All present  Left AVF with bruit and thrill

## 2019-06-11 NOTE — PROGRESS NOTE ADULT - ASSESSMENT
ESRD  Chest pain in the last 23 weekends / sunday  Pleural and pericardial effusion - r/o uremia.  Anemia with low iron sat and negative stool for occult blood.    Increased Dialyzer and BFR during dialysis.  UF 2.5 kg in the next dialysis  Continue Epogen and supplement Iron with Venofer

## 2019-06-11 NOTE — PROGRESS NOTE ADULT - SUBJECTIVE AND OBJECTIVE BOX
NP Note discussed with  Primary Attending    Patient is a 64y old  Male who presents with a chief complaint of shortness of breath (10 Uziel 2019 12:52)      INTERVAL HPI/OVERNIGHT EVENTS: no new complaints    MEDICATIONS  (STANDING):  aspirin  chewable 81 milliGRAM(s) Oral daily  atorvastatin 40 milliGRAM(s) Oral at bedtime  calcium acetate 1334 milliGRAM(s) Oral three times a day with meals  chlorhexidine 4% Liquid 1 Application(s) Topical <User Schedule>  epoetin rachel Injectable 8000 Unit(s) IV Push <User Schedule>  heparin  Injectable 5000 Unit(s) SubCutaneous every 12 hours  labetalol 100 milliGRAM(s) Oral two times a day  multivitamin 1 Tablet(s) Oral daily  pantoprazole    Tablet 40 milliGRAM(s) Oral before breakfast    MEDICATIONS  (PRN):  acetaminophen   Tablet .. 650 milliGRAM(s) Oral every 6 hours PRN Temp greater or equal to 38C (100.4F)      __________________________________________________  REVIEW OF SYSTEMS:    CONSTITUTIONAL: No fever,   EYES: no acute visual disturbances  NECK: No pain or stiffness  RESPIRATORY: No cough; No shortness of breath  CARDIOVASCULAR: No chest pain, no palpitations  GASTROINTESTINAL: No pain. No nausea or vomiting; No diarrhea   NEUROLOGICAL: No headache or numbness, no tremors  MUSCULOSKELETAL: No joint pain, no muscle pain  GENITOURINARY: no dysuria, no frequency, no hesitancy  PSYCHIATRY: no depression , no anxiety  ALL OTHER  ROS negative        Vital Signs Last 24 Hrs  T(C): 36.9 (11 Jun 2019 05:10), Max: 38.2 (10 Uziel 2019 19:30)  T(F): 98.4 (11 Jun 2019 05:10), Max: 100.8 (10 Uziel 2019 19:30)  HR: 81 (11 Jun 2019 05:10) (81 - 113)  BP: 135/53 (11 Jun 2019 05:10) (124/74 - 156/84)  BP(mean): --  RR: 18 (11 Jun 2019 05:10) (15 - 18)  SpO2: 95% (11 Jun 2019 05:10) (95% - 100%)    ________________________________________________  PHYSICAL EXAM: well nourished, well groomed  GENERAL: NAD  HEENT: Normocephalic;  conjunctivae and sclerae clear; moist mucous membranes;   NECK : supple  CHEST/LUNG: Clear to auscultation bilaterally with good air entry   HEART: S1 S2  regular; no murmurs, gallops or rubs  ABDOMEN: Soft, Nontender, Nondistended; Bowel sounds present  EXTREMITIES: LUE AVF +bruit/thrill, no cyanosis; no edema; no calf tenderness  SKIN: warm and dry; no rash  NERVOUS SYSTEM:  Awake and alert; Oriented  to place, person and time ; no new deficits    _________________________________________________  LABS:                        8.1    10.44 )-----------( 258      ( 11 Jun 2019 06:33 )             26.1     06-11    139  |  99  |  53<H>  ----------------------------<  96  4.7   |  28  |  8.91<H>    Ca    8.2<L>      11 Jun 2019 06:33  Phos  5.2     06-11  Mg     2.3     06-11    TPro  7.0  /  Alb  3.0<L>  /  TBili  0.6  /  DBili  x   /  AST  11  /  ALT  16  /  AlkPhos  72  06-09    PT/INR - ( 09 Jun 2019 22:03 )   PT: 12.0 sec;   INR: 1.08 ratio         PTT - ( 09 Jun 2019 22:03 )  PTT:28.7 sec    CAPILLARY BLOOD GLUCOSE    RADIOLOGY & ADDITIONAL TESTS:      Xray Chest 2 Views PA/Lat (06.09.19 @ 23:29) >  EXAM:  XR CHEST PA LAT 2V                            PROCEDURE DATE:  06/09/2019          INTERPRETATION:  PA and lateral chest x-rays    Indication: Shortness of breath.    PA and lateral chest x-rays are compared to a previous examination dated   6/4/2019.    Impression: New mild left pleural effusion; underlying left basilar   pulmonary infiltrate cannot be excluded.    No evidence for pneumothorax.    The trachea is midline.    Stable enlargement of the cardiac silhouette.    New mild central pulmonary vascular congestion.    < end of copied text >        NM Pulmonary Ventilation/Perfusion Scan (06.10.19 @ 09:52) >  EXAM:  NM PULM VENTILATION PERFUS IMG                            PROCEDURE DATE:  06/10/2019          INTERPRETATION:  CLINICAL INFORMATION: 64 year-old male with shortness of   breath, elevated d-dimer and anemia, referred to evaluate for pulmonary   embolism.    RADIOPHARMACEUTICAL: 1 mCi Tc-99m-DTPA by inhalation; 6.1 mCi Tc-99m-MAA,   I.V.    TECHNIQUE:  Ventilation and perfusion images of the lungs were obtained   following administration of Tc-99m-DTPA and Tc-99m-MAA. Images were   obtained in the anterior, posterior, both lateral, and all 4 oblique   projections. This study was interpreted in conjunction with a chest   radiograph of 6/9/2019    COMPARISON: No previous lung V/Q scan for comparison     FINDINGS: There is heterogeneous radiotracer distribution in the lungs on   both the ventilation and the perfusion images. There are no segmental   perfusion defects.      IMPRESSION:  Very low probability of pulmonary embolus.    < end of copied text >      Imaging Personally Reviewed:  YES/NO    Consultant(s) Notes Reviewed:   YES/ No    Care Discussed with Consultants :     Plan of care was discussed with patient and /or primary care giver; all questions and concerns were addressed and care was aligned with patient's wishes.

## 2019-06-11 NOTE — PROGRESS NOTE ADULT - SUBJECTIVE AND OBJECTIVE BOX
CHIEF COMPLAINT:Patient is a 64y old  Male who presents with a chief complaint of shortness of breath.Pt appears comfortable.    	  REVIEW OF SYSTEMS:  CONSTITUTIONAL: No fever, weight loss, or fatigue  EYES: No eye pain, visual disturbances, or discharge  ENT:  No difficulty hearing, tinnitus, vertigo; No sinus or throat pain  NECK: No pain or stiffness  RESPIRATORY: No cough, wheezing, chills or hemoptysis; No Shortness of Breath  CARDIOVASCULAR: No chest pain, palpitations, passing out, dizziness, or leg swelling  GASTROINTESTINAL: No abdominal or epigastric pain. No nausea, vomiting, or hematemesis; No diarrhea or constipation. No melena or hematochezia.  GENITOURINARY: No dysuria, frequency, hematuria, or incontinence  NEUROLOGICAL: No headaches, memory loss, loss of strength, numbness, or tremors  SKIN: No itching, burning, rashes, or lesions   LYMPH Nodes: No enlarged glands  ENDOCRINE: No heat or cold intolerance; No hair loss  MUSCULOSKELETAL: No joint pain or swelling; No muscle, back, or extremity pain  PSYCHIATRIC: No depression, anxiety, mood swings, or difficulty sleeping  HEME/LYMPH: No easy bruising, or bleeding gums  ALLERGY AND IMMUNOLOGIC: No hives or eczema	    PHYSICAL EXAM:  T(C): 36.9 (06-11-19 @ 05:10), Max: 38.2 (06-10-19 @ 19:30)  HR: 81 (06-11-19 @ 05:10) (81 - 113)  BP: 135/53 (06-11-19 @ 05:10) (124/74 - 156/84)  RR: 18 (06-11-19 @ 05:10) (15 - 18)  SpO2: 95% (06-11-19 @ 05:10) (95% - 100%)  Wt(kg): --  I&O's Summary      Appearance: Normal	  HEENT:   Normal oral mucosa, PERRL, EOMI	  Lymphatic: No lymphadenopathy  Cardiovascular: Normal S1 S2, No JVD, No murmurs, No edema  Respiratory: Lungs clear to auscultation	  Psychiatry: A & O x 3, Mood & affect appropriate  Gastrointestinal:  Soft, Non-tender, + BS	  Skin: No rashes, No ecchymoses, No cyanosis	  Neurologic: Non-focal  Extremities: Normal range of motion, No clubbing, cyanosis or edema  Vascular: Peripheral pulses palpable 2+ bilaterally    MEDICATIONS  (STANDING):  aspirin  chewable 81 milliGRAM(s) Oral daily  atorvastatin 40 milliGRAM(s) Oral at bedtime  calcium acetate 1334 milliGRAM(s) Oral three times a day with meals  chlorhexidine 4% Liquid 1 Application(s) Topical <User Schedule>  epoetin rachel Injectable 8000 Unit(s) IV Push <User Schedule>  heparin  Injectable 5000 Unit(s) SubCutaneous every 12 hours  iron sucrose IVPB 200 milliGRAM(s) IV Intermittent once  labetalol 100 milliGRAM(s) Oral two times a day  multivitamin 1 Tablet(s) Oral daily  pantoprazole    Tablet 40 milliGRAM(s) Oral before breakfast      	  LABS:	 	    CARDIAC MARKERS:  CARDIAC MARKERS ( 10 Uziel 2019 12:15 )  <0.015 ng/mL / x     / 54 U/L / x     / <1.0 ng/mL  CARDIAC MARKERS ( 10 Uziel 2019 05:57 )  <0.015 ng/mL / x     / 56 U/L / x     / <1.0 ng/mL  CARDIAC MARKERS ( 09 Jun 2019 22:03 )  <0.015 ng/mL / x     / x     / x     / x                              8.1    10.44 )-----------( 258      ( 11 Jun 2019 06:33 )             26.1     06-11    139  |  99  |  53<H>  ----------------------------<  96  4.7   |  28  |  8.91<H>    Ca    8.2<L>      11 Jun 2019 06:33  Phos  5.2     06-11  Mg     2.3     06-11    TPro  7.0  /  Alb  3.0<L>  /  TBili  0.6  /  DBili  x   /  AST  11  /  ALT  16  /  AlkPhos  72  06-09    proBNP: Serum Pro-Brain Natriuretic Peptide: 2951 pg/mL (06-09 @ 22:03)      HgA1c: Hemoglobin A1C, Whole Blood: 5.4 % (06-10 @ 10:36)    TSH: Thyroid Stimulating Hormone, Serum: 0.85 uU/mL (06-10 @ 02:43)

## 2019-06-11 NOTE — PROGRESS NOTE ADULT - ASSESSMENT
65 yo M from home with PMH of IgA nephropathy, ESRD on HD (MWF via LUE AVF at [facility],, last HD on Fri), HTN and CAD (S/P stents x2 12/2018) presents to the ED w/ shortness of breath that began earlier yesterday morning associated w/ mild chest pressure. pt also c/o substernal chest pain, consistent, pressure like pain, non radiate, 6-7/10, and left arm numbness. Patient denies fever, chills, focal weakness, cough, swelling, melena, brbpr, hematemesis, abd pain, n/v/c/d or other complains . Patient notes his last hemodialysis was on Friday 06/07 and pt is anuria.     ED course, pt hemodynamic stable, labs noted wbc 11.9, Hb 7.9, FOBT negative, k 5.8, BUN/Cr  90/12.4, Troponin negative x1, EKG noted nsr, no changes compared to prior EKG.     Pt. underwent HD 6/10, admitted to medicine for Anemia associated with SOB/CP.  Pt. seen and examined, noted in NAD, reports feeling better today denies CP, SOB, palpitations, dizziness, noted with trace LE edema, saturating well on O2 N/C 2L.

## 2019-06-11 NOTE — PROGRESS NOTE ADULT - PROBLEM SELECTOR PLAN 3
Pt. with cardiac Hx. s/p coronary stents x 2 2018, s/p cath 01/19  -Card Dr. Berger  -Cardiac w/u so far neg  -f/u ECHO  -f/u stress test

## 2019-06-12 ENCOUNTER — TRANSCRIPTION ENCOUNTER (OUTPATIENT)
Age: 64
End: 2019-06-12

## 2019-06-12 VITALS
SYSTOLIC BLOOD PRESSURE: 143 MMHG | HEART RATE: 104 BPM | RESPIRATION RATE: 17 BRPM | OXYGEN SATURATION: 96 % | DIASTOLIC BLOOD PRESSURE: 77 MMHG | TEMPERATURE: 99 F

## 2019-06-12 LAB
ANION GAP SERPL CALC-SCNC: 12 MMOL/L — SIGNIFICANT CHANGE UP (ref 5–17)
BUN SERPL-MCNC: 85 MG/DL — HIGH (ref 7–18)
CALCIUM SERPL-MCNC: 8.1 MG/DL — LOW (ref 8.4–10.5)
CHLORIDE SERPL-SCNC: 100 MMOL/L — SIGNIFICANT CHANGE UP (ref 96–108)
CO2 SERPL-SCNC: 25 MMOL/L — SIGNIFICANT CHANGE UP (ref 22–31)
CREAT SERPL-MCNC: 12.1 MG/DL — HIGH (ref 0.5–1.3)
GLUCOSE SERPL-MCNC: 119 MG/DL — HIGH (ref 70–99)
HCT VFR BLD CALC: 23.9 % — LOW (ref 39–50)
HGB BLD-MCNC: 7.6 G/DL — LOW (ref 13–17)
MAGNESIUM SERPL-MCNC: 2.5 MG/DL — SIGNIFICANT CHANGE UP (ref 1.6–2.6)
MCHC RBC-ENTMCNC: 29.6 PG — SIGNIFICANT CHANGE UP (ref 27–34)
MCHC RBC-ENTMCNC: 31.8 GM/DL — LOW (ref 32–36)
MCV RBC AUTO: 93 FL — SIGNIFICANT CHANGE UP (ref 80–100)
NRBC # BLD: 0 /100 WBCS — SIGNIFICANT CHANGE UP (ref 0–0)
PHOSPHATE SERPL-MCNC: 4.1 MG/DL — SIGNIFICANT CHANGE UP (ref 2.5–4.5)
PLATELET # BLD AUTO: 285 K/UL — SIGNIFICANT CHANGE UP (ref 150–400)
POTASSIUM SERPL-MCNC: 4.8 MMOL/L — SIGNIFICANT CHANGE UP (ref 3.5–5.3)
POTASSIUM SERPL-SCNC: 4.8 MMOL/L — SIGNIFICANT CHANGE UP (ref 3.5–5.3)
RBC # BLD: 2.57 M/UL — LOW (ref 4.2–5.8)
RBC # FLD: 13.4 % — SIGNIFICANT CHANGE UP (ref 10.3–14.5)
SODIUM SERPL-SCNC: 137 MMOL/L — SIGNIFICANT CHANGE UP (ref 135–145)
WBC # BLD: 10.43 K/UL — SIGNIFICANT CHANGE UP (ref 3.8–10.5)
WBC # FLD AUTO: 10.43 K/UL — SIGNIFICANT CHANGE UP (ref 3.8–10.5)

## 2019-06-12 PROCEDURE — 83036 HEMOGLOBIN GLYCOSYLATED A1C: CPT

## 2019-06-12 PROCEDURE — 93005 ELECTROCARDIOGRAM TRACING: CPT

## 2019-06-12 PROCEDURE — 84443 ASSAY THYROID STIM HORMONE: CPT

## 2019-06-12 PROCEDURE — 82803 BLOOD GASES ANY COMBINATION: CPT

## 2019-06-12 PROCEDURE — 83540 ASSAY OF IRON: CPT

## 2019-06-12 PROCEDURE — 78452 HT MUSCLE IMAGE SPECT MULT: CPT

## 2019-06-12 PROCEDURE — 80048 BASIC METABOLIC PNL TOTAL CA: CPT

## 2019-06-12 PROCEDURE — 82607 VITAMIN B-12: CPT

## 2019-06-12 PROCEDURE — 71046 X-RAY EXAM CHEST 2 VIEWS: CPT

## 2019-06-12 PROCEDURE — 87040 BLOOD CULTURE FOR BACTERIA: CPT

## 2019-06-12 PROCEDURE — 83880 ASSAY OF NATRIURETIC PEPTIDE: CPT

## 2019-06-12 PROCEDURE — 85027 COMPLETE CBC AUTOMATED: CPT

## 2019-06-12 PROCEDURE — 36415 COLL VENOUS BLD VENIPUNCTURE: CPT

## 2019-06-12 PROCEDURE — 83735 ASSAY OF MAGNESIUM: CPT

## 2019-06-12 PROCEDURE — 99285 EMERGENCY DEPT VISIT HI MDM: CPT | Mod: 25

## 2019-06-12 PROCEDURE — A9502: CPT

## 2019-06-12 PROCEDURE — 99261: CPT

## 2019-06-12 PROCEDURE — 82272 OCCULT BLD FECES 1-3 TESTS: CPT

## 2019-06-12 PROCEDURE — 82746 ASSAY OF FOLIC ACID SERUM: CPT

## 2019-06-12 PROCEDURE — 86803 HEPATITIS C AB TEST: CPT

## 2019-06-12 PROCEDURE — 84484 ASSAY OF TROPONIN QUANT: CPT

## 2019-06-12 PROCEDURE — 93017 CV STRESS TEST TRACING ONLY: CPT

## 2019-06-12 PROCEDURE — 84100 ASSAY OF PHOSPHORUS: CPT

## 2019-06-12 PROCEDURE — 85379 FIBRIN DEGRADATION QUANT: CPT

## 2019-06-12 PROCEDURE — 85610 PROTHROMBIN TIME: CPT

## 2019-06-12 PROCEDURE — 82553 CREATINE MB FRACTION: CPT

## 2019-06-12 PROCEDURE — 78582 LUNG VENTILAT&PERFUS IMAGING: CPT

## 2019-06-12 PROCEDURE — 80053 COMPREHEN METABOLIC PANEL: CPT

## 2019-06-12 PROCEDURE — 93306 TTE W/DOPPLER COMPLETE: CPT

## 2019-06-12 PROCEDURE — 83550 IRON BINDING TEST: CPT

## 2019-06-12 PROCEDURE — 82728 ASSAY OF FERRITIN: CPT

## 2019-06-12 PROCEDURE — 82550 ASSAY OF CK (CPK): CPT

## 2019-06-12 PROCEDURE — 84145 PROCALCITONIN (PCT): CPT

## 2019-06-12 PROCEDURE — 85730 THROMBOPLASTIN TIME PARTIAL: CPT

## 2019-06-12 RX ORDER — ASPIRIN/CALCIUM CARB/MAGNESIUM 324 MG
1 TABLET ORAL
Qty: 30 | Refills: 0
Start: 2019-06-12 | End: 2019-07-11

## 2019-06-12 RX ORDER — ATORVASTATIN CALCIUM 80 MG/1
1 TABLET, FILM COATED ORAL
Qty: 30 | Refills: 0
Start: 2019-06-12 | End: 2019-07-11

## 2019-06-12 RX ORDER — CARVEDILOL PHOSPHATE 80 MG/1
3.12 CAPSULE, EXTENDED RELEASE ORAL EVERY 12 HOURS
Refills: 0 | Status: DISCONTINUED | OUTPATIENT
Start: 2019-06-12 | End: 2019-06-12

## 2019-06-12 RX ORDER — ISOSORBIDE DINITRATE 5 MG/1
1 TABLET ORAL
Qty: 90 | Refills: 0
Start: 2019-06-12 | End: 2019-07-11

## 2019-06-12 RX ORDER — HYDRALAZINE HCL 50 MG
10 TABLET ORAL THREE TIMES A DAY
Refills: 0 | Status: DISCONTINUED | OUTPATIENT
Start: 2019-06-12 | End: 2019-06-12

## 2019-06-12 RX ORDER — PANTOPRAZOLE SODIUM 20 MG/1
1 TABLET, DELAYED RELEASE ORAL
Qty: 30 | Refills: 0
Start: 2019-06-12 | End: 2019-07-11

## 2019-06-12 RX ORDER — HYDRALAZINE HCL 50 MG
1 TABLET ORAL
Qty: 90 | Refills: 0
Start: 2019-06-12 | End: 2019-07-11

## 2019-06-12 RX ORDER — LABETALOL HCL 100 MG
200 TABLET ORAL
Qty: 0 | Refills: 0 | DISCHARGE

## 2019-06-12 RX ORDER — HYDRALAZINE HCL 50 MG
1 TABLET ORAL
Qty: 0 | Refills: 0 | DISCHARGE

## 2019-06-12 RX ORDER — CARVEDILOL PHOSPHATE 80 MG/1
1 CAPSULE, EXTENDED RELEASE ORAL
Qty: 60 | Refills: 0
Start: 2019-06-12 | End: 2019-07-11

## 2019-06-12 RX ORDER — ISOSORBIDE DINITRATE 5 MG/1
10 TABLET ORAL THREE TIMES A DAY
Refills: 0 | Status: DISCONTINUED | OUTPATIENT
Start: 2019-06-12 | End: 2019-06-12

## 2019-06-12 RX ADMIN — CHLORHEXIDINE GLUCONATE 1 APPLICATION(S): 213 SOLUTION TOPICAL at 05:57

## 2019-06-12 RX ADMIN — Medication 1 TABLET(S): at 11:11

## 2019-06-12 RX ADMIN — IRON SUCROSE 210 MILLIGRAM(S): 20 INJECTION, SOLUTION INTRAVENOUS at 13:29

## 2019-06-12 RX ADMIN — PANTOPRAZOLE SODIUM 40 MILLIGRAM(S): 20 TABLET, DELAYED RELEASE ORAL at 05:57

## 2019-06-12 RX ADMIN — HEPARIN SODIUM 5000 UNIT(S): 5000 INJECTION INTRAVENOUS; SUBCUTANEOUS at 05:57

## 2019-06-12 RX ADMIN — Medication 1334 MILLIGRAM(S): at 11:12

## 2019-06-12 RX ADMIN — ERYTHROPOIETIN 8000 UNIT(S): 10000 INJECTION, SOLUTION INTRAVENOUS; SUBCUTANEOUS at 13:27

## 2019-06-12 RX ADMIN — Medication 10 MILLIGRAM(S): at 16:55

## 2019-06-12 RX ADMIN — ISOSORBIDE DINITRATE 10 MILLIGRAM(S): 5 TABLET ORAL at 16:55

## 2019-06-12 RX ADMIN — Medication 81 MILLIGRAM(S): at 11:11

## 2019-06-12 NOTE — PROGRESS NOTE ADULT - SUBJECTIVE AND OBJECTIVE BOX
Problem List:  ESRD due to IgA nephropathy  Chest pain and SOB on admission.  Anemia   Pleural and pericardia small effusion.      PAST MEDICAL & SURGICAL HISTORY:  MTHFR mutation  ESRD (end stage renal disease) on dialysis: Started Hemodialysis 8/2017 on Mon./ Wed/ Fri.  IgA nephropathy  High cholesterol  HTN (hypertension)  No significant past surgical history      No Known Allergies      MEDICATIONS  (STANDING):  aspirin  chewable 81 milliGRAM(s) Oral daily  atorvastatin 40 milliGRAM(s) Oral at bedtime  calcium acetate 1334 milliGRAM(s) Oral three times a day with meals  chlorhexidine 4% Liquid 1 Application(s) Topical <User Schedule>  epoetin rachel Injectable 8000 Unit(s) IV Push <User Schedule>  heparin  Injectable 5000 Unit(s) SubCutaneous every 12 hours  iron sucrose IVPB 100 milliGRAM(s) IV Intermittent <User Schedule>  labetalol 100 milliGRAM(s) Oral two times a day  multivitamin 1 Tablet(s) Oral daily  pantoprazole    Tablet 40 milliGRAM(s) Oral before breakfast    MEDICATIONS  (PRN):  acetaminophen   Tablet .. 650 milliGRAM(s) Oral every 6 hours PRN Temp greater or equal to 38C (100.4F)                            8.1    10.44 )-----------( 258      ( 11 Jun 2019 06:33 )             26.1     06-11    139  |  99  |  53<H>  ----------------------------<  96  4.7   |  28  |  8.91<H>    Ca    8.2<L>      11 Jun 2019 06:33  Phos  5.2     06-11  Mg     2.3     06-11        REVIEW OF SYSTEMS:  General: no fever no chills, no weight loss.  RESPIRATORY: No cough, wheezing, hemoptysis; No shortness of breath  CARDIOVASCULAR: No chest pain or palpitations. No Edema  GASTROINTESTINAL: No abdominal or epigastric pain. No nausea, vomiting. No diarrhea or constipation. No melena.  GENITOURINARY: No dysuria, frequency, foamy urine, urinary urgency, incontinence or hematuria  NEUROLOGICAL: No numbness or weakness, no tremor , no dizziness.   Muscle skeletal : no joint pain and no swelling of joints and limbs.        VITALS:  T(F): 98.4 (06-12-19 @ 05:03), Max: 99.9 (06-11-19 @ 21:17)  HR: 105 (06-12-19 @ 05:03)  BP: 103/80 (06-12-19 @ 05:03)  RR: 16 (06-12-19 @ 05:03)  SpO2: 94% (06-12-19 @ 05:03)  Wt(kg): --      PHYSICAL EXAM:  Constitutional: well developed, no diaphoresis, no distress.  Neck: No JVD, no carotid bruit, supple, no adenopathy  Respiratory: Good air entrance B/L, no wheezes, rales or rhonchi  Cardiovascular: S1, S2, RRR, no pericardial rub, no murmur  Abdomen: BS+, soft, no tenderness, no bruit  Pelvis: bladder nondistended  Extremities: No cyanosis or clubbing. No peripheral edema.   Pulses: All present  Neurological: A/O x 3, no focal deficits    Vascular Access: left AVF with bruti and thrill

## 2019-06-12 NOTE — PROGRESS NOTE ADULT - ASSESSMENT
64 yr old Male from home with PMHX of IgA nephropathy, ESRD on HD (MWF via LUE AVF at [facility],, last HD on Fri), HTN and CAD (S/P stents x2 12/2018) presents to the ED w/ shortness of breath that began earlier yesterday morning associated w/ mild chest pressure. pt also c/o substernal chest pain, consistent, pressure like pain, non radiate, 6-7/10, and left arm numbness.  1.Stress test -R/O Ischemia.  2.CAD-asa,b blocker,statin.  3.ESRD-HD as per renal.  4.HTN-cont BP medication.  5.GI and DVT prophylaxis.

## 2019-06-12 NOTE — PROGRESS NOTE ADULT - ATTENDING COMMENTS
Above  noted  65 yo M from home with PMH of IgA nephropathy, ESRD on HD (MWF via LUE AVF at [facility],, last HD on Fri), HTN and CAD (S/P stents x2 12/2018) presents to the ED w/ shortness of breath that began earlier yesterday morning associated w/ mild chest pressure. pt also c/o substernal chest pain, consistent, pressure like pain, non radiate, 6-7/10, and left arm numbness. Patient denies fever, chills, focal weakness, cough, swelling, melena, brbpr, hematemesis, abd pain, n/v/c/d or other complains . Patient notes his last hemodialysis was on Friday 06/07 and pt is anuria.     ED course, pt hemodynamic stable, labs noted wbc 11.9, Hb 7.9, FOBT negative, k 5.8, BUN/Cr  90/12.4, Troponin negative x1, EKG noted nsr, no changes compared to prior EKG.   Blood test radiology reports reviewed    Impression  Symptomatic  Anemia  currently  symptoms  improved    SOB Palpitations chest Pain negative  stress  test  Diastolic CHF    HTN with  CKD stage  5    CAD with stents  obtain stress tests    ESRD on HD  Ig A nephropathy   agreed to discharge  home  follow  up by PMD and  renal transplant  team
Above  noted  63 yo M from home with PMH of IgA nephropathy, ESRD on HD (MWF via LUE AVF at [facility],, last HD on Fri), HTN and CAD (S/P stents x2 12/2018) presents to the ED w/ shortness of breath that began earlier yesterday morning associated w/ mild chest pressure. pt also c/o substernal chest pain, consistent, pressure like pain, non radiate, 6-7/10, and left arm numbness. Patient denies fever, chills, focal weakness, cough, swelling, melena, brbpr, hematemesis, abd pain, n/v/c/d or other complains . Patient notes his last hemodialysis was on Friday 06/07 and pt is anuria.     ED course, pt hemodynamic stable, labs noted wbc 11.9, Hb 7.9, FOBT negative, k 5.8, BUN/Cr  90/12.4, Troponin negative x1, EKG noted nsr, no changes compared to prior EKG.   Blood test radiology reports reviewed    Impression  Symptomatic  Anemia    SOB Palpitations chest Pain  PE ruled out  follow  ECHO  HTN with  CKD stage  5    CAD with stents  obtain stress tests    ESRD on HD  Ig A nephropathy   l  Renal cardiology  evaluation reviewed   resume  Home  Meds  GI DVT prophylaxis

## 2019-06-12 NOTE — PROGRESS NOTE ADULT - ASSESSMENT
ESRD , dialysis today, 3.5 hours , increased dialyzer and fluid remova    Chest pain awir stress test to r/i cardiac ischemia  Possible Uremia with pleura/pericardial effusion, will follow with dialysis today.    Anemia started Epogen and Venofer.    Increased HR in am to follow with cardiology, on Labetalol 100 mg BID

## 2019-06-12 NOTE — PROGRESS NOTE ADULT - SUBJECTIVE AND OBJECTIVE BOX
CHIEF COMPLAINT:Patient is a 64y old  Male who presents with a chief complaint of shortness of breath.Pt appears comfortable.    	  REVIEW OF SYSTEMS:  CONSTITUTIONAL: No fever, weight loss, or fatigue  EYES: No eye pain, visual disturbances, or discharge  ENT:  No difficulty hearing, tinnitus, vertigo; No sinus or throat pain  NECK: No pain or stiffness  RESPIRATORY: No cough, wheezing, chills or hemoptysis; No Shortness of Breath  CARDIOVASCULAR: No chest pain, palpitations, passing out, dizziness, or leg swelling  GASTROINTESTINAL: No abdominal or epigastric pain. No nausea, vomiting, or hematemesis; No diarrhea or constipation. No melena or hematochezia.  GENITOURINARY: No dysuria, frequency, hematuria, or incontinence  NEUROLOGICAL: No headaches, memory loss, loss of strength, numbness, or tremors  SKIN: No itching, burning, rashes, or lesions   LYMPH Nodes: No enlarged glands  ENDOCRINE: No heat or cold intolerance; No hair loss  MUSCULOSKELETAL: No joint pain or swelling; No muscle, back, or extremity pain  PSYCHIATRIC: No depression, anxiety, mood swings, or difficulty sleeping  HEME/LYMPH: No easy bruising, or bleeding gums  ALLERGY AND IMMUNOLOGIC: No hives or eczema	      PHYSICAL EXAM:  T(C): 36.9 (06-12-19 @ 05:03), Max: 37.7 (06-11-19 @ 21:17)  HR: 105 (06-12-19 @ 05:03) (101 - 105)  BP: 103/80 (06-12-19 @ 05:03) (103/80 - 158/80)  RR: 16 (06-12-19 @ 05:03) (16 - 18)  SpO2: 94% (06-12-19 @ 05:03) (94% - 97%)      Appearance: Normal	  HEENT:   Normal oral mucosa, PERRL, EOMI	  Lymphatic: No lymphadenopathy  Cardiovascular: Normal S1 S2, No JVD, No murmurs, No edema  Respiratory: Lungs clear to auscultation	  Psychiatry: A & O x 3, Mood & affect appropriate  Gastrointestinal:  Soft, Non-tender, + BS	  Skin: No rashes, No ecchymoses, No cyanosis	  Neurologic: Non-focal  Extremities: Normal range of motion, No clubbing, cyanosis or edema  Vascular: Peripheral pulses palpable 2+ bilaterally    MEDICATIONS  (STANDING):  aspirin  chewable 81 milliGRAM(s) Oral daily  atorvastatin 40 milliGRAM(s) Oral at bedtime  calcium acetate 1334 milliGRAM(s) Oral three times a day with meals  chlorhexidine 4% Liquid 1 Application(s) Topical <User Schedule>  epoetin rachel Injectable 8000 Unit(s) IV Push <User Schedule>  heparin  Injectable 5000 Unit(s) SubCutaneous every 12 hours  iron sucrose IVPB 100 milliGRAM(s) IV Intermittent <User Schedule>  labetalol 100 milliGRAM(s) Oral two times a day  multivitamin 1 Tablet(s) Oral daily  pantoprazole    Tablet 40 milliGRAM(s) Oral before breakfast        	  LABS:	 	    CARDIAC MARKERS ( 10 Uziel 2019 12:15 )  <0.015 ng/mL / x     / 54 U/L / x     / <1.0 ng/mL                         8.1    10.44 )-----------( 258      ( 11 Jun 2019 06:33 )             26.1     06-11    139  |  99  |  53<H>  ----------------------------<  96  4.7   |  28  |  8.91<H>    Ca    8.2<L>      11 Jun 2019 06:33  Phos  5.2     06-11  Mg     2.3     06-11      proBNP: Serum Pro-Brain Natriuretic Peptide: 2951 pg/mL (06-09 @ 22:03)    HgA1c: Hemoglobin A1C, Whole Blood: 5.4 % (06-10 @ 10:36)    TSH: Thyroid Stimulating Hormone, Serum: 0.85 uU/mL (06-10 @ 02:43)      	  OBSERVATIONS:  Mitral Valve: Normal mitral valve. Trace mitral  regurgitation.  Aortic Root: Aortic Root: 3.4 cm.  Aortic Valve: Normal trileaflet aortic valve.  Left Atrium: Normal left atrium.  LA volume index = 27  cc/m2.  Left Ventricle: Normal Left Ventricular Systolic Function,  (EF = 50-55%) Mild concentric left ventricular hypertrophy.  Grade II diastolic dysfunction.  Right Heart: Normal right atrium. Normal right ventricular  size and systolic function (TAPSE  2.0cm). There is trace  tricuspid regurgitation. There is mild pulmonic  regurgitation.  Pericardium/PleuraSmall pericardial effusion.  Hemodynamic: Unable to estimate RVSP.

## 2019-06-12 NOTE — DISCHARGE NOTE PROVIDER - HOSPITAL COURSE
65 yo M from home with PMH of IgA nephropathy, ESRD on HD (MWF via LUE AVF at [facility],, last HD on Fri), HTN and CAD (S/P stents x2 12/2018) presents to the ED w/ shortness of breath that began earlier yesterday morning associated w/ mild chest pressure. pt also c/o substernal chest pain, consistent, pressure like pain, non radiate, 6-7/10, and left arm numbness. Patient denies fever, chills, focal weakness, cough, swelling, melena, brbpr, hematemesis, abd pain, n/v/c/d or other complains . Patient notes his last hemodialysis was on Friday 06/07 and pt is anuria.         ED course, pt hemodynamic stable, labs noted wbc 11.9, Hb 7.9, FOBT negative, k 5.8, BUN/Cr  90/12.4, Troponin negative x1, EKG noted nsr, no changes compared to prior EKG.     GOC: pt is full code        CXR 6/9/19->New mild left pleural effusion; underlying left basilar pulmonary infiltrate cannot be excluded. No evidence for pneumothorax.    The trachea is midline. Stable enlargement of the cardiac silhouette. New mild central pulmonary vascular congestion.        Pt. underwent HD and admitted to medicine, noted with improved overall condition with decreased SOB and CP.      Pt. admitted with anemia, 7.8/24.7, likely due to CKD and iron deficiency.  Epogen and iron supplements in progress, pt. remains with asymptomatic anemia 7.6/23.9, with no s&s of GI or other bleed.  Blood transfusion not indicated at this time, pt. to f/u with kidney transplant team.    Pt. followed by emelina Berger, underwent ECHO which revealed LVSF 50-55%, GIIDD, mild LVH and small pericardial effusion.    Pt. also underwent pharm nuclear stress test which was negative for ischemia however with global hypokinesis without regional wall motion abnormalities, EF 44%.  Pt.'s cardiac meds adjusted by card, will discharge pt. on new regimen.    Discussed with attending, pt. is medically stable for discharge to home.  He will follow up with transplant team.

## 2019-06-12 NOTE — DISCHARGE NOTE NURSING/CASE MANAGEMENT/SOCIAL WORK - NSDCDPATPORTLINK_GEN_ALL_CORE
You can access the [facility] Patient Portal, offered by [facility], by registering with the following website: [email or web address]

## 2019-06-12 NOTE — DISCHARGE NOTE PROVIDER - NSDCCPCAREPLAN_GEN_ALL_CORE_FT
PRINCIPAL DISCHARGE DIAGNOSIS  Diagnosis: Anemia  Assessment and Plan of Treatment: You were admitted with anemia, likely due to your kidney disease and iron defficiency, with low blood count of 7.6.  You receive Epogen in dialysis and iron supplements for treatment of your anemia, no blood transfusion was indicated at this time.  -Please follow up with dialysis and with transplant team  -      SECONDARY DISCHARGE DIAGNOSES  Diagnosis: Shortness of breath  Assessment and Plan of Treatment: You presented with shortness of Breath likely due to fluid overload and anemia.  Your shortness of breath improved post dialysis.  -Cont dialysis 3x/week  -Take medications as prescribed    Diagnosis: Chest pain, unspecified type  Assessment and Plan of Treatment: You presented with chest pain associated with shortness of breath.  You were followed by cardiologist Dr. Berger.  Your cardiac work up was negative for ischemia.  Your stress test showed EF of 44%.    -Take medications as prescribed  -Follow up with cardiologist and PCP    Diagnosis: HTN (hypertension)  Assessment and Plan of Treatment: Take blood pressure medications as prescribed.    Diagnosis: ESRD (end stage renal disease) on dialysis  Assessment and Plan of Treatment: Started Hemodialysis 8/2017 on Mon./ Wed/ Fri.  -Cont hemodialysis 3x/week  -Follow up with renal transplant team  -Follow renal diet

## 2019-06-12 NOTE — DISCHARGE NOTE PROVIDER - CARE PROVIDER_API CALL
Esme Adams)  Internal Medicine; Nephrology  [address], [address]  [place] [address]  Phone: [phone]  Fax: [phone]  Follow Up Time: 1 week

## 2019-06-12 NOTE — PROGRESS NOTE ADULT - SUBJECTIVE AND OBJECTIVE BOX
Patient is a 64y old  Male who presents with a chief complaint of shortness of breath (12 Jun 2019 13:51)      INTERVAL HPI/OVERNIGHT EVENTS:Patient  is  feeling better  had  negative  stress  tests  underwent  HD  noted with decrease  H/H    T(C): 36.8 (06-12-19 @ 12:50), Max: 37.7 (06-11-19 @ 21:17)  HR: 96 (06-12-19 @ 12:50) (96 - 105)  BP: 137/95 (06-12-19 @ 12:50) (103/80 - 158/80)  RR: 17 (06-12-19 @ 12:50) (16 - 17)  SpO2: 95% (06-12-19 @ 12:50) (94% - 95%)  Wt(kg): --Vital Signs Last 24 Hrs  T(C): 36.8 (12 Jun 2019 12:50), Max: 37.7 (11 Jun 2019 21:17)  T(F): 98.3 (12 Jun 2019 12:50), Max: 99.9 (11 Jun 2019 21:17)  HR: 96 (12 Jun 2019 12:50) (96 - 105)  BP: 137/95 (12 Jun 2019 12:50) (103/80 - 158/80)  BP(mean): --  RR: 17 (12 Jun 2019 12:50) (16 - 17)  SpO2: 95% (12 Jun 2019 12:50) (94% - 95%)  I&O's Summary      LABS:                        7.6    10.43 )-----------( 285      ( 12 Jun 2019 12:58 )             23.9     06-12    137  |  100  |  85<H>  ----------------------------<  119<H>  4.8   |  25  |  12.10<H>    Ca    8.1<L>      12 Jun 2019 12:58  Phos  4.1     06-12  Mg     2.5     06-12          CAPILLARY BLOOD GLUCOSE    MEDICATIONS  (STANDING):  aspirin  chewable 81 milliGRAM(s) Oral daily  atorvastatin 40 milliGRAM(s) Oral at bedtime  calcium acetate 1334 milliGRAM(s) Oral three times a day with meals  carvedilol 3.125 milliGRAM(s) Oral every 12 hours  chlorhexidine 4% Liquid 1 Application(s) Topical <User Schedule>  epoetin rachel Injectable 8000 Unit(s) IV Push <User Schedule>  heparin  Injectable 5000 Unit(s) SubCutaneous every 12 hours  hydrALAZINE 10 milliGRAM(s) Oral three times a day  iron sucrose IVPB 100 milliGRAM(s) IV Intermittent <User Schedule>  isosorbide   dinitrate Tablet (ISORDIL) 10 milliGRAM(s) Oral three times a day  multivitamin 1 Tablet(s) Oral daily  pantoprazole    Tablet 40 milliGRAM(s) Oral before breakfast    MEDICATIONS  (PRN):  acetaminophen   Tablet .. 650 milliGRAM(s) Oral every 6 hours PRN Temp greater or equal to 38C (100.4F)            REVIEW OF SYSTEMS:  CONSTITUTIONAL: No fever, weight loss, or fatigue  EYES: No eye pain, visual disturbances, or discharge  ENMT:  No difficulty hearing, tinnitus, vertigo; No sinus or throat pain  NECK: No pain or stiffness  BREASTS: No pain, masses, or nipple discharge  RESPIRATORY: No cough, wheezing, chills or hemoptysis; No shortness of breath  CARDIOVASCULAR: No chest pain, palpitations, dizziness, or leg swelling  GASTROINTESTINAL: No abdominal or epigastric pain. No nausea, vomiting, or hematemesis; No diarrhea or constipation. No melena or hematochezia.  GENITOURINARY: No dysuria, frequency, hematuria, or incontinence  NEUROLOGICAL: No headaches, memory loss, loss of strength, numbness, or tremors  SKIN: No itching, burning, rashes, or lesions   LYMPH NODES: No enlarged glands  ENDOCRINE: No heat or cold intolerance; No hair loss  MUSCULOSKELETAL: No joint pain or swelling; No muscle, back, or extremity pain  PSYCHIATRIC: No depression, anxiety, mood swings, or difficulty sleeping  HEME/LYMPH: No easy bruising, or bleeding gums  ALLERGY AND IMMUNOLOGIC: No hives or eczema    RADIOLOGY & ADDITIONAL TESTS:    Imaging Personally Reviewed:  [ ] YES  [ ] NO    Consultant(s) Notes Reviewed:  [x ] YES  [ ] NO    PHYSICAL EXAM:  GENERAL: NAD, well-groomed, well-developed  HEAD:  Atraumatic, Normocephalic  EYES: EOMI, PERRLA, conjunctiva and sclera clear  ENMT: No tonsillar erythema, exudates, or enlargement; Moist mucous membranes, Good dentition, No lesions  NECK: Supple, No JVD, Normal thyroid  NERVOUS SYSTEM:  Alert & Oriented X3, Good concentration; Motor Strength 5/5 B/L upper and lower extremities; DTRs 2+ intact and symmetric  CHEST/LUNG: Clear to percussion bilaterally; No rales, rhonchi, wheezing, or rubs  HEART: Regular rate and rhythm; No murmurs, rubs, or gallops  ABDOMEN: Soft, Nontender, Nondistended; Bowel sounds present  EXTREMITIES:  2+ Peripheral Pulses, No clubbing, cyanosis, or edema  LYMPH: No lymphadenopathy noted  SKIN: No rashes or lesions    Care Discussed with Consultants/Other Providers [ x] YES  [ ] NO

## 2019-06-12 NOTE — PROGRESS NOTE ADULT - NSHPATTENDINGPLANDISCUSS_GEN_ALL_CORE
renal medical team patient  and  family
THE PATIENT AND MEDICAL TEAM
the patient an dmedical team
renal medical teampatient  and  family

## 2019-06-16 LAB
CULTURE RESULTS: SIGNIFICANT CHANGE UP
CULTURE RESULTS: SIGNIFICANT CHANGE UP
SPECIMEN SOURCE: SIGNIFICANT CHANGE UP
SPECIMEN SOURCE: SIGNIFICANT CHANGE UP

## 2019-07-01 ENCOUNTER — OUTPATIENT (OUTPATIENT)
Dept: OUTPATIENT SERVICES | Facility: HOSPITAL | Age: 64
LOS: 1 days | End: 2019-07-01
Payer: MEDICARE

## 2019-07-01 PROCEDURE — G9001: CPT

## 2019-07-08 DIAGNOSIS — Z71.89 OTHER SPECIFIED COUNSELING: ICD-10-CM

## 2019-07-11 ENCOUNTER — APPOINTMENT (OUTPATIENT)
Dept: ENDOVASCULAR SURGERY | Facility: CLINIC | Age: 64
End: 2019-07-11
Payer: MEDICARE

## 2019-07-11 VITALS
OXYGEN SATURATION: 100 % | BODY MASS INDEX: 27.32 KG/M2 | SYSTOLIC BLOOD PRESSURE: 143 MMHG | TEMPERATURE: 97.8 F | DIASTOLIC BLOOD PRESSURE: 78 MMHG | HEIGHT: 66 IN | WEIGHT: 170 LBS | RESPIRATION RATE: 15 BRPM | HEART RATE: 93 BPM

## 2019-07-11 PROCEDURE — 36902Z: CUSTOM

## 2019-07-11 NOTE — ASSESSMENT
[FreeTextEntry1] : Fistulogram for high pressures demonstrates moderate to severe stenoses within body of fistula PTA to 7 mm with good angiographic and clinical result.  EBL = 5 cc.  Full report to follow.

## 2019-07-11 NOTE — PROCEDURE
[Resume diet] : resume diet [Site check for bleeding/hematoma/thrill/bruit] : Site check for bleeding/hematoma/thrill/bruit [Vital signs on admission the q 15 mins x2] : Vital signs on admission the q 15 mins x2 [FreeTextEntry1] : left arm fistula/fistulogram/angioplasty

## 2019-07-11 NOTE — HISTORY OF PRESENT ILLNESS
[] : left radiocephalic fistula [FreeTextEntry1] : 10/9/217 Dr. Senior [FreeTextEntry4] : yesterday  [FreeTextEntry5] : yesterday at 10 pm [FreeTextEntry6] : Dr Daugherty

## 2019-07-30 ENCOUNTER — APPOINTMENT (OUTPATIENT)
Dept: ENDOVASCULAR SURGERY | Facility: CLINIC | Age: 64
End: 2019-07-30

## 2019-08-06 NOTE — H&P ADULT - NSHPRISKHIVSCREEN_GEN_ALL_CORE
Patient has had this issue for about 3 weeks now. We checked a urine on her and it was okay. She reports that the pain started on the right side, but now has it across the whole lower back with the sides being the worse. States that the pain is sharp when it happens and rates it at an 8 on 0-10 scale. Reports that it can happen when she's standing but happens mostly when she sits and pressure is on the lower back. Complains that at times it will radiate through to the abdomen. She denies any decreased extremity sensation or bladder/bowel issues. She denies any injury or fevers. She hasn't been evaluated for this, but would like to be. Can you accommodate?    Offered and patient declined

## 2019-09-02 PROBLEM — Z09 FOLLOW UP: Status: ACTIVE | Noted: 2018-06-04

## 2019-10-21 ENCOUNTER — APPOINTMENT (OUTPATIENT)
Dept: VASCULAR SURGERY | Facility: CLINIC | Age: 64
End: 2019-10-21

## 2019-11-11 ENCOUNTER — APPOINTMENT (OUTPATIENT)
Dept: VASCULAR SURGERY | Facility: CLINIC | Age: 64
End: 2019-11-11
Payer: MEDICARE

## 2019-11-11 PROCEDURE — 93990 DOPPLER FLOW TESTING: CPT

## 2019-11-11 PROCEDURE — 99213 OFFICE O/P EST LOW 20 MIN: CPT

## 2019-11-11 NOTE — REASON FOR VISIT
[Follow-Up Visit] : a follow-up visit for [Inadequate Flow within AVF] : inadequate flow within AVF [FreeTextEntry2] : Stenosis of AV fistula

## 2019-11-11 NOTE — ASSESSMENT
[FreeTextEntry1] : Renal failure on hemodialysis with severe stenosis of mid portion of the left arm AV fistula. Plan for fistulogram and angioplasty.

## 2019-11-11 NOTE — PHYSICAL EXAM
[Pulsatile Thrill] : pulsatile thrill [Aneurysm] : aneurysm [Hand well perfused] : hand well perfused [Bleeding] : no bleeding [2+] : right 2+ [Normal] : coordination grossly intact, no focal deficits

## 2019-12-17 ENCOUNTER — FORM ENCOUNTER (OUTPATIENT)
Age: 64
End: 2019-12-17

## 2019-12-17 NOTE — REASON FOR VISIT
[Other ___] : a [unfilled] visit for [Other: ___] : [unfilled] [High Arterial/Negative Pressure] : high arterial/negative pressure

## 2019-12-18 ENCOUNTER — APPOINTMENT (OUTPATIENT)
Dept: ENDOVASCULAR SURGERY | Facility: CLINIC | Age: 64
End: 2019-12-18
Payer: MEDICARE

## 2019-12-18 VITALS
OXYGEN SATURATION: 98 % | SYSTOLIC BLOOD PRESSURE: 155 MMHG | HEART RATE: 80 BPM | RESPIRATION RATE: 15 BRPM | DIASTOLIC BLOOD PRESSURE: 80 MMHG | HEIGHT: 66 IN | TEMPERATURE: 98.3 F | WEIGHT: 170 LBS | BODY MASS INDEX: 27.32 KG/M2

## 2019-12-18 DIAGNOSIS — N18.6 END STAGE RENAL DISEASE: ICD-10-CM

## 2019-12-18 DIAGNOSIS — T82.898A OTHER SPECIFIED COMPLICATION OF VASCULAR PROSTHETIC DEVICES, IMPLANTS AND GRAFTS, INITIAL ENCOUNTER: ICD-10-CM

## 2019-12-18 PROCEDURE — 36902Z: CUSTOM

## 2019-12-18 PROCEDURE — 36215Z: CUSTOM | Mod: 59

## 2019-12-20 NOTE — PROCEDURE
[Resume diet] : resume diet [Vital signs on admission the q 15 mins x2] : Vital signs on admission the q 15 mins x2 [Site check for bleeding/hematoma/thrill/bruit] : Site check for bleeding/hematoma/thrill/bruit [FreeTextEntry1] : left arm fistula/fistulogram/angioplasty

## 2019-12-20 NOTE — PAST MEDICAL HISTORY
[No therapy indicated for cases scheduled for less than one hour] : No therapy indicated for cases scheduled for less than one hour. [Increasing age ( >40 years old)] : Increasing age ( >40 years old) [FreeTextEntry1] : Malignant Hyperthermia Screening Tool and Risk of Bleeding Assessment\par \par Mr. REYNA ABBOTT denies family history of unexpected death following Anesthesia or Exercise.\par Denies Family history of Malignant Hyperthermia, Muscle or Neuromuscular disorder and High Temperature following exercise.\par \par Mr. REYNA ABBOTT denies history of Muscle Spasm, Dark or Chocolate - Colored urine and Unanticipated fever immediately following anesthesia or serious exercise. \par Mr. ABBOTT also denies bleeding tendencies/ Risks of Bleeding.\par

## 2020-02-28 ENCOUNTER — APPOINTMENT (OUTPATIENT)
Dept: VASCULAR SURGERY | Facility: CLINIC | Age: 65
End: 2020-02-28
Payer: MEDICARE

## 2020-02-28 PROCEDURE — 93990 DOPPLER FLOW TESTING: CPT

## 2020-03-20 ENCOUNTER — EMERGENCY (EMERGENCY)
Facility: HOSPITAL | Age: 65
LOS: 1 days | Discharge: HOME | End: 2020-03-20
Attending: EMERGENCY MEDICINE
Payer: MEDICARE

## 2020-03-20 VITALS
OXYGEN SATURATION: 97 % | RESPIRATION RATE: 16 BRPM | DIASTOLIC BLOOD PRESSURE: 74 MMHG | TEMPERATURE: 100 F | SYSTOLIC BLOOD PRESSURE: 113 MMHG | HEIGHT: 67 IN | HEART RATE: 90 BPM | WEIGHT: 179.9 LBS

## 2020-03-20 PROCEDURE — U0001: CPT

## 2020-03-20 PROCEDURE — 99283 EMERGENCY DEPT VISIT LOW MDM: CPT

## 2020-03-20 NOTE — ED PROVIDER NOTE - PATIENT PORTAL LINK FT
You can access the FollowMyHealth Patient Portal offered by [facility] by registering at the following website: [email or web address]. By joining [facility]’s FollowMyHealth portal, you will also be able to view your health information using other applications (apps) compatible with our system.

## 2020-03-20 NOTE — ED PROVIDER NOTE - OBJECTIVE STATEMENT
65 year old male patient with PMHx of ESRD (on dialysis), high cholesterol, hypertension, IgA nephropathy, and MTHFR mutation presents to ED for testing for corona virus.  Patient presents with flu like symptoms for a few days, including low grade fevers around 100.0. Patient states that he has had positive exposure to COVID-19. Patient is requesting that testing be done. NKDA.

## 2020-03-20 NOTE — ED ADULT NURSE NOTE - NSFALLRSKPASTHIST_ED_ALL_ED
[FreeTextEntry1] : Labs\par 7/7/17\par TSH 2.29\par CBC normal\par 25 vitamin D 18.7\par Hba1c 5.5%\par chol 249  HDL 88 tri 62\par BUN/cr 8/0.74\par calcium 9.8\par \par 8/16/18\par BUn/cr 7/0.91\par calcium 9.8\par TSH 1.14\par  chol 240 tri 143 HDL 76\par 25 vitamin D 20.4\par \par  no

## 2020-03-20 NOTE — ED PROVIDER NOTE - ATTENDING CONTRIBUTION TO CARE
I, Denys Lara, performed the initial face to face bedside interview with this patient regarding history of present illness, review of symptoms and relevant past medical, social and family history.  I completed an independent physical examination.  I was the initial provider who evaluated this patient. I have signed out the follow up of any pending tests (i.e. labs, radiological studies) to the ACP.  I have communicated the patient’s plan of care and disposition with the ACP.  The history, relevant review of systems, past medical and surgical history, medical decision making, and physical examination was documented by the scribe in my presence and I attest to the accuracy of the documentation.

## 2020-03-20 NOTE — ED PROVIDER NOTE - CLINICAL SUMMARY MEDICAL DECISION MAKING FREE TEXT BOX
65 year old male patient presents for symptoms of coronavirus.  Patient not tachypneic or hypoxic.  Will test, return precautions provided, will advise to self isolate.

## 2020-03-20 NOTE — ED ADULT NURSE NOTE - OBJECTIVE STATEMENT
pt is here for fever.  pt stated that ESRD dialyzed today contact with positive COVID 19 with fever, denied pain or sob, denied N/V/D, no distress noted at this time.

## 2020-03-20 NOTE — ED ADULT NURSE NOTE - NSIMPLEMENTINTERV_GEN_ALL_ED
Implemented All Universal Safety Interventions:  [place] to call system. Call bell, personal items and telephone within reach. Instruct patient to call for assistance. Room bathroom lighting operational. Non-slip footwear when patient is off stretcher. Physically safe environment: no spills, clutter or unnecessary equipment. Stretcher in lowest position, wheels locked, appropriate side rails in place.

## 2020-03-20 NOTE — ED ADULT NURSE NOTE - CHIEF COMPLAINT QUOTE
[address] [address]    Phone:  [phone]                                       After Visit Summary   12/13/2017    Betty Villasenor    MRN: [id number]           After Visit Summary Signature Page     I have received my discharge instructions, and my questions have been answered. I have discussed any challenges I see with this plan with the nurse or doctor.    ..........................................................................................................................................  Patient/Patient Representative Signature      ..........................................................................................................................................  Patient Representative Print Name and Relationship to Patient    ..................................................               ................................................  Date                                            Time    ..........................................................................................................................................  Reviewed by Signature/Title    ...................................................              ..............................................  Date                                                            Time           ESRD dialyzed today contact with positive COVID 19 with fever

## 2020-03-23 LAB — SARS-COV-2 RNA SPEC QL NAA+PROBE: SIGNIFICANT CHANGE UP

## 2020-08-07 ENCOUNTER — APPOINTMENT (OUTPATIENT)
Dept: VASCULAR SURGERY | Facility: CLINIC | Age: 65
End: 2020-08-07
Payer: MEDICARE

## 2020-08-07 VITALS — TEMPERATURE: 97.1 F

## 2020-08-07 PROCEDURE — 93990 DOPPLER FLOW TESTING: CPT

## 2020-08-07 PROCEDURE — 99213 OFFICE O/P EST LOW 20 MIN: CPT

## 2020-08-07 NOTE — PHYSICAL EXAM
[2+] : right 2+ [1+] : left 1+ [de-identified] : intact [Normal] : normoactive bowel sounds, soft and nontender, no hepatosplenomegaly or masses appreciated [Thrill] : thrill

## 2020-08-07 NOTE — DISCUSSION/SUMMARY
[FreeTextEntry1] : 64 yo male with history of esrd no longer on hd s/p recent left sided renal transplant presents for follow up of left upper extremity radiocephalic avf now with occasional pain of the left lower extremity \par \par avf patent with 50-75% stenosis and flow rate of 778\par \par given lower extremity cramping pain will arrange for aminata/pvr to be completed (pt unable to stay for test today)

## 2020-08-07 NOTE — HISTORY OF PRESENT ILLNESS
[] : left radiocephalic fistula [FreeTextEntry1] : 64 yo male with history of esrd no longer on hd s/p recent transplant presents for follow up of left upper extremity radiocephalic avf.  pt states that he has been having occasional pain of the left lower extremity \par pt states that it is cramping pain in the calf.  pt states that the leg also feels weak after walking for about 2-3 blocks.  pt states that the transplant is of the left lower extremity

## 2020-10-12 ENCOUNTER — APPOINTMENT (OUTPATIENT)
Dept: VASCULAR SURGERY | Facility: CLINIC | Age: 65
End: 2020-10-12

## 2021-02-05 ENCOUNTER — APPOINTMENT (OUTPATIENT)
Dept: VASCULAR SURGERY | Facility: CLINIC | Age: 66
End: 2021-02-05
Payer: MEDICARE

## 2021-02-05 VITALS — TEMPERATURE: 97 F

## 2021-02-05 PROCEDURE — 93990 DOPPLER FLOW TESTING: CPT

## 2021-02-05 PROCEDURE — 99213 OFFICE O/P EST LOW 20 MIN: CPT

## 2021-02-05 NOTE — HISTORY OF PRESENT ILLNESS
[FreeTextEntry1] : 65 yo male with history of esrd no longer on hd s/p recent transplant (7/2020) presents for follow up of left upper extremity radiocephalic avf.  pt without any complaints of the left upper extremity and states that the renal transplant is functioning normally at this time.  pt was questioning the aneurysmal areas on the left upper extremity

## 2021-02-05 NOTE — DISCUSSION/SUMMARY
[FreeTextEntry1] : 67 yo male with history of esrd no longer on hd s/p recent transplant (7/2020) presents for follow up of left upper extremity radiocephalic avf.\par \par duplex shows 2 areas of >75% in stent stenosis with flow rate of 426 \par \par pt currently not on hd s/p renal transplant will continue to monitor no intervention at this time \par pt to follow up in 6 months with repeat duplex

## 2021-02-05 NOTE — PHYSICAL EXAM
[Thrill] : thrill [Aneurysm] : aneurysm [Hand well perfused] : hand well perfused [2+] : left 2+ [Normal] : normal rate, regular rhythm, normal S1/S2, no murmur [Pulsatile Thrill] : no pulsatile thrill [Ulcer] : no ulcer [de-identified] : intact [de-identified] :  strength 5/5 b/l upper extremities

## 2021-06-01 NOTE — PHYSICAL EXAM
[facility] Progress Note    Assessment:    1.  Paroxysmal atrial fibrillation currently in sinus rhythm on both sotalol and Xarelto  2.  Sinus node dysfunction with permanent pacemaker in place  3.  Essential hypertension    Plan:    Continue the patient's current medical regiment.  Will arrange follow-up 1 year from now with no interim cardiac testing ordered    An After Visit Summary was printed and given to the patient.    Subjective:    87-year-old female who developed atrial fibrillation in the spring 2014.  She is maintained sinus rhythm over the last 5 years on sotalol therapy.  Patient developed evidence of sinus node dysfunction with permanent pacemaker placed in July 2014.  Over the last year she denies any episodes of tachypalpitations are new cardiovascular symptoms    Device interrogation today reveals normal pacemaker function.  Patient had an episode of atrial fibrillation on March 16 lasting 7 hours and 39 minutes.  The patient has 4years of battery longevity remaining    Problem List:    Patient Active Problem List   Diagnosis     Benign Essential Hypertension     Acute Tear Of Right Rotator Cuff Tendon     SSS (sick sinus syndrome) (H)     Episodic atrial fibrillation (H)     Cardiac pacemaker in situ, dual chamber         Current Outpatient Medications   Medication Sig Dispense Refill     ACCU-CHEK AMBER PLUS TEST STRP strips TEST ONCE D UTD  5     atorvastatin (LIPITOR) 20 MG tablet Take 20 mg by mouth at bedtime.       calcium-vitamin D 250-100 mg-unit per tablet Take 1 tablet by mouth daily.        metFORMIN (GLUCOPHAGE-XR) 500 MG 24 hr tablet Take 500 mg by mouth daily with breakfast.       sotalol (BETAPACE) 80 MG tablet TAKE 1/2 TABLET BY MOUTH TWICE DAILY 90 tablet 1     XARELTO 20 mg Tab TAKE 1 TABLET(20 MG) BY MOUTH DAILY WITH SUPPER 90 tablet 1     albuterol (PROAIR HFA) 90 mcg/actuation inhaler Inhale 2 puffs as needed for wheezing.       hydroCHLOROthiazide  (MICROZIDE) 12.5 mg capsule Take 12.5 mg by mouth daily.       losartan (COZAAR) 50 MG tablet Take 25 mg by mouth daily.   0     losartan-hydrochlorothiazide (HYZAAR) 50-12.5 mg per tablet TK 1 T PO QD  4     NON FORMULARY Calm  325mg magnesium daily       UNABLE TO FIND 1 tablet daily. Med Name: Life [place] Protandim       UNABLE TO FIND 1 tablet daily. Med Name: Ultimate Eye Support       UNABLE TO FIND 1 tablet daily. Med Name: Circulation Vein Support       No current facility-administered medications for this visit.        .  Past Surgical History:   Procedure Laterality Date     NEUROPLASTY / TRANSPOSITION MEDIAN NERVE AT CARPAL TUNNEL       ID REMOVAL OF TONSILS,<11 Y/O      Description: Tonsillectomy;  Recorded: 05/20/2014;     ID REVISE MEDIAN N/CARPAL TUNNEL SURG      Description: Neuroplasty Decompression Median Nerve At Carpal Tunnel;  Recorded: 05/20/2014;     ID REVISE SECONDARY VARICOSITY      Description: Varicose Vein Ligation;  Recorded: 05/20/2014;     TONSILLECTOMY       VARICOSE VEIN SURGERY       WRIST SURGERY         .  Social History     Socioeconomic History     Marital status:      Spouse name: Not on file     Number of children: Not on file     Years of education: Not on file     Highest education level: Not on file   Occupational History     Not on file   Social Needs     Financial resource strain: Not on file     Food insecurity:     Worry: Not on file     Inability: Not on file     Transportation needs:     Medical: Not on file     Non-medical: Not on file   Tobacco Use     Smoking status: Never Smoker     Smokeless tobacco: Never Used   Substance and Sexual Activity     Alcohol use: No     Drug use: No     Sexual activity: Not on file   Lifestyle     Physical activity:     Days per week: Not on file     Minutes per session: Not on file     Stress: Not on file   Relationships     Social connections:     Talks on phone: Not on file     Gets together: Not on file     Attends  "Restorationist service: Not on file     Active member of club or organization: Not on file     Attends meetings of clubs or organizations: Not on file     Relationship status: Not on file     Intimate partner violence:     Fear of current or ex partner: Not on file     Emotionally abused: Not on file     Physically abused: Not on file     Forced sexual activity: Not on file   Other Topics Concern     Not on file   Social History Narrative     Not on file       .No family history on file.  Review of Systems:  General: WNL  Eyes: Visual Distubance  Ears/Nose/Throat: WNL  Lungs: WNL  Heart: WNL  Stomach: WNL  Bladder: WNL  Muscle/Joints: WNL  Skin: WNL  Nervous System: WNL  Mental Health: WNL     Blood: Easy Bleeding    Objective:     /78 (Patient Site: Left Arm, Patient Position: Sitting, Cuff Size: Adult Regular)   Pulse 60   Resp 16   Ht 5' 5\" (1.651 m)   Wt 160 lb (72.6 kg)   BMI 26.63 kg/m    160 lb (72.6 kg)   [unfilled]    Physical Exam:    GENERAL APPEARANCE: alert, no apparent distress  HEENT: no scleral icterus or xanthelasma  NECK: jugular venous pressure normal  CHEST: symmetric, the lungs were clear to auscultation  CARDIOVASCULAR: regular rhythm without murmur, click, or gallop; no carotid bruits  ABDOMEN: nondistended, nontender, bowel sounds present  EXTREMITIES: no cyanosis, clubbing or edema.    Cardiac Testing:    None in the last year      Lab Results:    LIPIDS:  Lab Results   Component Value Date    CHOL 137 01/09/2019    CHOL 128 01/18/2018    CHOL 179 01/18/2017     Lab Results   Component Value Date    HDL 53 01/09/2019    HDL 51 01/18/2018    HDL 41 (L) 01/18/2017     Lab Results   Component Value Date    LDLCALC 68 01/09/2019    LDLCALC 62 01/18/2018    LDLCALC 115 01/18/2017     Lab Results   Component Value Date    TRIG 82 01/09/2019    TRIG 76 01/18/2018    TRIG 117 01/18/2017     No components found for: CHOLHDL    BMP:  Lab Results   Component Value Date    CREATININE 0.81 " 01/09/2019    BUN 13 01/09/2019     (L) 01/09/2019    K 4.3 01/09/2019    CL 98 01/09/2019    CO2 22 01/09/2019         Marshall Joseph MD,F.A.C.C.  [facility]  [phone]                 [Pulsatile Thrill] : pulsatile thrill

## 2021-06-12 NOTE — ED PROVIDER NOTE - PROGRESS NOTE DETAILS
Refill Request  Did you contact pharmacy: Yes  Medication name:   Requested Prescriptions     Pending Prescriptions Disp Refills     desogestreL-ethinyl estradioL (APRI) 0.15-0.03 mg per tablet 84 tablet 0     Sig: Take 1 tablet by mouth daily.     Who prescribed the medication: Ema Quinones MD  Requested Pharmacy: [facility]  Is patient out of medication: N/A  Patient notified refills processed in 3 business days:  N/A  Okay to leave a detailed message: yes    Please write prescription as Qty 28 with refills per insurance requirements.        Rayo: Patient signed out to me to follow up ICU recs. ICU attending as assessed patient. Patient transitioned to NC. Tolerating well. Will admit to Tele. Sign out complete with Dr. Crawford and Dr. Devine.

## 2021-10-01 ENCOUNTER — APPOINTMENT (OUTPATIENT)
Dept: VASCULAR SURGERY | Facility: CLINIC | Age: 66
End: 2021-10-01

## 2021-11-19 NOTE — DISCHARGE NOTE NURSING/CASE MANAGEMENT/SOCIAL WORK - NSDCPETBCESMAN_GEN_ALL_CORE
If you are a smoker, it is important for your health to stop smoking. Please be aware that second hand smoke is also harmful.
Self

## 2022-01-19 NOTE — H&P PST ADULT - MEDICATION HERBAL REMEDIES, PROFILE
TN spoke with Farzaneh with [facility]. Patient could not be accepted if he is currently cocaine dependent based on diagnosis of chronic cocaine dependence (in remmission). TN to follow up.    01/19/22 0858   Post-Acute Status   Post-Acute Authorization Placement   Post-Acute Placement Status Pending payor medical review/second level review   Discharge Delays (!) Post-Acute Set-up   Discharge Plan   Discharge Plan A Skilled Nursing Facility      no

## 2022-03-21 ENCOUNTER — APPOINTMENT (OUTPATIENT)
Dept: VASCULAR SURGERY | Facility: CLINIC | Age: 67
End: 2022-03-21

## 2022-05-05 ENCOUNTER — EMERGENCY (EMERGENCY)
Facility: HOSPITAL | Age: 67
LOS: 1 days | Discharge: HOME | End: 2022-05-05
Attending: EMERGENCY MEDICINE
Payer: MEDICARE

## 2022-05-05 VITALS
WEIGHT: 175.93 LBS | DIASTOLIC BLOOD PRESSURE: 89 MMHG | TEMPERATURE: 99 F | RESPIRATION RATE: 17 BRPM | OXYGEN SATURATION: 96 % | SYSTOLIC BLOOD PRESSURE: 133 MMHG | HEIGHT: 67 IN | HEART RATE: 86 BPM

## 2022-05-05 VITALS
OXYGEN SATURATION: 97 % | DIASTOLIC BLOOD PRESSURE: 82 MMHG | TEMPERATURE: 98 F | SYSTOLIC BLOOD PRESSURE: 128 MMHG | RESPIRATION RATE: 18 BRPM | HEART RATE: 79 BPM

## 2022-05-05 PROCEDURE — 99283 EMERGENCY DEPT VISIT LOW MDM: CPT

## 2022-05-05 RX ORDER — VALACYCLOVIR 500 MG/1
1000 TABLET, FILM COATED ORAL ONCE
Refills: 0 | Status: COMPLETED | OUTPATIENT
Start: 2022-05-05 | End: 2022-05-05

## 2022-05-05 RX ORDER — VALACYCLOVIR 500 MG/1
1 TABLET, FILM COATED ORAL
Qty: 7 | Refills: 0
Start: 2022-05-05 | End: 2022-05-11

## 2022-05-05 RX ORDER — BACITRACIN ZINC 500 UNIT/G
1 OINTMENT IN PACKET (EA) TOPICAL
Qty: 28 | Refills: 0
Start: 2022-05-05 | End: 2022-05-11

## 2022-05-05 RX ORDER — OXYCODONE AND ACETAMINOPHEN 5; 325 MG/1; MG/1
1 TABLET ORAL ONCE
Refills: 0 | Status: DISCONTINUED | OUTPATIENT
Start: 2022-05-05 | End: 2022-05-05

## 2022-05-05 RX ORDER — IBUPROFEN 200 MG
600 TABLET ORAL ONCE
Refills: 0 | Status: DISCONTINUED | OUTPATIENT
Start: 2022-05-05 | End: 2022-05-05

## 2022-05-05 RX ORDER — OXYCODONE AND ACETAMINOPHEN 5; 325 MG/1; MG/1
1 TABLET ORAL
Qty: 12 | Refills: 0
Start: 2022-05-05 | End: 2022-05-07

## 2022-05-05 RX ADMIN — VALACYCLOVIR 1000 MILLIGRAM(S): 500 TABLET, FILM COATED ORAL at 06:55

## 2022-05-05 RX ADMIN — OXYCODONE AND ACETAMINOPHEN 1 TABLET(S): 5; 325 TABLET ORAL at 06:55

## 2022-05-05 NOTE — ED PROVIDER NOTE - CLINICAL SUMMARY MEDICAL DECISION MAKING FREE TEXT BOX
69 year old male with rash to left face. PE as above.  valacyclovir, pain control, reassess 69 year old male with rash to left face. PE as above.  valacyclovir, pain control, reassess    will dc with 500mg QD given renal impairment. dc with pain control. will f/u with his nephrologist within 1-2 days for further eval. return precautions discussed.

## 2022-05-05 NOTE — ED ADULT TRIAGE NOTE - ESI TRIAGE ACUITY LEVEL, MLM
Form has been completed by provider.     Form sent out via: Fax to [facility] at Fax Number: [phone]  Patient informed: n/a  Output date: April 22, 2022    Ira Ramirez      **Please close the encounter**       3

## 2022-05-05 NOTE — ED ADULT NURSE NOTE - OBJECTIVE STATEMENT
Pt presents to the ED with c/o rash and redness to left eyebrow and eyelid, with itchiness and burning sensation.

## 2022-05-05 NOTE — ED PROVIDER NOTE - NSFOLLOWUPINSTRUCTIONS_ED_ALL_ED_FT
ArabicBosnianCanadian FrenchEnglishHaitian CreoleKoreanPolishPortugueseRussianSpanishTagalogTraditional ChineseVietnamese                                                                                                         ???????????? ?????    Shingles    A rash on the skin.   ???????????? ?????, ????????? ????? ??? ????????? ???????????? ?????? — ??? ????????, ??????? ???????? ????????? ?? ???? ??????????? ???? ? ?????????, ???????????? ?????????. ??? ???????? ???????? ?????.    ???????????? ????? ??????????? ?????? ? ?????, ???????:  •?????????? ???????? ?????.      •?????? ?????????? ?????? ???????? ????. ???????????? ????? ? ????? ???? ?????? ??????????? ?????.        ?????? ????????    ???????? ????????????? ????? ???????? ????? ???????? ????. ??? ??? ?? ????? ?????, ??????? ???????? ???????? ????. ????? ????????? ???????? ???? ???????, ?? ???????? ? ????????? ? ?????????? (??????) ?????????. ???????????? ????? ???????????, ???? ???? ????? ??????????????. ??? ????? ????????? ????? ????????? ??? ????? ??????? (????????????) ????????? ???????. ??????? ??????????? ??????? ?????? ??????????.      ??? ???????? ?????    ????, ?????????? ???????? ???? ??? ?????????? ??????? ?????? ???????? ????, ?????????? ????? ???????? ???????????? ??????. ???????? ????????????? ????? ???? ??????????? ? ?????, ???????:  •?????? 60 ???.    •????? ??????????? ??????? ?????? ? ????????? (???????? ???????), ????????, ? ????? ?:  •??? (?????? ?????????????? ????????).      •????? (???????? ?????????????? ??????????????).      •?????.        •????????? ?????????, ??????? ????????? ???????? ???????, ????????, ????????? ??? ????? ? ????????????? ????????.      •?????????? ??????? ?????????? ????????.        ?????? ???????? ??? ?????????    ??????? ?????????? ????? ????????? ???????? ???, ??????????? ? ???? ?? ??????? ????. ???? ????? ??????????? ??? ??????????, ??????? ??? ???????????? ????.    ????? ????????? ???? ??? ?????? ????? ????????????? ????????? ?????????? ??????????? ??????? ????. ???? ?????? ???????????? ?? ????? ???????? ???? ? ???? ????? ??? ??????. ??? ???? ? ???????? ????? ???????????? ? ??????????? ????????? ???????, ??????? ????????, ?????????, ????????? ????? ? ????????? ????? ???????? 2–3 ??????.    ? ????? ????? ? ???? ?????? ???????? ????? ??????????:  •?????????.      •?????.      •???????? ????.      •???????.        ??? ??? ??????????????    ??? ????????? ????????????? ? ?????? ???????????? ????. ??? ?????????? ???????? ?? ???????? ????? ????? ??????? ???? ??? ???????? (?????).      ??? ??? ??????    ????????? ?? ???? ????? ?????????? ? ??????? ?????????? ??????. ???????????? ??????? ????? ????????? ?? ??????????. ??? ??????? ???? ????? ????????? ?????????, ??????? ??????? ????, ???????????? ????? ???????? ????????????? ? ???????? ???????? ???????????? ??????????. ????? ???????? ????? ????:  •??????????????? ?????????.      •????????????????????? ?????????.      •?????????????? ????????.      •????????????? ???????? (??????????????? ?????????).      ???? ??????? ????????? ????????? ?? ????, ??? ????? ????????? ? ???????????, ????????, ? ???????? ????? (????????????) ??? ? ??????????? ?? ???, ???? ? ????? (???) (?????????????), ????? ???????? ????????? ??????, ???????? ??????????? ???? ??? ????????????.      ? ???????? ???????? ???????? ????? ????????????:    ????????????? ?????????     •?????????? ?????????????? ? ??????????? ????????????? ????????? ?????? ? ???????????? ? ?????????? ?????? ???????? ?????.      •?? ?????????? ??????? ????? ???????? ????????????? ???? ??? ?????????????? ????, ??? ??????? ????? ??????? ??????.        ?????????? ???? ? ?????????? ????????   A bathtub filled with water.   •????????????? ???????? ??????? ???????? (???????? ?????????) ? ????? ???? ??? ????????, ??? ??????? ????? ??????? ??????.      •?????????? ????? ????? ???????? ????????????? ?????????. ????? ????????? ???, ?????????? ???????? ??????? ???? ??? ??????? ????? ? ???? ??? ???????. ?? ????????? ? ??????? ????.      •?????????? ???????? ?? ????????? ?????? ? ???????????? ? ?????????????? ?????? ???????? ?????. ??? ?????????????? ??????, ??????? ???????? ????????? ???.      ???? ?? ?????, ?????????? ????????? ? ?????     •???????? ?? ???? ????????? ???????????? ???????? ?? ????? ??? ????? (???????). ?????? ????????? ??????, ????? ????????? ????, ????????? ??????? ????? ? ????.      •????? ???? ????? ? ????? ? ??????? ??????? 20 ?????? ?? ? ????? ????? ????? ???????. ???? ???? ? ???? ??????????, ??????????? ??????????????? ???????? ??? ???.      •??????? ???? ??????? ? ???????????? ? ?????????? ?????? ???????? ?????.      •????????????? ??????? ???? ? ???????? ????? ?????????? ?????? ??????? ?????????? ????? ? ?????? ?????, ??? ??? ??????? ????? ??????? ??????.    •?????????? ???? ?????? ???? ?? ??????? ????????? ????????. ?????????? ??:  •???????? ???????????, ????? ??? ????.      •???????? ??? ?????.      •??????? ????????? ???????????.      •???? ??? ?????????? ?????.      • ?? ???????????? ???? ???? ? ?? ????????? ???? ???????. ?? ????????? ????????:  •??????? ?? ???, ????? ????? ???? ??????? ? ??????? ??????????????.      •???? ?? ??????? ???????? ????????, ????????? ?????? ???????? ??? ???????? ?? ????? ???.        ????? ????????     •?????????, ??? ??????? ????? ??????? ??????.      •????? ????? ???? ????? ? ????? ? ??????? ?? ????? 20 ??????. ???? ???? ? ???? ??????????, ??????????? ??????????????? ???????? ??? ???. ??? ??????? ??????????? ???????? ? ??? ????????????? ???????? ????.    •?? ????, ??? ??????? ??????????? ? ?????, ???? ???????? ????????????? ????? ????? ??????? ???????? ???? ? ?????, ??????? ??????? ?? ?? ?????? ??? ?? ???? ????????????? ?????? ???. ????? ????? ?? ?????????, ?? ????????????? ? ??????? ??????, ???????? ?:  •??????????.      •??????????? ?????????.      •??????, ? ??????? ??????.      •???????? ??????, ??????? ????????? ??????????????.      •??????, ??????????? ???????????? ?????????????, ????????, ????? ??? ??????.        •????????? ?? ??? ?????? ???????????? ??????????. ??? ?????.        ????? ?????????? ???? ?????????????    ?????????? — ?????? ?????? ????????????? ???????????? ????? ? ?????????? ?? ?????????? ????????????? ?????. ???? ?? ?? ???? ?????????????, ??????????????????? ? ??????? ?????? ? ??????????? ???????? ?????? ???????.      ??? ????? ????? ?????????????? ??????????    •Centers for Disease Control and Prevention (?????? ?? ???????? ? ???????????? ??????????? ???): [email or web address]        ?????????? ? ???????? ?????, ????:    •???? ???? ?????????? ????? ??????? ??????????? ????????.      •???? ???? ?? ???????? ????? ????????? ????.    •? ??? ????????? ????? ?? ????????? ????????? ????????:  •????????? ???????????, ???? ??? ???? ?????? ????.      •????????? ???????? ??? ????? ?? ????.      •????????? ??????????? ????.      •?? ???? ?????????? ???? ??? ??????? ?????????? ?????.      •?????????.          ?????????? ?????????? ?? ???????, ????:    •???? ????????? ?? ???? ??? ????.      •? ??? ??????? ????, ???? ?????? ???? ??? ?????? ???????????????? ?? ????? ??????? ????.      •? ??? ????????? ??????.      •? ??? ???? ? ??? ??? ???? ? ????.      •? ??? ?????? ???????? ????????????????.      •???? ????????? ??????????.        ??????    •???????????? ?????, ????????? ????? ??? ?????? ?????? – ??? ????????, ??????? ???????? ????????? ?? ???? ??????????? ???? ? ?????????, ???????????? ?????????.      •??? ????????? ????????????? ? ?????? ???????????? ????. ?? ???????? ????? ????? ??????? ???? ??? ???????? (?????).      •???????? ?? ???? ????????? ???????????? ???????? ?? ????? ??? ????? (???????). ?????? ????????? ??????, ????? ????????? ????, ????????? ??????? ????? ? ????.      •?? ????, ??? ??????? ??????????? ? ?????, ???? ???????? ????????????? ????? ????? ??????? ???????? ???? ? ?????, ??????? ??????? ?? ?? ?????? ??? ?? ???? ????????????? ?????? ???.      ??? ?????????? ?? ????? ???????? ??????, ??????????????? ????? ??????. ??????????? ???????? ????? ???????????? ??? ??????? ? ????? ??????? ??????.      Document Revised: 01/07/2022 Document Reviewed: 01/07/2022    Elsevier Patient Education © 2022 [employer] Inc[employer]

## 2022-05-05 NOTE — ED PROVIDER NOTE - PHYSICAL EXAMINATION
papulovesicular rash V1 distribution.  left eye: vision grossly unchanged. some periorbital swelling. PERRLA. EOMI b/l. fluorescein no uptake

## 2022-05-05 NOTE — ED PROVIDER NOTE - PATIENT PORTAL LINK FT
You can access the FollowMyHealth Patient Portal offered by [facility] by registering at the following website: [email or web address]. By joining [facility]’s FollowMyHealth portal, you will also be able to view your health information using other applications (apps) compatible with our system.

## 2022-05-05 NOTE — ED PROVIDER NOTE - OBJECTIVE STATEMENT
67 year old male PMH kidney transplant, HTN coming in with painful rash to left scalp/forehead that started 5 days ago. states initially mild and he went on vacation in [place] and then symptoms worse so came back home for eval. never had this before. has had shingles vaccinex2. states also feels very itchy and his eye also has some itching sensation. denies blurry vision, fevers, chills, sweats, and all other complaints.

## 2022-05-05 NOTE — ED ADULT TRIAGE NOTE - DATE OF LAST VACCINATION
Patient would like a call to discuss seeing someone for wound care.  Patient can be reached at [phone].   12-May-2021

## 2022-08-20 NOTE — PATIENT PROFILE ADULT - PACKS YRS CALCULATION
[facility] MD Matthew Pickens MD. Savanna Stafford MD   Progress Note    8/20/2022    SUBJECTIVE:   Stable vitals   Charts reviewed     PROBLEM LIST:    Principal Problem:    Hyponatremia  Resolved Problems:    * No resolved hospital problems. *       DIET:    ADULT DIET; Regular       Allergies : Patient has no known allergies. Past Medical History:   Diagnosis Date    CAD (coronary artery disease)     Chest pain 4/2002    Hospitalized at [facility]. Chronic back pain     Hyperlipidemia     Hypertension 1987    Industrial accident 1977    (Back, neck and head). Lump     Removed from elbow. MI (myocardial infarction) ([address]     Anterior apical.    MI (myocardial infarction) ([address] 2/10/1999    Acute inferior wall MI.    Seizure disorder ([facility] [address]     Status post insertion of iliac artery stent 11/1/82005    Syncope 9/1998    Tinnitus of both ears 2/13/2018       Past Surgical History:   Procedure Laterality Date    APPENDECTOMY      CORONARY ARTERY BYPASS GRAFT  5/3/1991    [address]:  AC - SVT to RCA, LIMA to LAD. DIAGNOSTIC CARDIAC CATH LAB PROCEDURE  4/9/1991    [address]  CAD. DIAGNOSTIC CARDIAC CATH LAB PROCEDURE  2/10/1999    [address] DIAGNOSTIC CARDIAC CATH LAB PROCEDURE  11/18/2005    NS: Dr. Diego Stout CATH LAB PROCEDURE  2/7/2006    With abdominal aortogram.    DIAGNOSTIC CARDIAC CATH LAB PROCEDURE  2/11/2009    [facility]. LUMBAR DISCECTOMY      THROMBECTOMY  11/18/2005    Exploration/thrombectomy of right common femoral and right popliteal arteries. Family History   Problem Relation Age of Onset    Heart Disease Mother         reports that he has never smoked. He has never used smokeless tobacco. He reports that he does not drink alcohol and does not use drugs.     Review of Systems:   Constitutional: no fevers , no chills , feels ok   Eyes: no eye pain , no itching , no drainage  Ears, nose, mouth, throat, and face: no ear ,nose pain , hearing is ok ,no nasal drainage   Respiratory: no sob ,no cough ,no wheezing . Cardiovascular: no chest pain , no palpitation ,no sob . Gastrointestinal: no nausea, vomiting , constipation , no abdominal pain . Genitourinary:no urinary retention , no burning , dysuria . No polyuria   Hematologic/lymphatic: no bleeding , no cougulation issues . Musculoskeletal:no joint pain , no swelling . Neurological: no headaches ,no weakness , no numbness . Endocrine: no thirst , no weight issues .      MEDS (scheduled):    aspirin  81 mg Oral Every Other Day    tamsulosin  0.4 mg Oral Daily    sodium chloride flush  10 mL IntraVENous 2 times per day    enoxaparin  40 mg SubCUTAneous Daily    cefTRIAXone (ROCEPHIN) IV  1,000 mg IntraVENous Q24H    lisinopril  20 mg Oral Daily       MEDS (infusions):   sodium chloride      dextrose 75 mL/hr at 08/19/22 0930       MEDS (prn):  potassium chloride **OR** potassium alternative oral replacement **OR** potassium chloride, sodium chloride flush, sodium chloride, promethazine **OR** ondansetron, polyethylene glycol, acetaminophen **OR** acetaminophen    PHYSICAL EXAM:     Patient Vitals for the past 24 hrs:   BP Temp Temp src Pulse Resp SpO2   08/20/22 1500 122/88 97.9 °F (36.6 °C) Oral 89 20 98 %   08/20/22 0819 116/71 97.7 °F (36.5 °C) Oral 85 20 99 %   08/20/22 0513 101/71 98.3 °F (36.8 °C) Oral 82 17 98 %   08/19/22 2159 (!) 83/58 98.1 °F (36.7 °C) Oral 84 18 96 %   @      Intake/Output Summary (Last 24 hours) at 8/20/2022 2048  Last data filed at 8/20/2022 1500  Gross per 24 hour   Intake 720 ml   Output 3200 ml   Net -2480 ml         Wt Readings from Last 3 Encounters:   08/18/22 210 lb (95.3 kg)   12/30/19 174 lb 12.8 oz (79.3 kg)   12/16/19 178 lb 4.8 oz (80.9 kg)       Constitutional:  in no acute distress  Oral: mucus membranes moist  Neck: no JVD  Cardiovascular: S1, S2 regular rhythm, no murmur,or rub  Respiratory:  No crackles, no wheeze  Gastrointestinal:  Soft, nontender, nondistended, NABS  Ext: no edema, feet warm  Skin: dry, no rash  Neuro: awake, alert, interactive      DATA:    Recent Labs     08/18/22  2209 08/19/22  0459 08/20/22  0617   WBC 10.6 8.0 6.8   HGB 10.0* 9.3* 9.8*   HCT 30.9* 29.4* 30.2*   MCV 81.1 80.1 81.2    202 213     Recent Labs     08/19/22  0459 08/19/22  1034 08/19/22  1514 08/20/22  0617    130* 137 137   K 4.2 3.9 3.6 3.3*    100 104 103   CO2 19* 20* 20* 24   MG  --   --   --  1.9   PHOS  --   --   --  2.8   BUN 13 13 14 10   CREATININE 1.0 1.0 1.2 1.0   ALT 5  --   --  6   AST 16  --   --  16   BILITOT 0.5  --   --  0.3   ALKPHOS 276*  --   --  263*       Lab Results   Component Value Date    LABPROT 0.3 (H) 08/18/2022    LABPROT 0.3 08/18/2022       ASSESSMENT / RECOMMENDATIONS:      Briefly, 81 y.o. gentleman admitted with episode of syncope. Does not recall circumstances with which he presented. Notes lately appetite has been poor: Notes that he drinks adequate amount of water. Most part denies all complaint. Serum sodium presentation was 122 mmol/L. Was started on IV normal saline at 100 cc/h. Serum sodium 10 hours later was 132 mmol/L. D5 water started. 4 hours later sodium 130 mmol/L. Assessment/recommendations  1. Hyponatremia, hypoosmolar, hypovolemic. Urinary indices are consistent with hypovolemic hyponatremia. Patient with initiation of IV fluid resuscitation with isotonic saline solution, serum sodium concentration increased briskly. D5 water has been started, and serum sodium has declined as expected.          Hypon Na resolved   Rate of correction appropriate    Cut D5w to 35 cc/hr   Encourage oral intake food and fluid  Continue supportive care    Electronically signed by Porfirio Gant MD on 8/20/2022 at 8:48 PM 0

## 2023-01-05 NOTE — PATIENT PROFILE ADULT - NSASFALLNEEDSASSISTWITH_GEN_A_NUR
Problem: Change in Fetal Wellbeing (Labor)  Goal: Stable Fetal Wellbeing  Outcome: Ongoing, Progressing  Intervention: Promote and Monitor Fetal Wellbeing  Flowsheets (Taken 1/5/2023 9656)  Body Position: position maintained  Fetal Wellbeing Promotion:   fetal heart rate monitored   uterine contraction activity assessed      toileting/standing/walking

## 2023-04-26 NOTE — ED ADULT NURSE NOTE - CCCP TRG CHIEF CMPLNT
shortness of breath/c/o sob [General Appearance - Alert] : alert [General Appearance - In No Acute Distress] : in no acute distress [Oriented To Time, Place, And Person] : oriented to person, place, and time [Affect] : the affect was normal [Memory Remote] : remote memory was not impaired [Memory Recent] : recent memory was not impaired [Cranial Nerves Optic (II)] : visual acuity intact bilaterally,  visual fields full to confrontation, pupils equal round and reactive to light [Cranial Nerves Oculomotor (III)] : extraocular motion intact [Cranial Nerves Trigeminal (V)] : facial sensation intact symmetrically [Cranial Nerves Vestibulocochlear (VIII)] : hearing was intact bilaterally [Cranial Nerves Facial (VII)] : face symmetrical [Cranial Nerves Glossopharyngeal (IX)] : tongue and palate midline [Cranial Nerves Accessory (XI - Cranial And Spinal)] : head turning and shoulder shrug symmetric [Cranial Nerves Hypoglossal (XII)] : there was no tongue deviation with protrusion [Motor Tone] : muscle tone was normal in all four extremities [Motor Strength] : muscle strength was normal in all four extremities [Sensation Tactile Decrease] : light touch was intact [Sensation Pain / Temperature Decrease] : pain and temperature was intact [Sensation Vibration Decrease] : vibration was intact [Abnormal Walk] : normal gait [2+] : Ankle jerk left 2+ [Optic Disc Abnormality] : the optic disc were normal in size and color [Dysarthria] : no dysarthria [Aphasia] : no dysphasia/aphasia [Romberg's Sign] : Romberg's sign was negtive [Coordination - Dysmetria Impaired Finger-to-Nose Bilateral] : not present [Plantar Reflex Right Only] : normal on the right [Plantar Reflex Left Only] : normal on the left

## 2023-09-08 ENCOUNTER — EMERGENCY (EMERGENCY)
Facility: HOSPITAL | Age: 68
LOS: 1 days | Discharge: HOME | End: 2023-09-08
Attending: STUDENT IN AN ORGANIZED HEALTH CARE EDUCATION/TRAINING PROGRAM
Payer: MEDICARE

## 2023-09-08 VITALS
OXYGEN SATURATION: 95 % | HEART RATE: 72 BPM | TEMPERATURE: 98 F | SYSTOLIC BLOOD PRESSURE: 105 MMHG | DIASTOLIC BLOOD PRESSURE: 66 MMHG | RESPIRATION RATE: 18 BRPM | HEIGHT: 67 IN | WEIGHT: 186.51 LBS

## 2023-09-08 VITALS
OXYGEN SATURATION: 97 % | HEART RATE: 75 BPM | SYSTOLIC BLOOD PRESSURE: 128 MMHG | RESPIRATION RATE: 16 BRPM | DIASTOLIC BLOOD PRESSURE: 75 MMHG

## 2023-09-08 DIAGNOSIS — Z94.0 KIDNEY TRANSPLANT STATUS: Chronic | ICD-10-CM

## 2023-09-08 LAB
ALBUMIN SERPL ELPH-MCNC: 3.6 G/DL — SIGNIFICANT CHANGE UP (ref 3.5–5)
ALP SERPL-CCNC: 73 U/L — SIGNIFICANT CHANGE UP (ref 40–120)
ALT FLD-CCNC: 29 U/L DA — SIGNIFICANT CHANGE UP (ref 10–60)
ANION GAP SERPL CALC-SCNC: 4 MMOL/L — LOW (ref 5–17)
APTT BLD: 28 SEC — SIGNIFICANT CHANGE UP (ref 24.5–35.6)
AST SERPL-CCNC: 27 U/L — SIGNIFICANT CHANGE UP (ref 10–40)
BASOPHILS # BLD AUTO: 0 K/UL — SIGNIFICANT CHANGE UP (ref 0–0.2)
BASOPHILS NFR BLD AUTO: 0 % — SIGNIFICANT CHANGE UP (ref 0–2)
BILIRUB SERPL-MCNC: 0.7 MG/DL — SIGNIFICANT CHANGE UP (ref 0.2–1.2)
BUN SERPL-MCNC: 25 MG/DL — HIGH (ref 7–18)
CALCIUM SERPL-MCNC: 8.6 MG/DL — SIGNIFICANT CHANGE UP (ref 8.4–10.5)
CHLORIDE SERPL-SCNC: 107 MMOL/L — SIGNIFICANT CHANGE UP (ref 96–108)
CO2 SERPL-SCNC: 25 MMOL/L — SIGNIFICANT CHANGE UP (ref 22–31)
CREAT SERPL-MCNC: 1.67 MG/DL — HIGH (ref 0.5–1.3)
EGFR: 45 ML/MIN/1.73M2 — LOW
EOSINOPHIL # BLD AUTO: 0.29 K/UL — SIGNIFICANT CHANGE UP (ref 0–0.5)
EOSINOPHIL NFR BLD AUTO: 3 % — SIGNIFICANT CHANGE UP (ref 0–6)
GLUCOSE SERPL-MCNC: 130 MG/DL — HIGH (ref 70–99)
HCT VFR BLD CALC: 47.5 % — SIGNIFICANT CHANGE UP (ref 39–50)
HGB BLD-MCNC: 15.9 G/DL — SIGNIFICANT CHANGE UP (ref 13–17)
INR BLD: 0.91 RATIO — SIGNIFICANT CHANGE UP (ref 0.85–1.18)
LYMPHOCYTES # BLD AUTO: 0.49 K/UL — LOW (ref 1–3.3)
LYMPHOCYTES # BLD AUTO: 5 % — LOW (ref 13–44)
MCHC RBC-ENTMCNC: 28.8 PG — SIGNIFICANT CHANGE UP (ref 27–34)
MCHC RBC-ENTMCNC: 33.5 GM/DL — SIGNIFICANT CHANGE UP (ref 32–36)
MCV RBC AUTO: 85.9 FL — SIGNIFICANT CHANGE UP (ref 80–100)
MONOCYTES # BLD AUTO: 0.59 K/UL — SIGNIFICANT CHANGE UP (ref 0–0.9)
MONOCYTES NFR BLD AUTO: 6 % — SIGNIFICANT CHANGE UP (ref 2–14)
NEUTROPHILS # BLD AUTO: 8.45 K/UL — HIGH (ref 1.8–7.4)
NEUTROPHILS NFR BLD AUTO: 86 % — HIGH (ref 43–77)
NT-PROBNP SERPL-SCNC: 155 PG/ML — HIGH (ref 0–125)
PLATELET # BLD AUTO: 189 K/UL — SIGNIFICANT CHANGE UP (ref 150–400)
POTASSIUM SERPL-MCNC: 4.8 MMOL/L — SIGNIFICANT CHANGE UP (ref 3.5–5.3)
POTASSIUM SERPL-SCNC: 4.8 MMOL/L — SIGNIFICANT CHANGE UP (ref 3.5–5.3)
PROT SERPL-MCNC: 6.8 G/DL — SIGNIFICANT CHANGE UP (ref 6–8.3)
PROTHROM AB SERPL-ACNC: 10.4 SEC — SIGNIFICANT CHANGE UP (ref 9.5–13)
RBC # BLD: 5.53 M/UL — SIGNIFICANT CHANGE UP (ref 4.2–5.8)
RBC # FLD: 13.7 % — SIGNIFICANT CHANGE UP (ref 10.3–14.5)
SODIUM SERPL-SCNC: 136 MMOL/L — SIGNIFICANT CHANGE UP (ref 135–145)
TROPONIN I, HIGH SENSITIVITY RESULT: 10.7 NG/L — SIGNIFICANT CHANGE UP
WBC # BLD: 9.82 K/UL — SIGNIFICANT CHANGE UP (ref 3.8–10.5)
WBC # FLD AUTO: 9.82 K/UL — SIGNIFICANT CHANGE UP (ref 3.8–10.5)

## 2023-09-08 PROCEDURE — 83880 ASSAY OF NATRIURETIC PEPTIDE: CPT

## 2023-09-08 PROCEDURE — 71046 X-RAY EXAM CHEST 2 VIEWS: CPT | Mod: 26

## 2023-09-08 PROCEDURE — 85610 PROTHROMBIN TIME: CPT

## 2023-09-08 PROCEDURE — 99285 EMERGENCY DEPT VISIT HI MDM: CPT

## 2023-09-08 PROCEDURE — 85025 COMPLETE CBC W/AUTO DIFF WBC: CPT

## 2023-09-08 PROCEDURE — 85730 THROMBOPLASTIN TIME PARTIAL: CPT

## 2023-09-08 PROCEDURE — 93005 ELECTROCARDIOGRAM TRACING: CPT

## 2023-09-08 PROCEDURE — 80053 COMPREHEN METABOLIC PANEL: CPT

## 2023-09-08 PROCEDURE — 99285 EMERGENCY DEPT VISIT HI MDM: CPT | Mod: 25

## 2023-09-08 PROCEDURE — 71046 X-RAY EXAM CHEST 2 VIEWS: CPT

## 2023-09-08 PROCEDURE — 84484 ASSAY OF TROPONIN QUANT: CPT

## 2023-09-08 PROCEDURE — 36415 COLL VENOUS BLD VENIPUNCTURE: CPT

## 2023-09-08 RX ORDER — ASPIRIN/CALCIUM CARB/MAGNESIUM 324 MG
325 TABLET ORAL ONCE
Refills: 0 | Status: COMPLETED | OUTPATIENT
Start: 2023-09-08 | End: 2023-09-08

## 2023-09-08 RX ADMIN — Medication 325 MILLIGRAM(S): at 09:43

## 2023-09-08 NOTE — ED PROVIDER NOTE - CLINICAL SUMMARY MEDICAL DECISION MAKING FREE TEXT BOX
Patient is a 67 y/o M who presented with typical angina (mid to L chest pressure radiating to the L arm). Given Hx of CAD w/ 3 stents, r/o ACS. EKG done showed NSR, IRBBB similar to 2019. Ordered CBC, CMP, Trops. Given  mg. Patient is a 69 y/o M who presented with typical angina (mid to L chest pressure radiating to the L arm). Given Hx of CAD w/ 3 stents, r/o ACS. EKG done showed NSR, IRBBB similar to 2019. Ordered CBC, CMP, Trops. Given  mg. Troponin 10 negative. SCr 1.6 (baseline 1.4-1.7). Patient has a scheduled follow-up with [facility] on September 13, 2023 w/ the Cardiologist. Offered admission due to high-cardiac risk factor, but patient preferred to follow-up outpatient. Advised to take ASA 81 mg and Tylenol for the chest pain.

## 2023-09-08 NOTE — ED PROVIDER NOTE - OBJECTIVE STATEMENT
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 He was previously admitted 2019 for ACS r/o w. negative stress test and normal ECHO (50-55%). He now presents with intermittent mid to L sided chest pressure radiating to the L arm, worsen by activity/ exertion. He also reports episodes of dizziness/ shortness of breath. He has no palpitations nor leg swelling.

## 2023-09-08 NOTE — ED PROVIDER NOTE - PATIENT PORTAL LINK FT
You can access the FollowMyHealth Patient Portal offered by [facility] by registering at the following website: [email or web address]. By joining [employer]’s FollowMyHealth portal, you will also be able to view your health information using other applications (apps) compatible with our system.

## 2023-09-08 NOTE — ED ADULT NURSE NOTE - OBJECTIVE STATEMENT
pt with hx of kidney transplant & angioplasty with stent X2 placement presents with chest tightness.

## 2023-09-08 NOTE — ED PROVIDER NOTE - ATTENDING APP SHARED VISIT CONTRIBUTION OF CARE
PAtient presenting for cp. vital stable  normal s1/s2  lungs clear  will obtain lab, ro acs. ekg unchange from prior. cxr, r/o ptx. ed obs and reassess. patient saturation and hr normal. low suspicion for pe.

## 2023-09-08 NOTE — ED ADULT TRIAGE NOTE - BP NONINVASIVE DIASTOLIC (MM HG)
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  3 months ago he started to develop episodes where it appears as if the floor is buckling and he feels drunk. He becomes lightheaded and nearly passes out. He has to lay down immediately to prevent himself from passing out. He had 4 of these episodes and was admitted to [facility] May 30 through June 1. Imaging studies which are discussed further below showed an acute left cerebellar infarct. He was discharged on aspirin and Lipitor. He was symptom free until about a week ago when he again started to develop similar episodes, however this time the floor did not buckle. He was readmitted to the hospital where a new MRI showed a recent infarct in the right medial cerebellum. Of note he had a CANDY and a loop recorder placed. He had an additional episode last night but did not go to the hospital this time.\par \par We put him on Aggrenox twice a day. After I saw him I actually reviewed the images of his MRIs and concurred with the impressions that there was an initial cerebellar stroke followed by a subsequent cerebellar stroke in the opposite side. Tilt table test was negative. He saw hematologist and had a negative hypercoagulable workup. He has a loop recorder.\par \par He was doing well until 9/18/18.  After eating a very heavy meal prior to fasting he had another episode although not quite as severe or prolonged than the others. He believes it was partially precipitated by him wearing a very tight shirt around the neck.\par \par On 10/9/18 he had another episode that was different from prior ones. This was more of a lightheaded sensation And generalized weakness. It lasted about 8 hours. He was in the hospital under observation. Repeat brain MRI showed no change. Blood pressure medications were reduced. He remains on Aggrenox. He had been fine since.\par \par  Risk factors including blood pressure and cholesterol have been under excellent control.\par \par He had been doing well, continuing Aggrenox. 6/4/19  he had another spell. He became weak, lightheaded and diaphoretic. He did not pass out. It resolved after about 30 minutes.\par \par Brain MRI at that time showed nothing acute. Interrogation of the loop recorder was unremarkable.\par \par On 7/16/19 had another episode of lightheadedness. He went to the hospital. Brain MRI at that time showed a new left cerebellar infarct. MRA of the head and neck was negative. The loop recorder has shown no arrhythmia. EEG was normal. Cholesterol and LDL have been fine. We then switched him to aspirin initially 325 mg a day and Plavix 75 mg a day. He has remained symptom free. He is having no problem with the medication. Aspirin was reduced to 81 mg a day.\par \par He   sought second opinions from Dr. Salcedo and Dr. Anderson. No changes were recommended. He says he has had  guaiac positive stools, but on recent testing they were negative. . Endoscopy and colonoscopy are on hold. \par \par Early March of 2020 he developed mild fever and cough. Him and his wife tested positive for Covid 19. Symptoms remained mild. Quarantined at home. Had 2 episodes of confusion during this. One occasion he couldn't do simple math, the other he didn't recognize his wife briefly upon awakening. Other than these episodes he has been fine.\par \par He had been doing fine until 7/5/20. He went to [facility] and became very dehydrated. He experienced one of his spells where things started to tilt on him. He was admitted to [facility]. MRI of the brain showed nothing acute, just old strokes. MRA suspected dissection of the right vertebral artery. A CTA, however, showed small vertebral arteries especially on the right without any focal stenosis or dissection. He has been fine since. They switched the  Plavix to Brilinta. He is on aspirin 81 mg a day as well. He subsequently saw Dr. Anderson at [place] who recommended going back to the Plavix instead of Brillinta since there had been no stroke. He has been asymptomatic since.\par \par He has remained stable without any further events.His wife had reported some short-term memory loss ever since Covid infection in March of 2020. That has remained stable as well.\par \par He returned6/15/2022 with his wife.  Have not seen him in about a year.  They reports decline in memory.  He has intermittent trouble expressing himself.  That is going on about 6 months.  It is worse late in the day.  He also has some hallucinations where he was seeing multiple people.  He is having trouble performing simple mathematical tasks.  He remains on aspirin, Plavix, statin as well as blood pressure medication.\par \par I referred him for a brain MRI which was negative except for his prior strokes.  EEG was normal.  I referred him for neuropsychological evaluation.  That report has been reviewed and discussed with the patient and his wife.  There were neurocognitive abnormalities in domains including memory, executive functions and intellectual functions.  The neuropsychologist did not feel findings were consistent with Alzheimer's but there was definite cognitive impairment. 67

## 2023-09-08 NOTE — ED PROVIDER NOTE - PROGRESS NOTE DETAILS
Patient lab wnl. creatinine within patient baseline per family. 1.4-1.7 baseline. clinically well. patient has appt with cardiologist in 5 days. offered admission for cardiac and monitor. patient declined. state he feels well and would rather f.u outpatient. risk and benefit discussed. return precaution instructed. instructed to return immediately if noting new or worsening symptoms.

## 2023-09-08 NOTE — ED PROVIDER NOTE - NS ED ATTENDING STATEMENT MOD
This was a shared visit with the KARLA. I reviewed and verified the documentation and independently performed the documented: Attending with

## 2023-09-08 NOTE — ED PROVIDER NOTE - NSICDXPASTMEDICALHX_GEN_ALL_CORE_FT
PAST MEDICAL HISTORY:  ESRD (end stage renal disease) on dialysis     High cholesterol     HTN (hypertension)     IgA nephropathy     MTHFR mutation

## 2023-11-07 NOTE — ED PROVIDER NOTE - MUSCULOSKELETAL, MLM
Regarding: [place] 34 M Chest hurts  ----- Message from Shona Lopez sent at 11/7/2023  3:51 PM CST -----  Patient Name: Jarad Jama    Specialist or PCP Name: Dr. Franco    Symptoms: Chest hurts    Pregnant (females aged 13-60. If Yes, how long?) : N/A    Call Back # : [phone]    Which State are you currently located in?: [place]  Name of Clinic Site / Acct# : [facility]    Use following scripting for patients waiting for a callback:   \"Nurse callback times vary based on call volumes; please be aware the return phone call may come from an unidentified or out of state phone number. If your symptoms worsen or become life threatening while waiting, you should seek immediate assistance by calling 911 or going to the ER for evaluation.\"     b/l LE's without edema

## 2024-09-20 NOTE — ED ADULT NURSE NOTE - IN THE PAST 12 MONTHS HAVE YOU USED DRUGS OTHER THAN THOSE REQUIRED FOR MEDICAL REASON?
Group Topic:  TEJAS Activity Group    Date: 9/20/2024  Start Time:  2:00 PM  End Time:  2:45 PM  Facilitators: Darshana Cook; Irvin Jones; Laura Morrison APSW; Gonzalez Samuel LPC    Focus: TEJAS Activity Group  Number in attendance: 29    Method: Group  Attendance: Present  Participation: Active  Patient Response: Attentive    Topic:  Recovery Analysis Questions    Individual Response to Group: Pt demonstrated Active engagement in activity, as evidenced by their willingness to analyze the questions and give recovery sound answers to the prompts provided.     COLBY Joseph    No

## 2025-02-10 ENCOUNTER — TRANSCRIPTION ENCOUNTER (OUTPATIENT)
Age: 70
End: 2025-02-10

## 2025-02-10 ENCOUNTER — EMERGENCY (EMERGENCY)
Facility: HOSPITAL | Age: 70
LOS: 1 days | Discharge: HOME | End: 2025-02-10
Attending: EMERGENCY MEDICINE
Payer: MEDICARE

## 2025-02-10 VITALS
SYSTOLIC BLOOD PRESSURE: 96 MMHG | WEIGHT: 182.98 LBS | DIASTOLIC BLOOD PRESSURE: 60 MMHG | HEIGHT: 68 IN | HEART RATE: 99 BPM | OXYGEN SATURATION: 96 % | TEMPERATURE: 98 F | RESPIRATION RATE: 18 BRPM

## 2025-02-10 VITALS
TEMPERATURE: 99 F | RESPIRATION RATE: 18 BRPM | DIASTOLIC BLOOD PRESSURE: 67 MMHG | OXYGEN SATURATION: 95 % | SYSTOLIC BLOOD PRESSURE: 107 MMHG | HEART RATE: 85 BPM

## 2025-02-10 DIAGNOSIS — Z94.0 KIDNEY TRANSPLANT STATUS: Chronic | ICD-10-CM

## 2025-02-10 LAB
ALBUMIN SERPL ELPH-MCNC: 3.3 G/DL — LOW (ref 3.5–5)
ALP SERPL-CCNC: 79 U/L — SIGNIFICANT CHANGE UP (ref 40–120)
ALT FLD-CCNC: 28 U/L DA — SIGNIFICANT CHANGE UP (ref 10–60)
ANION GAP SERPL CALC-SCNC: 7 MMOL/L — SIGNIFICANT CHANGE UP (ref 5–17)
APPEARANCE UR: ABNORMAL
AST SERPL-CCNC: 26 U/L — SIGNIFICANT CHANGE UP (ref 10–40)
BACTERIA # UR AUTO: ABNORMAL /HPF
BASOPHILS # BLD AUTO: 0.01 K/UL — SIGNIFICANT CHANGE UP (ref 0–0.2)
BASOPHILS NFR BLD AUTO: 0.1 % — SIGNIFICANT CHANGE UP (ref 0–2)
BILIRUB SERPL-MCNC: 1.2 MG/DL — SIGNIFICANT CHANGE UP (ref 0.2–1.2)
BILIRUB UR-MCNC: ABNORMAL
BUN SERPL-MCNC: 49 MG/DL — HIGH (ref 7–18)
CALCIUM SERPL-MCNC: 8.9 MG/DL — SIGNIFICANT CHANGE UP (ref 8.4–10.5)
CHLORIDE SERPL-SCNC: 110 MMOL/L — HIGH (ref 96–108)
CO2 SERPL-SCNC: 21 MMOL/L — LOW (ref 22–31)
COLOR SPEC: SIGNIFICANT CHANGE UP
COMMENT - URINE: SIGNIFICANT CHANGE UP
CREAT SERPL-MCNC: 2.22 MG/DL — HIGH (ref 0.5–1.3)
DIFF PNL FLD: ABNORMAL
EGFR: 31 ML/MIN/1.73M2 — LOW
EOSINOPHIL # BLD AUTO: 0.01 K/UL — SIGNIFICANT CHANGE UP (ref 0–0.5)
EOSINOPHIL NFR BLD AUTO: 0.1 % — SIGNIFICANT CHANGE UP (ref 0–6)
EPI CELLS # UR: PRESENT
GLUCOSE SERPL-MCNC: 92 MG/DL — SIGNIFICANT CHANGE UP (ref 70–99)
GLUCOSE UR QL: NEGATIVE MG/DL — SIGNIFICANT CHANGE UP
HCT VFR BLD CALC: 44 % — SIGNIFICANT CHANGE UP (ref 39–50)
HGB BLD-MCNC: 14.5 G/DL — SIGNIFICANT CHANGE UP (ref 13–17)
IMM GRANULOCYTES NFR BLD AUTO: 0.9 % — SIGNIFICANT CHANGE UP (ref 0–0.9)
KETONES UR-MCNC: ABNORMAL MG/DL
LEUKOCYTE ESTERASE UR-ACNC: ABNORMAL
LYMPHOCYTES # BLD AUTO: 0.19 K/UL — LOW (ref 1–3.3)
LYMPHOCYTES # BLD AUTO: 2.5 % — LOW (ref 13–44)
MANUAL SMEAR VERIFICATION: SIGNIFICANT CHANGE UP
MCHC RBC-ENTMCNC: 29.2 PG — SIGNIFICANT CHANGE UP (ref 27–34)
MCHC RBC-ENTMCNC: 33 G/DL — SIGNIFICANT CHANGE UP (ref 32–36)
MCV RBC AUTO: 88.5 FL — SIGNIFICANT CHANGE UP (ref 80–100)
MONOCYTES # BLD AUTO: 0.51 K/UL — SIGNIFICANT CHANGE UP (ref 0–0.9)
MONOCYTES NFR BLD AUTO: 6.8 % — SIGNIFICANT CHANGE UP (ref 2–14)
NEUTROPHILS # BLD AUTO: 6.76 K/UL — SIGNIFICANT CHANGE UP (ref 1.8–7.4)
NEUTROPHILS NFR BLD AUTO: 89.6 % — HIGH (ref 43–77)
NITRITE UR-MCNC: NEGATIVE — SIGNIFICANT CHANGE UP
NRBC # BLD: 0 /100 WBCS — SIGNIFICANT CHANGE UP (ref 0–0)
NRBC BLD-RTO: 0 /100 WBCS — SIGNIFICANT CHANGE UP (ref 0–0)
PH UR: 5 — SIGNIFICANT CHANGE UP (ref 5–8)
PLAT MORPH BLD: NORMAL — SIGNIFICANT CHANGE UP
PLATELET # BLD AUTO: 131 K/UL — LOW (ref 150–400)
POTASSIUM SERPL-MCNC: 4.4 MMOL/L — SIGNIFICANT CHANGE UP (ref 3.5–5.3)
POTASSIUM SERPL-SCNC: 4.4 MMOL/L — SIGNIFICANT CHANGE UP (ref 3.5–5.3)
PROT SERPL-MCNC: 6.6 G/DL — SIGNIFICANT CHANGE UP (ref 6–8.3)
PROT UR-MCNC: 300 MG/DL
RBC # BLD: 4.97 M/UL — SIGNIFICANT CHANGE UP (ref 4.2–5.8)
RBC # FLD: 13.2 % — SIGNIFICANT CHANGE UP (ref 10.3–14.5)
RBC BLD AUTO: NORMAL — SIGNIFICANT CHANGE UP
RBC CASTS # UR COMP ASSIST: 40 /HPF — HIGH (ref 0–4)
SODIUM SERPL-SCNC: 138 MMOL/L — SIGNIFICANT CHANGE UP (ref 135–145)
SP GR SPEC: 1.03 — SIGNIFICANT CHANGE UP (ref 1–1.03)
UROBILINOGEN FLD QL: 1 MG/DL — SIGNIFICANT CHANGE UP (ref 0.2–1)
WBC # BLD: 7.55 K/UL — SIGNIFICANT CHANGE UP (ref 3.8–10.5)
WBC # FLD AUTO: 7.55 K/UL — SIGNIFICANT CHANGE UP (ref 3.8–10.5)
WBC UR QL: 95 /HPF — HIGH (ref 0–5)

## 2025-02-10 PROCEDURE — 87086 URINE CULTURE/COLONY COUNT: CPT

## 2025-02-10 PROCEDURE — 87637 SARSCOV2&INF A&B&RSV AMP PRB: CPT

## 2025-02-10 PROCEDURE — 80053 COMPREHEN METABOLIC PANEL: CPT

## 2025-02-10 PROCEDURE — 36415 COLL VENOUS BLD VENIPUNCTURE: CPT

## 2025-02-10 PROCEDURE — 81001 URINALYSIS AUTO W/SCOPE: CPT

## 2025-02-10 PROCEDURE — 87186 SC STD MICRODIL/AGAR DIL: CPT

## 2025-02-10 PROCEDURE — 96374 THER/PROPH/DIAG INJ IV PUSH: CPT

## 2025-02-10 PROCEDURE — 99284 EMERGENCY DEPT VISIT MOD MDM: CPT | Mod: 25

## 2025-02-10 PROCEDURE — 71046 X-RAY EXAM CHEST 2 VIEWS: CPT

## 2025-02-10 PROCEDURE — 99285 EMERGENCY DEPT VISIT HI MDM: CPT

## 2025-02-10 PROCEDURE — 71046 X-RAY EXAM CHEST 2 VIEWS: CPT | Mod: 26

## 2025-02-10 PROCEDURE — 85025 COMPLETE CBC W/AUTO DIFF WBC: CPT

## 2025-02-10 RX ORDER — CEFTRIAXONE 250 MG/1
1000 INJECTION, POWDER, FOR SOLUTION INTRAMUSCULAR; INTRAVENOUS ONCE
Refills: 0 | Status: COMPLETED | OUTPATIENT
Start: 2025-02-10 | End: 2025-02-10

## 2025-02-10 RX ORDER — BACTERIOSTATIC SODIUM CHLORIDE 0.9 %
1000 VIAL (ML) INJECTION ONCE
Refills: 0 | Status: COMPLETED | OUTPATIENT
Start: 2025-02-10 | End: 2025-02-10

## 2025-02-10 RX ADMIN — CEFTRIAXONE 100 MILLIGRAM(S): 250 INJECTION, POWDER, FOR SOLUTION INTRAMUSCULAR; INTRAVENOUS at 23:06

## 2025-02-10 NOTE — ED PROVIDER NOTE - NSFOLLOWUPINSTRUCTIONS_ED_ALL_ED_FT
Your urine showed signs of infection.  You received a dose of IV antibiotics in the emergency department.  Your kidney function has increased since your last visit to the emergency department.  You are recommended to have it rechecked after hydration.  You chose to follow-up with your nephrologist in 2 days.  Please return if you feel worse especially if you develop any vomiting, dizziness, weakness or shortness of breath.  Please see accompanying information especially signs and symptoms to return to the emergency department.    Urinary Tract Infection in Older Adults    WHAT YOU NEED TO KNOW:    A urinary tract infection (UTI) is caused by bacteria that get inside your urinary tract. Your urinary tract includes your kidneys, ureters, bladder, and urethra. A UTI is more common in your lower urinary tract, which includes your bladder and urethra.  Kidney, Ureters, Bladder    DISCHARGE INSTRUCTIONS:    Seek care immediately if:    You become confused or agitated.    You fall down.    You are urinating very little or not at all.    You are vomiting.    You have a high fever with shaking chills.    You have side or back pain that gets worse.  Call your doctor if:    You have a fever.    You are a woman and you have increased white or yellow discharge from your vagina.    You do not feel better after 2 days of taking antibiotics.    You have questions or concerns about your condition or care.  Medicines:    Medicines help treat the bacterial infection or decrease pain and burning when you urinate. You may also need medicines to decrease the urge to urinate often. If you have UTIs often (called recurrent UTIs), you may be given antibiotics to take regularly. You will be given directions for when and how to use antibiotics. The goal is to prevent UTIs but not cause antibiotic resistance by using antibiotics too often.    Take your medicine as directed. Contact your healthcare provider if you think your medicine is not helping or if you have side effects. Tell your provider if you are allergic to any medicine. Keep a list of the medicines, vitamins, and herbs you take. Include the amounts, and when and why you take them. Bring the list or the pill bottles to follow-up visits. Carry your medicine list with you in case of an emergency.  Self-care:    Drink liquids as directed. Liquids can help flush bacteria from your urinary tract. Ask how much liquid to drink each day and which liquids are best for you. You may need to drink more liquids than usual to help flush out the bacteria. Do not drink alcohol, caffeine, and citrus juices. These can irritate your bladder and increase your symptoms.    Apply heat on your abdomen for 20 to 30 minutes every 2 hours for as many days as directed. Heat helps decrease discomfort and pressure in your bladder.  Prevent a UTI:    Urinate when you feel the urge. Do not hold your urine. Bacteria can grow if urine stays in the bladder too long. It may be helpful to urinate at least every 3 to 4 hours.    Urinate after you have sex. This will help flush away bacteria that can enter your urinary tract during sex.    Wear cotton underwear and clothes that are loose. Tight pants and nylon underwear can trap moisture and cause bacteria to grow.    Drink cranberry juice or take cranberry supplements. These may help prevent UTIs. Your healthcare provider can recommend the right juice or supplement for you.    Women should wipe front to back after urinating or having a bowel movement. This may prevent germs from getting into the urinary tract. Do not douche or use feminine deodorants. These can change the chemical balance in your vagina. You may also be given vaginal estrogen medicine. This medicine helps prevent recurrent UTIs in women who have gone through menopause or are in esequiel-menopause.  Follow up with your doctor as directed: Write down your questions so you remember to ask them during your visits.    © [employer] 1973, 2025

## 2025-02-10 NOTE — ED ADULT NURSE NOTE - CAS DISCH TRANSFER METHOD
Patient has a medical condition that requires a higher level of medical care than can be provided at the [facility]. Patient is advised to go to the emergency department following discharge. The patient is stable to take a private auto to the emergency department and does not require EMS transport. Patient aware of the consequences of not presenting to the ED including a worsening of their medication condition and possibly permanent disability/death. Patient possessed decision making capacity.     Private car

## 2025-02-10 NOTE — ED PROVIDER NOTE - CLINICAL SUMMARY MEDICAL DECISION MAKING FREE TEXT BOX
70-year-old male with history of kidney transplant here for fever chills and urinary symptoms.  Concern for UTI.  Less likely viral illness or pneumonia.  Plan to check urinalysis, labs, chest x-ray viral swab and reassess.

## 2025-02-10 NOTE — ED PROVIDER NOTE - OBJECTIVE STATEMENT
70-year-old male Ghanaian-speaking with a history of hypertension, kidney transplant 5 years ago at [place] presenting with fever, chills, dysuria and urinary frequency since this morning.  Patient took Tylenol in the morning.  Last saw a doctor 3 months ago.  Denies any cough, sore throat, chest pain or shortness of breath.  Accompanied by son who he prefers to translate Ghanaian for him.

## 2025-02-10 NOTE — ED ADULT NURSE NOTE - PRO INTERPRETER NEED 2
[facility] Internal Medicine Progress Note    CC:  Patient presents with:  Cough: x 1 week  Sore Throat  Chest Congestion      HPI:   HPI  Both kids are sick  Pt has been congested  PND  Sore throat  Cough, productive  Denies any f/c/n/v/sob/cp She has no cervical adenopathy. Neurological: She is alert and oriented to person, place, and time. Skin: Skin is warm and dry. Psychiatric: Mood and affect normal.   Vitals reviewed.         Assessment and Plan:  Acute maxillary sinusitis, recurr Cambodian

## 2025-02-10 NOTE — ED PROVIDER NOTE - WR INTERPRETATION DATE TIME  1
seen and evaluated by Dr. Monsalve, cardiology, plan to give lasix, potassium, 2nd set if negative dc home for further outpatient work up
11-Feb-2025 00:29

## 2025-02-10 NOTE — ED ADULT NURSE NOTE - CHIEF COMPLAINT QUOTE
Pt reports that  he has  dysuria , frequency urination , burning urination with  Fever  started 3 am today  . H/O ESRD Kidney transplant  4 years ago

## 2025-02-10 NOTE — ED PROVIDER NOTE - PROGRESS NOTE DETAILS
Had conversation with patient nephrologist .  Made her aware of patient's elevated creatinine.  She was in agreement with IV hydration and IV antibiotics.  States that patient can follow-up on Wednesday afternoon in the office.  Recommended patient to get repeat blood work to ensure improvement of creatinine prior to discharge but patient and son are in disagreement and would rather go home and have it rechecked as an outpatient.  Discussed signs and symptoms to return sooner to ED.  Answered questions.

## 2025-02-10 NOTE — ED PROVIDER NOTE - PATIENT PORTAL LINK FT
You can access the FollowMyHealth Patient Portal offered by [facility] by registering at the following website: [email or web address]. By joining [employer]’s FollowMyHealth portal, you will also be able to view your health information using other applications (apps) compatible with our system.

## 2025-02-10 NOTE — ED ADULT TRIAGE NOTE - CHIEF COMPLAINT QUOTE
"Problem: Patient Care Overview  Goal: Plan of Care/Patient Progress Review  /69 (BP Location: Right arm)  Pulse 88  Temp 97.6  F (36.4  C) (Oral)  Resp 20  Ht 1.651 m (5' 5\")  Wt 104.7 kg (230 lb 14.4 oz)  SpO2 95%  BMI 38.42 kg/m2    Neuro: Alert, oriented x3-4, forgetful at times.  Has been intermittently using call light, but also setting off bed alarm. Bed alarm for patient safety.  Cardiac: SR. VSS,afebrile.  Respiratory: Sating low to mid 90s on 40-50% HFNC, ESCOBAR when up to chair and commode.  GI/: Adequate urine output, lasix BID, up to commode. BM X3, two episodes of incontinence.  Diet/appetite: Tolerating clear liquid diet. TF at 50mL/hr (goal), tolerating well via J tube. 40mL water flush q2h. G tube clamped, irrigated with 30mL water, patent.  Activity:  Heavy assist of 1, up to chair and commode. Patient needs frequent guidance on moving from bed to chair or commode.  Pain: At acceptable level on current regimen. PRN oxy x1 this AM and scheduled tylenol and gabapentin given.  Skin: No new deficits noted. All dressings changed this AM, incisions are approximated. Left neck incision with small amount of drainage, MD informed.   LDA's: PIV x2 on right arm, saline locked. Left chest spit fistula, moderate output, tends to be pink tinged from jello patient has eaten.   Plan: Continue with POC. Notify primary team with changes.        " Pt reports that  he has  dysuria , frequency urination , burning urination with  Fever  started 3 am today  . H/O ESRD Kidney transplant  4 years ago

## 2025-02-11 PROBLEM — N18.6 END STAGE RENAL DISEASE: Chronic | Status: ACTIVE | Noted: 2017-09-28

## 2025-02-11 LAB
FLUAV AG NPH QL: SIGNIFICANT CHANGE UP
FLUBV AG NPH QL: SIGNIFICANT CHANGE UP
RSV RNA NPH QL NAA+NON-PROBE: SIGNIFICANT CHANGE UP
SARS-COV-2 RNA SPEC QL NAA+PROBE: SIGNIFICANT CHANGE UP

## 2025-02-11 RX ORDER — CEFUROXIME AXETIL 500 MG
1 TABLET ORAL
Qty: 20 | Refills: 0
Start: 2025-02-11 | End: 2025-02-20

## 2025-02-12 NOTE — ASU DISCHARGE PLAN (ADULT/PEDIATRIC). - C. MAKE IMPORTANT PERSONAL OR BUSINESS DECISIONS
Specialty Pharmacy Patient Management Program  Refill Outreach     Basilia was contacted today regarding refills of their medication(s).    Refill Questions      Flowsheet Row Most Recent Value   Changes to allergies? No   Changes to medications? No   New conditions or infections since last clinic visit No   Unplanned office visit, urgent care, ED, or hospital admission in the last 4 weeks  No   How does patient/caregiver feel medication is working? Excellent   Financial problems or insurance changes  No   Since the previous refill, were any specialty medication doses or scheduled injections missed or delayed?  No   Does this patient require a clinical escalation to a pharmacist? No            Delivery Questions      Flowsheet Row Most Recent Value   Delivery method UPS   Delivery address verified with patient/caregiver? Yes   Delivery address Temporary  [WORK]   Number of medications in delivery 1   Medication(s) being filled and delivered Mepolizumab (Nucala)   Doses left of specialty medications 8   Copay verified? Yes   Copay amount $0.00   Copay form of payment No copayment ($0)   Ship Date 02/12/25   Delivery Date Selection 12/13/25   Signature Required Yes                 Follow-up: 4 week(s)     Lida Lira MA  2/12/2025  10:09 EST     Statement Selected

## 2025-02-13 ENCOUNTER — INPATIENT (INPATIENT)
Facility: HOSPITAL | Age: 70
LOS: 0 days | Discharge: HOME | DRG: 690 | End: 2025-02-13
Attending: INTERNAL MEDICINE | Admitting: INTERNAL MEDICINE
Payer: MEDICARE

## 2025-02-13 VITALS
WEIGHT: 179.9 LBS | HEIGHT: 68 IN | DIASTOLIC BLOOD PRESSURE: 73 MMHG | OXYGEN SATURATION: 97 % | RESPIRATION RATE: 16 BRPM | TEMPERATURE: 98 F | HEART RATE: 75 BPM | SYSTOLIC BLOOD PRESSURE: 113 MMHG

## 2025-02-13 DIAGNOSIS — N39.0 URINARY TRACT INFECTION, SITE NOT SPECIFIED: ICD-10-CM

## 2025-02-13 DIAGNOSIS — Z94.0 KIDNEY TRANSPLANT STATUS: Chronic | ICD-10-CM

## 2025-02-13 LAB
-  AMOXICILLIN/CLAVULANIC ACID: SIGNIFICANT CHANGE UP
-  AMPICILLIN/SULBACTAM: SIGNIFICANT CHANGE UP
-  AMPICILLIN: SIGNIFICANT CHANGE UP
-  AZTREONAM: SIGNIFICANT CHANGE UP
-  CEFAZOLIN: SIGNIFICANT CHANGE UP
-  CEFEPIME: SIGNIFICANT CHANGE UP
-  CEFOXITIN: SIGNIFICANT CHANGE UP
-  CEFTRIAXONE: SIGNIFICANT CHANGE UP
-  CEFUROXIME: SIGNIFICANT CHANGE UP
-  CIPROFLOXACIN: SIGNIFICANT CHANGE UP
-  ERTAPENEM: SIGNIFICANT CHANGE UP
-  GENTAMICIN: SIGNIFICANT CHANGE UP
-  IMIPENEM: SIGNIFICANT CHANGE UP
-  LEVOFLOXACIN: SIGNIFICANT CHANGE UP
-  MEROPENEM: SIGNIFICANT CHANGE UP
-  NITROFURANTOIN: SIGNIFICANT CHANGE UP
-  PIPERACILLIN/TAZOBACTAM: SIGNIFICANT CHANGE UP
-  TOBRAMYCIN: SIGNIFICANT CHANGE UP
-  TRIMETHOPRIM/SULFAMETHOXAZOLE: SIGNIFICANT CHANGE UP
ALBUMIN SERPL ELPH-MCNC: 2.9 G/DL — LOW (ref 3.5–5)
ALP SERPL-CCNC: 118 U/L — SIGNIFICANT CHANGE UP (ref 40–120)
ALT FLD-CCNC: 25 U/L DA — SIGNIFICANT CHANGE UP (ref 10–60)
ANION GAP SERPL CALC-SCNC: 3 MMOL/L — LOW (ref 5–17)
APPEARANCE UR: ABNORMAL
AST SERPL-CCNC: 23 U/L — SIGNIFICANT CHANGE UP (ref 10–40)
BASOPHILS # BLD AUTO: 0.01 K/UL — SIGNIFICANT CHANGE UP (ref 0–0.2)
BASOPHILS NFR BLD AUTO: 0.2 % — SIGNIFICANT CHANGE UP (ref 0–2)
BILIRUB SERPL-MCNC: 0.5 MG/DL — SIGNIFICANT CHANGE UP (ref 0.2–1.2)
BILIRUB UR-MCNC: NEGATIVE — SIGNIFICANT CHANGE UP
BUN SERPL-MCNC: 34 MG/DL — HIGH (ref 7–18)
CALCIUM SERPL-MCNC: 9.1 MG/DL — SIGNIFICANT CHANGE UP (ref 8.4–10.5)
CHLORIDE SERPL-SCNC: 105 MMOL/L — SIGNIFICANT CHANGE UP (ref 96–108)
CO2 SERPL-SCNC: 23 MMOL/L — SIGNIFICANT CHANGE UP (ref 22–31)
COLOR SPEC: YELLOW — SIGNIFICANT CHANGE UP
CREAT SERPL-MCNC: 1.91 MG/DL — HIGH (ref 0.5–1.3)
CULTURE RESULTS: ABNORMAL
DIFF PNL FLD: ABNORMAL
EGFR: 37 ML/MIN/1.73M2 — LOW
EOSINOPHIL # BLD AUTO: 0.01 K/UL — SIGNIFICANT CHANGE UP (ref 0–0.5)
EOSINOPHIL NFR BLD AUTO: 0.2 % — SIGNIFICANT CHANGE UP (ref 0–6)
GLUCOSE SERPL-MCNC: 127 MG/DL — HIGH (ref 70–99)
GLUCOSE UR QL: NEGATIVE MG/DL — SIGNIFICANT CHANGE UP
HCT VFR BLD CALC: 42.2 % — SIGNIFICANT CHANGE UP (ref 39–50)
HGB BLD-MCNC: 14 G/DL — SIGNIFICANT CHANGE UP (ref 13–17)
IMM GRANULOCYTES NFR BLD AUTO: 0.3 % — SIGNIFICANT CHANGE UP (ref 0–0.9)
KETONES UR-MCNC: ABNORMAL MG/DL
LACTATE SERPL-SCNC: 1.2 MMOL/L — SIGNIFICANT CHANGE UP (ref 0.7–2)
LEUKOCYTE ESTERASE UR-ACNC: ABNORMAL
LYMPHOCYTES # BLD AUTO: 0.4 K/UL — LOW (ref 1–3.3)
LYMPHOCYTES # BLD AUTO: 6.3 % — LOW (ref 13–44)
MCHC RBC-ENTMCNC: 28.9 PG — SIGNIFICANT CHANGE UP (ref 27–34)
MCHC RBC-ENTMCNC: 33.2 G/DL — SIGNIFICANT CHANGE UP (ref 32–36)
MCV RBC AUTO: 87.2 FL — SIGNIFICANT CHANGE UP (ref 80–100)
METHOD TYPE: SIGNIFICANT CHANGE UP
MONOCYTES # BLD AUTO: 0.93 K/UL — HIGH (ref 0–0.9)
MONOCYTES NFR BLD AUTO: 14.6 % — HIGH (ref 2–14)
NEUTROPHILS # BLD AUTO: 5.02 K/UL — SIGNIFICANT CHANGE UP (ref 1.8–7.4)
NEUTROPHILS NFR BLD AUTO: 78.4 % — HIGH (ref 43–77)
NITRITE UR-MCNC: NEGATIVE — SIGNIFICANT CHANGE UP
NRBC BLD AUTO-RTO: 0 /100 WBCS — SIGNIFICANT CHANGE UP (ref 0–0)
ORGANISM # SPEC MICROSCOPIC CNT: ABNORMAL
ORGANISM # SPEC MICROSCOPIC CNT: ABNORMAL
PH UR: 5.5 — SIGNIFICANT CHANGE UP (ref 5–8)
PLATELET # BLD AUTO: 152 K/UL — SIGNIFICANT CHANGE UP (ref 150–400)
POTASSIUM SERPL-MCNC: 4.6 MMOL/L — SIGNIFICANT CHANGE UP (ref 3.5–5.3)
POTASSIUM SERPL-SCNC: 4.6 MMOL/L — SIGNIFICANT CHANGE UP (ref 3.5–5.3)
PROT SERPL-MCNC: 7 G/DL — SIGNIFICANT CHANGE UP (ref 6–8.3)
PROT UR-MCNC: 30 MG/DL
RBC # BLD: 4.84 M/UL — SIGNIFICANT CHANGE UP (ref 4.2–5.8)
RBC # FLD: 13.2 % — SIGNIFICANT CHANGE UP (ref 10.3–14.5)
SODIUM SERPL-SCNC: 131 MMOL/L — LOW (ref 135–145)
SP GR SPEC: 1.02 — SIGNIFICANT CHANGE UP (ref 1–1.03)
SPECIMEN SOURCE: SIGNIFICANT CHANGE UP
UROBILINOGEN FLD QL: 0.2 MG/DL — SIGNIFICANT CHANGE UP (ref 0.2–1)
WBC # BLD: 6.39 K/UL — SIGNIFICANT CHANGE UP (ref 3.8–10.5)
WBC # FLD AUTO: 6.39 K/UL — SIGNIFICANT CHANGE UP (ref 3.8–10.5)

## 2025-02-13 PROCEDURE — 36415 COLL VENOUS BLD VENIPUNCTURE: CPT

## 2025-02-13 PROCEDURE — 81001 URINALYSIS AUTO W/SCOPE: CPT

## 2025-02-13 PROCEDURE — 80053 COMPREHEN METABOLIC PANEL: CPT

## 2025-02-13 PROCEDURE — 83605 ASSAY OF LACTIC ACID: CPT

## 2025-02-13 PROCEDURE — 99285 EMERGENCY DEPT VISIT HI MDM: CPT | Mod: 25

## 2025-02-13 PROCEDURE — 87086 URINE CULTURE/COLONY COUNT: CPT

## 2025-02-13 PROCEDURE — 96374 THER/PROPH/DIAG INJ IV PUSH: CPT

## 2025-02-13 PROCEDURE — 99284 EMERGENCY DEPT VISIT MOD MDM: CPT

## 2025-02-13 PROCEDURE — 87040 BLOOD CULTURE FOR BACTERIA: CPT

## 2025-02-13 PROCEDURE — 85025 COMPLETE CBC W/AUTO DIFF WBC: CPT

## 2025-02-13 RX ORDER — CEFPODOXIME PROXETIL 200 MG
1 TABLET ORAL
Qty: 14 | Refills: 0
Start: 2025-02-13 | End: 2025-02-19

## 2025-02-13 RX ORDER — CEFTRIAXONE 250 MG/1
1000 INJECTION, POWDER, FOR SOLUTION INTRAMUSCULAR; INTRAVENOUS ONCE
Refills: 0 | Status: COMPLETED | OUTPATIENT
Start: 2025-02-13 | End: 2025-02-13

## 2025-02-13 RX ADMIN — CEFTRIAXONE 100 MILLIGRAM(S): 250 INJECTION, POWDER, FOR SOLUTION INTRAMUSCULAR; INTRAVENOUS at 19:07

## 2025-02-13 NOTE — ED PROVIDER NOTE - OBJECTIVE STATEMENT
Patient is a 70-year-old gentleman with a past medical history of hypertension, CAD status post stents, BPH, renal transplant status post IgA nephropathy who presents to the ED because of urinary tract infection.  Started having dysuria and was found to have a UTI on Monday.  Started on cefuroxime, and discharged home.  Patient still having fever and dysuria and his nephrologist told him to come to the ED for IV antibiotics.

## 2025-02-13 NOTE — ED ADULT NURSE NOTE - CINV DISCH MEDS REVIEWED YN
Problem: METABOLIC, FLUID AND ELECTROLYTES - ADULT  Goal: Electrolytes maintained within normal limits  Description: INTERVENTIONS:  - Monitor labs and assess patient for signs and symptoms of electrolyte imbalances  - Administer electrolyte replacement as ordered  - Monitor response to electrolyte replacements, including repeat lab results as appropriate  - Instruct patient on fluid and nutrition as appropriate  Outcome: Progressing     Problem: Prexisting or High Potential for Compromised Skin Integrity  Goal: Skin integrity is maintained or improved  Description: INTERVENTIONS:  - Identify patients at risk for skin breakdown  - Assess and monitor skin integrity  - Assess and monitor nutrition and hydration status  - Monitor labs   - Assess for incontinence   - Turn and reposition patient  - Assist with mobility/ambulation  - Relieve pressure over bony prominences  - Avoid friction and shearing  - Provide appropriate hygiene as needed including keeping skin clean and dry  - Evaluate need for skin moisturizer/barrier cream  - Collaborate with interdisciplinary team   - Patient/family teaching  - Consider wound care consult   Outcome: Progressing Yes

## 2025-02-13 NOTE — ED ADULT TRIAGE NOTE - CHIEF COMPLAINT QUOTE
Burning on urination ,fever at night ,diagnosed with UTI and on oral antibiotic 2 nd day today ,h/o laine kidney failure ,had l renal transplant 4 years ago

## 2025-02-13 NOTE — ED PROVIDER NOTE - PATIENT PORTAL LINK FT
You can access the FollowMyHealth Patient Portal offered by [facility] by registering at the following website: [email or web address] By joining [facility]’s FollowMyHealth portal, you will also be able to view your health information using other applications (apps) compatible with our system.

## 2025-02-13 NOTE — ED ADULT NURSE NOTE - IS THE PATIENT ABLE TO BE SCREENED?
For information on Fall & Injury Prevention, visit: [email or web address] OR  [email or web address] OR  https://www.cdc.gov/steadi/patient.html Yes

## 2025-02-13 NOTE — ED PROVIDER NOTE - PROGRESS NOTE DETAILS
Pt has uti, labs wnl. Pt recommended to be admitted, but declines, wishes to get 1 dose of iv abx and d/c to f/u w his nephrologist outpatient. Pt and son have capacity to decline admission.

## 2025-02-13 NOTE — ED ADULT NURSE NOTE - OBJECTIVE STATEMENT
Pt c/o frequent urination and fever. Reports currently on antibiotics for UTI and hx of kidney transplant. Denies any pain.

## 2025-02-13 NOTE — ED ADULT NURSE NOTE - COVID-19 ORDERING FACILITY
THE PATIENT WAS SEEN AND EXAMINED BY ME WITH THE HOUSESTAFF AND STROKE TEAM DURING MORNING ROUNDS.   HPI:  58 year old male pmhx of htn coming in with 1 day of dizziness prior to admission. Patient presented to ED when the dizziness persisted and was found to have cerebellar hemorrhage and 4th ventricle IVH (Vermian Hemorrhage) for which he was transferred to [facility].  Patient stated he takes no medication and has no pmhx but reportedly has hx of htn.Denied generalized weakness, and nausea. No fall or recent trauma. No numbness, tingling, weakness. No antiplatelets or anticoagulants. Patient  denied and headache, n/v, blurry vision, double vision, numbness or weakness.       SUBJECTIVE: No events overnight.  No new neurologic complaints.      acetaminophen   Tablet 650 milliGRAM(s) Oral every 6 hours PRN  acetaminophen   Tablet. 650 milliGRAM(s) Oral every 6 hours PRN  aluminum hydroxide/magnesium hydroxide/simethicone Suspension 30 milliLiter(s) Oral every 6 hours PRN  amLODIPine   Tablet 10 milliGRAM(s) Oral daily  atorvastatin 20 milliGRAM(s) Oral at bedtime  dextrose 50% Injectable 12.5 Gram(s) IV Push once  dextrose 50% Injectable 25 Gram(s) IV Push once  dextrose 50% Injectable 25 Gram(s) IV Push once  dextrose Gel 1 Dose(s) Oral once PRN  docusate sodium 100 milliGRAM(s) Oral daily  enoxaparin Injectable 40 milliGRAM(s) SubCutaneous <User Schedule>  glucagon  Injectable 1 milliGRAM(s) IntraMuscular once PRN  influenza   Vaccine 0.5 milliLiter(s) IntraMuscular once  insulin lispro (HumaLOG) corrective regimen sliding scale   SubCutaneous Before meals and at bedtime  insulin NPH human recombinant 4 Unit(s) SubCutaneous Before meals and at bedtime  lisinopril 40 milliGRAM(s) Oral daily  metoprolol succinate ER 25 milliGRAM(s) Oral daily  senna 2 Tablet(s) Oral at bedtime  triamcinolone 0.1% Ointment 1 Application(s) Topical two times a day      PHYSICAL EXAM:   Vital Signs Last 24 Hrs  T(C): 36.7 (02 Jan 2018 07:28), Max: 36.8 (02 Jan 2018 00:00)  T(F): 98 (02 Jan 2018 07:28), Max: 98.3 (02 Jan 2018 00:00)  HR: 85 (02 Jan 2018 12:53) (61 - 85)  BP: 155/102 (02 Jan 2018 12:53) (124/82 - 162/92)  BP(mean): --  RR: 18 (02 Jan 2018 07:28) (18 - 18)  SpO2: 97% (02 Jan 2018 12:24) (96% - 99%)    General: Resting in bed. No acute distress  HEENT: PERRL,  EOM intact, visual fields full    NEUROLOGICAL EXAM:   Mental status: Cantonese speaking, alert, oriented x3. Able to follow simple commands. Speech is fluent but with mild dysarthria   Cranial Nerves: Left facial droop, no nystagmus, no dysarthria, tongue is midline.  Motor exam: Normal tone, no drift. Strength 5/5 throughout.  Sensation: Intact to light touch   Coordination/ Gait: Bilateral ataxia/dysmetria, worse on the left, Ataxic gait with mild truncal ataxia     LABS:         Hemoglobin A1C, Whole Blood: 6.7 % (12-17 @ 06:40)      IMAGING: Reviewed by me.     CT BRAIN (12/19/2017)  History: Follow-up parenchymal hemorrhage. Multiple axial sections were performed from base of skull to vertex without contrast enhancement. This exam is compared with prior noncontrast head CT performed on December 18, 2017. Parenchymal hemorrhage and surrounding edema is again seen in the   posterior fossa midline. This area. Or hemorrhage measures approximately .6 x 3.0 cm and previously measured approximately 3.4 x 3.1 cm. Mass effect on the fourth ventricles again seen and unchanged. The size and configuration of the prominent lateral third ventricles are again seen and unchanged.  Periventricular white matter lucency is again seen and unchanged. Evaluation of the osseous structures with the appropriate window appears unremarkable. The visualized paranasal sinuses mastoid and middle ear regions appear clear    CT Angio Neck w/ IV Cont (12.15.17) CT BRAIN: Acute intra-axial hemorrhage in the cerebellar vermis causing mass effect on the fourth ventricle. Mild prominence of the third and lateral ventricles. Heterogeneous calcified mass in the extracalvarial soft tissues of the right occipital bone.  CTA BRAIN: Patent intracranial circulation. No flow-limiting stenosis or occlusion.   CTA NECK: Patent cervical vasculature. No flow limiting stenosis or occlusion.   Cerebral Angiogram (12.20.17) : Preliminary report normal pial and dural surfaces. No aneurysms. No venous occlusions.     MR Head No Cont (12.22.17)   As described on patient's CT midline cerebellar hemorrhage   with mass effect on the fourth ventricle and some mild rostral   hydrocephalus suggested with mild dilatation of the temporal horns and   the lateral ventricles. There is fairly prominent microvascular ischemic   type changes identified given the patient's age. Punctate focus of signal   hyperintensity on diffusion in the left parietal occipital region   consistent with an acute punctate focus of infarct.   CT Head No Cont (12.28.17)  Resolving vermian hemorrhage compared with 12/19/2017. No change in mass   effect on the fourth ventricle or rostral hydrocephalus. THE PATIENT WAS SEEN AND EXAMINED BY ME WITH THE HOUSESTAFF AND STROKE TEAM DURING MORNING ROUNDS.   HPI:  58 year old male pmhx of htn coming in with 1 day of dizziness prior to admission. Patient presented to ED when the dizziness persisted and was found to have cerebellar hemorrhage and 4th ventricle IVH (Vermian Hemorrhage) for which he was transferred to [facility].  Patient stated he takes no medication and has no pmhx but reportedly has hx of htn.Denied generalized weakness, and nausea. No fall or recent trauma. No numbness, tingling, weakness. No antiplatelets or anticoagulants. Patient  denied and headache, n/v, blurry vision, double vision, numbness or weakness.     SUBJECTIVE: No events overnight.  No new neurologic complaints.      acetaminophen   Tablet 650 milliGRAM(s) Oral every 6 hours PRN  acetaminophen   Tablet. 650 milliGRAM(s) Oral every 6 hours PRN  aluminum hydroxide/magnesium hydroxide/simethicone Suspension 30 milliLiter(s) Oral every 6 hours PRN  amLODIPine   Tablet 10 milliGRAM(s) Oral daily  atorvastatin 20 milliGRAM(s) Oral at bedtime  dextrose 50% Injectable 12.5 Gram(s) IV Push once  dextrose 50% Injectable 25 Gram(s) IV Push once  dextrose 50% Injectable 25 Gram(s) IV Push once  dextrose Gel 1 Dose(s) Oral once PRN  docusate sodium 100 milliGRAM(s) Oral daily  enoxaparin Injectable 40 milliGRAM(s) SubCutaneous <User Schedule>  glucagon  Injectable 1 milliGRAM(s) IntraMuscular once PRN  influenza   Vaccine 0.5 milliLiter(s) IntraMuscular once  insulin lispro (HumaLOG) corrective regimen sliding scale   SubCutaneous Before meals and at bedtime  insulin NPH human recombinant 4 Unit(s) SubCutaneous Before meals and at bedtime  lisinopril 40 milliGRAM(s) Oral daily  metoprolol succinate ER 25 milliGRAM(s) Oral daily  senna 2 Tablet(s) Oral at bedtime  triamcinolone 0.1% Ointment 1 Application(s) Topical two times a day    PHYSICAL EXAM:   Vital Signs Last 24 Hrs  T(C): 36.7 (02 Jan 2018 07:28), Max: 36.8 (02 Jan 2018 00:00)  T(F): 98 (02 Jan 2018 07:28), Max: 98.3 (02 Jan 2018 00:00)  HR: 85 (02 Jan 2018 12:53) (61 - 85)  BP: 155/102 (02 Jan 2018 12:53) (124/82 - 162/92)  BP(mean): --  RR: 18 (02 Jan 2018 07:28) (18 - 18)  SpO2: 97% (02 Jan 2018 12:24) (96% - 99%)    General: Resting in bed. No acute distress  HEENT: PERRL,  EOM intact, visual fields full    NEUROLOGICAL EXAM:   Mental status: Cantonese speaking, alert, oriented x3. Able to follow simple commands. Speech is fluent but with mild dysarthria   Cranial Nerves: Left facial droop, no nystagmus, no dysarthria, tongue is midline.  Motor exam: Normal tone, no drift. Strength 5/5 throughout.  Sensation: Intact to light touch   Coordination/ Gait: Bilateral ataxia/dysmetria, worse on the left, Ataxic gait with mild truncal ataxia     LABS:  Hemoglobin A1C, Whole Blood: 6.7 % (12-17 @ 06:40)    IMAGING: Reviewed by me.     CT BRAIN (12/19/2017)  History: Follow-up parenchymal hemorrhage. Multiple axial sections were performed from base of skull to vertex without contrast enhancement. This exam is compared with prior noncontrast head CT performed on December 18, 2017. Parenchymal hemorrhage and surrounding edema is again seen in the   posterior fossa midline. This area. Or hemorrhage measures approximately .6 x 3.0 cm and previously measured approximately 3.4 x 3.1 cm. Mass effect on the fourth ventricles again seen and unchanged. The size and configuration of the prominent lateral third ventricles are again seen and unchanged.  Periventricular white matter lucency is again seen and unchanged. Evaluation of the osseous structures with the appropriate window appears unremarkable. The visualized paranasal sinuses mastoid and middle ear regions appear clear    CT Angio Neck w/ IV Cont (12.15.17) CT BRAIN: Acute intra-axial hemorrhage in the cerebellar vermis causing mass effect on the fourth ventricle. Mild prominence of the third and lateral ventricles. Heterogeneous calcified mass in the extracalvarial soft tissues of the right occipital bone.  CTA BRAIN: Patent intracranial circulation. No flow-limiting stenosis or occlusion.   CTA NECK: Patent cervical vasculature. No flow limiting stenosis or occlusion.   Cerebral Angiogram (12.20.17) : Preliminary report normal pial and dural surfaces. No aneurysms. No venous occlusions.     MR Head No Cont (12.22.17)   As described on patient's CT midline cerebellar hemorrhage   with mass effect on the fourth ventricle and some mild rostral   hydrocephalus suggested with mild dilatation of the temporal horns and   the lateral ventricles. There is fairly prominent microvascular ischemic   type changes identified given the patient's age. Punctate focus of signal   hyperintensity on diffusion in the left parietal occipital region   consistent with an acute punctate focus of infarct.   CT Head No Cont (12.28.17)  Resolving vermian hemorrhage compared with 12/19/2017. No change in mass   effect on the fourth ventricle or rostral hydrocephalus. [facility]

## 2025-02-13 NOTE — ED ADULT NURSE NOTE - NS ED NOTE  TALK SOMEONE YN
[facility] ICU Progress Note Name: Tawana Elizalde : 1966 MRN: [id number] Date: 2018 1:06 PM 
  
[x]I have reviewed the flowsheet and previous days notes. Events overnight reviewed and discussed with nursing staff. Vital signs and records reviewed. HPI 
 
47 y.o. female with PMHx significant for HTN, DM, PVD and CAD who presented with L leg pain. She had recently underwent atherectomy and PTA of L leg. She was placed on heparin and had a L fem pop bypass on . Her post surgical course was been complicated by post op hypotension. Subjective: 
18 No new events overnight. Off Levo since yesterday HD stable ROS:A comprehensive review of systems was negative except for that written in the HPI. Medication Review: · Pressors - none · Sedation - none · Antibiotics - Rocephin/ Levaquin · Pain - prn dilaudid · GI/ DVT - SQH 
· Others (other gtts) Safety Bundles: Electrolyte Replacement Protocol Vital Signs:   
Visit Vitals BP 95/75 Pulse 82 Temp 98.1 °F (36.7 °C) Resp 20 Ht 5' 5\" (1.651 m) Wt 76.2 kg (167 lb 15.9 oz) SpO2 99% BMI 27.96 kg/m² O2 Device: Nasal cannula O2 Flow Rate (L/min): 3 l/min Temp (24hrs), Av.4 °F (36.9 °C), Min:97.5 °F (36.4 °C), Max:99 °F (37.2 °C) Intake/Output:  
Last shift:      No intake/output data recorded. Last 3 shifts:  1901 -  0700 In: 684.6 [P.O.:420; I.V.:264.6] Out: 280 [Urine:250; Drains:30] Intake/Output Summary (Last 24 hours) at 2018 0148 Last data filed at 2018 0400 Gross per 24 hour Intake 246.8 ml Output 250 ml Net -3.2 ml Physical Exam: 
 
General: Awake, alert, NAD HEENT:  Anicteric sclerae; pink palpebral conjunctivae; mucosa moist 
Resp:  Clear bilaterally to auscultation; Symmetrical chest expansion, no accessory muscle use; good airway entry; no rales/ wheezing/ rhonchi noted CV:  S1, S2 present; NSR 
GI:  Abdomen soft, non-tender; (+) active bowel sounds Extremities:  +2 pulses on all extremities; no edema/ cyanosis/ clubbing noted; normal capillary refill; BLE surgical wounds clean dry and intact Skin:  Warm; no rashes/ lesions noted Neurologic:  Non-focal; flat affect; oriented x 3 Devices: PICC 
 
DATA:  
 
Current Facility-Administered Medications Medication Dose Route Frequency  NOREPINephrine (LEVOPHED) 16 mg in dextrose 5% 250 mL infusion  2-16 mcg/min IntraVENous TITRATE  levoFLOXacin (LEVAQUIN) tablet 750 mg  750 mg Oral Q24H  
 heparin (porcine) injection 5,000 Units  5,000 Units SubCUTAneous Q8H  
 senna-docusate (PERICOLACE) 8.6-50 mg per tablet 1 Tab  1 Tab Oral DAILY  bacitracin 500 unit/gram packet 1 Packet  1 Packet Topical PRN  
 sodium chloride (NS) flush 10-30 mL  10-30 mL InterCATHeter PRN  
 sodium chloride (NS) flush 10 mL  10 mL InterCATHeter Q24H  
 sodium chloride (NS) flush 10 mL  10 mL InterCATHeter PRN  
 sodium chloride (NS) flush 10-40 mL  10-40 mL InterCATHeter Q8H  
 sodium chloride (NS) flush 20 mL  20 mL InterCATHeter Q24H  
 alteplase (CATHFLO) 1 mg in sterile water (preservative free) 1 mL injection  1 mg InterCATHeter PRN  
 vasopressin (VASOSTRICT) 20 Units in 0.9% sodium chloride 100 mL infusion  0.04 Units/min IntraVENous CONTINUOUS  
 0.9% sodium chloride infusion 250 mL  250 mL IntraVENous PRN  
 0.9% sodium chloride infusion 250 mL  250 mL IntraVENous PRN  
 insulin lispro (HUMALOG) injection   SubCUTAneous AC&HS  
 0.9% sodium chloride infusion 250 mL  250 mL IntraVENous PRN  
 cefTRIAXone (ROCEPHIN) 1 g in 0.9% sodium chloride (MBP/ADV) 50 mL MBP  1 g IntraVENous Q24H  
 HYDROmorphone (DILAUDID) syringe 1 mg  1 mg IntraVENous Q2H PRN  
 acetaminophen (TYLENOL) SR tablet 650 mg  650 mg Oral Q6H PRN  
 influenza vaccine 2018-19 (6 mos+)(PF) (FLUARIX QUAD/FLULAVAL QUAD) injection 0.5 mL  0.5 mL IntraMUSCular PRIOR TO DISCHARGE  ondansetron (ZOFRAN) injection 4 mg  4 mg IntraVENous Q4H PRN  
 morphine injection 4 mg  4 mg IntraVENous Q4H PRN  
 naloxone (NARCAN) injection 0.4 mg  0.4 mg IntraVENous PRN  potassium chloride (K-DUR, KLOR-CON) SR tablet 40 mEq  40 mEq Oral DAILY  glucose chewable tablet 16 g  4 Tab Oral PRN  
 glucagon (GLUCAGEN) injection 1 mg  1 mg IntraMUSCular PRN  
 dextrose (D50) infusion 12.5-25 g  25-50 mL IntraVENous PRN  
 atorvastatin (LIPITOR) tablet 80 mg  80 mg Oral DAILY  clopidogrel (PLAVIX) tablet 75 mg  75 mg Oral DAILY  oxyCODONE-acetaminophen (PERCOCET) 5-325 mg per tablet 1-2 Tab  1-2 Tab Oral Q6H PRN  
 QUEtiapine SR (SEROquel XR) tablet 300 mg  300 mg Oral QHS  albuterol (PROVENTIL VENTOLIN) nebulizer solution 2.5 mg  2.5 mg Nebulization Q4H PRN Labs: Results:  
   
Chemistry Recent Labs  
  12/28/18 
0500 12/27/18 
0549 12/26/18 
7555 GLU 86 128* 120*  137 137  
K 4.5 4.1 4.0  
 103 103 CO2 26 25 26 BUN 13 11 9 CREA 0.50* 0.58* 0.63  
CA 7.9* 7.9* 8.1* AGAP 6 9 8 BUCR 26* 19 14 AP 78 71 70 TP 6.4 6.6 7.2 ALB 2.2* 2.3* 2.5*  
GLOB 4.2* 4.3* 4.7* AGRAT 0.5* 0.5* 0.5* CBC w/Diff Recent Labs  
  12/28/18 
0500 12/27/18 
0549 12/26/18 
0315 WBC 6.8 9.4 7.3 RBC 3.49* 3.78* 3.82* HGB 7.6* 8.4* 8.7* HCT 25.3* 26.9* 27.5*  
 346 268 GRANS 69 71 69 LYMPH 18* 19* 19* EOS 4 1 2 Coagulation No results for input(s): PTP, INR, APTT in the last 72 hours. No lab exists for component: INREXT, INREXT Liver Enzymes Recent Labs  
  12/28/18 
0500 TP 6.4 ALB 2.2* AP 78 SGOT 80* ABG No results found for: PH, PHI, PCO2, PCO2I, PO2, PO2I, HCO3, HCO3I, FIO2, FIO2I Microbiology No results for input(s): CULT in the last 72 hours. Telemetry: [x]Sinus []A-flutter []Paced []A-fib []Multiple PVCs Imaging: CXR Results  (Last 48 hours) None CT Results  (Last 48 hours) None IMPRESSION:  
· PVD s/p L fem pop bypass · Hypotension- remains on Levo · Anemia Hx · CAD · DM · Depression PLAN:  
· Resp - Stable on 3L NC. O2 goal > 90. HOB > 30. Pulmonary hygiene. PRN albuterol. · ID - Afebrile and aleukocytosis. Trend temp and WBC curve. BCx NGTD. Continue ABx for 7 day course. Monitor for signs of infection. · CVS - HD stable. Plavix, lipitor. Continue to monitor HD status. · Heme/onc -  Daily CBC. Monitor H/H and platelets. Monitor for signs of bleeding. · Metabolic - Daily CMP. Trend and replace electrolytes per replacement protocol. · Renal - Trend renal indices. Monitor UOP. · Endocrine - SSI, BS ACHS. Avoid hypoglycemia. · Neuro/ Pain/ Sedation - Monitor neuro and neurovascular status. · GI - Diabetic diet. Bowel regimen. · Prophylaxis - DVT, GI- SQH 
· PT/OT · Discussed in interdisciplinary rounds · Code status- FULL [x]See my orders for details My assessment/plan was discussed with: 
[x]nursing []PT/OT   
[]respiratory therapy [x]Dr. Edward Sandoval  
[]family [] Gaetano Herron NP 
 No

## 2025-03-28 ENCOUNTER — INPATIENT (INPATIENT)
Facility: HOSPITAL | Age: 70
LOS: 3 days | Discharge: HOME | DRG: 690 | End: 2025-04-01
Attending: STUDENT IN AN ORGANIZED HEALTH CARE EDUCATION/TRAINING PROGRAM | Admitting: STUDENT IN AN ORGANIZED HEALTH CARE EDUCATION/TRAINING PROGRAM
Payer: MEDICARE

## 2025-03-28 VITALS
TEMPERATURE: 98 F | HEART RATE: 94 BPM | HEIGHT: 68 IN | DIASTOLIC BLOOD PRESSURE: 58 MMHG | RESPIRATION RATE: 14 BRPM | OXYGEN SATURATION: 99 % | SYSTOLIC BLOOD PRESSURE: 106 MMHG | WEIGHT: 177.03 LBS

## 2025-03-28 DIAGNOSIS — Z94.0 KIDNEY TRANSPLANT STATUS: Chronic | ICD-10-CM

## 2025-03-28 DIAGNOSIS — N39.0 URINARY TRACT INFECTION, SITE NOT SPECIFIED: ICD-10-CM

## 2025-03-28 LAB
ALBUMIN SERPL ELPH-MCNC: 2.9 G/DL — LOW (ref 3.5–5)
ALP SERPL-CCNC: 70 U/L — SIGNIFICANT CHANGE UP (ref 40–120)
ALT FLD-CCNC: 20 U/L DA — SIGNIFICANT CHANGE UP (ref 10–60)
ANION GAP SERPL CALC-SCNC: 6 MMOL/L — SIGNIFICANT CHANGE UP (ref 5–17)
APPEARANCE UR: ABNORMAL
AST SERPL-CCNC: 17 U/L — SIGNIFICANT CHANGE UP (ref 10–40)
BASOPHILS # BLD AUTO: 0.03 K/UL — SIGNIFICANT CHANGE UP (ref 0–0.2)
BASOPHILS NFR BLD AUTO: 0.2 % — SIGNIFICANT CHANGE UP (ref 0–2)
BILIRUB SERPL-MCNC: 0.8 MG/DL — SIGNIFICANT CHANGE UP (ref 0.2–1.2)
BILIRUB UR-MCNC: NEGATIVE — SIGNIFICANT CHANGE UP
BUN SERPL-MCNC: 45 MG/DL — HIGH (ref 7–18)
CALCIUM SERPL-MCNC: 8.7 MG/DL — SIGNIFICANT CHANGE UP (ref 8.4–10.5)
CHLORIDE SERPL-SCNC: 110 MMOL/L — HIGH (ref 96–108)
CO2 SERPL-SCNC: 21 MMOL/L — LOW (ref 22–31)
COLOR SPEC: YELLOW — SIGNIFICANT CHANGE UP
CREAT SERPL-MCNC: 2.1 MG/DL — HIGH (ref 0.5–1.3)
DIFF PNL FLD: ABNORMAL
EGFR: 33 ML/MIN/1.73M2 — LOW
EGFR: 33 ML/MIN/1.73M2 — LOW
EOSINOPHIL # BLD AUTO: 0.03 K/UL — SIGNIFICANT CHANGE UP (ref 0–0.5)
EOSINOPHIL NFR BLD AUTO: 0.2 % — SIGNIFICANT CHANGE UP (ref 0–6)
GLUCOSE SERPL-MCNC: 114 MG/DL — HIGH (ref 70–99)
GLUCOSE UR QL: NEGATIVE MG/DL — SIGNIFICANT CHANGE UP
HCT VFR BLD CALC: 37.1 % — LOW (ref 39–50)
HGB BLD-MCNC: 12.1 G/DL — LOW (ref 13–17)
IMM GRANULOCYTES NFR BLD AUTO: 0.5 % — SIGNIFICANT CHANGE UP (ref 0–0.9)
KETONES UR-MCNC: ABNORMAL MG/DL
LACTATE SERPL-SCNC: 0.8 MMOL/L — SIGNIFICANT CHANGE UP (ref 0.7–2)
LEUKOCYTE ESTERASE UR-ACNC: ABNORMAL
LYMPHOCYTES # BLD AUTO: 0.55 K/UL — LOW (ref 1–3.3)
LYMPHOCYTES # BLD AUTO: 2.9 % — LOW (ref 13–44)
MCHC RBC-ENTMCNC: 28.9 PG — SIGNIFICANT CHANGE UP (ref 27–34)
MCHC RBC-ENTMCNC: 32.6 G/DL — SIGNIFICANT CHANGE UP (ref 32–36)
MCV RBC AUTO: 88.5 FL — SIGNIFICANT CHANGE UP (ref 80–100)
MONOCYTES # BLD AUTO: 1.45 K/UL — HIGH (ref 0–0.9)
MONOCYTES NFR BLD AUTO: 7.8 % — SIGNIFICANT CHANGE UP (ref 2–14)
NEUTROPHILS # BLD AUTO: 16.49 K/UL — HIGH (ref 1.8–7.4)
NEUTROPHILS NFR BLD AUTO: 88.4 % — HIGH (ref 43–77)
NITRITE UR-MCNC: NEGATIVE — SIGNIFICANT CHANGE UP
NRBC BLD AUTO-RTO: 0 /100 WBCS — SIGNIFICANT CHANGE UP (ref 0–0)
PH UR: 5 — SIGNIFICANT CHANGE UP (ref 5–8)
PLATELET # BLD AUTO: 198 K/UL — SIGNIFICANT CHANGE UP (ref 150–400)
POTASSIUM SERPL-MCNC: 4.1 MMOL/L — SIGNIFICANT CHANGE UP (ref 3.5–5.3)
POTASSIUM SERPL-SCNC: 4.1 MMOL/L — SIGNIFICANT CHANGE UP (ref 3.5–5.3)
PROT SERPL-MCNC: 6.4 G/DL — SIGNIFICANT CHANGE UP (ref 6–8.3)
PROT UR-MCNC: 100 MG/DL
RBC # BLD: 4.19 M/UL — LOW (ref 4.2–5.8)
RBC # FLD: 13.6 % — SIGNIFICANT CHANGE UP (ref 10.3–14.5)
SODIUM SERPL-SCNC: 137 MMOL/L — SIGNIFICANT CHANGE UP (ref 135–145)
SP GR SPEC: 1.02 — SIGNIFICANT CHANGE UP (ref 1–1.03)
UROBILINOGEN FLD QL: 1 MG/DL — SIGNIFICANT CHANGE UP (ref 0.2–1)
WBC # BLD: 18.65 K/UL — HIGH (ref 3.8–10.5)
WBC # FLD AUTO: 18.65 K/UL — HIGH (ref 3.8–10.5)

## 2025-03-28 PROCEDURE — 71045 X-RAY EXAM CHEST 1 VIEW: CPT | Mod: 26

## 2025-03-28 PROCEDURE — 99223 1ST HOSP IP/OBS HIGH 75: CPT

## 2025-03-28 PROCEDURE — 99285 EMERGENCY DEPT VISIT HI MDM: CPT

## 2025-03-28 RX ORDER — INFLUENZA A VIRUS A/IDAHO/07/2018 (H1N1) ANTIGEN (MDCK CELL DERIVED, PROPIOLACTONE INACTIVATED, INFLUENZA A VIRUS A/INDIANA/08/2018 (H3N2) ANTIGEN (MDCK CELL DERIVED, PROPIOLACTONE INACTIVATED), INFLUENZA B VIRUS B/SINGAPORE/INFTT-16-0610/2016 ANTIGEN (MDCK CELL DERIVED, PROPIOLACTONE INACTIVATED), INFLUENZA B VIRUS B/IOWA/06/2017 ANTIGEN (MDCK CELL DERIVED, PROPIOLACTONE INACTIVATED) 15; 15; 15; 15 UG/.5ML; UG/.5ML; UG/.5ML; UG/.5ML
0.5 INJECTION, SUSPENSION INTRAMUSCULAR ONCE
Refills: 0 | Status: DISCONTINUED | OUTPATIENT
Start: 2025-03-28 | End: 2025-04-01

## 2025-03-28 RX ORDER — CEFTRIAXONE 500 MG/1
1000 INJECTION, POWDER, FOR SOLUTION INTRAMUSCULAR; INTRAVENOUS ONCE
Refills: 0 | Status: COMPLETED | OUTPATIENT
Start: 2025-03-28 | End: 2025-03-28

## 2025-03-28 RX ORDER — ACETAMINOPHEN 500 MG/5ML
650 LIQUID (ML) ORAL ONCE
Refills: 0 | Status: COMPLETED | OUTPATIENT
Start: 2025-03-28 | End: 2025-03-28

## 2025-03-28 RX ORDER — TACROLIMUS 0.5 MG/1
2 CAPSULE ORAL
Refills: 0 | DISCHARGE

## 2025-03-28 RX ORDER — LABETALOL HYDROCHLORIDE 200 MG/1
1 TABLET, FILM COATED ORAL
Refills: 0 | DISCHARGE

## 2025-03-28 RX ORDER — ACETAMINOPHEN 500 MG/5ML
650 LIQUID (ML) ORAL EVERY 6 HOURS
Refills: 0 | Status: DISCONTINUED | OUTPATIENT
Start: 2025-03-28 | End: 2025-04-01

## 2025-03-28 RX ORDER — MYCOPHENOLATE MOFETIL 500 MG/1
2 TABLET, FILM COATED ORAL
Refills: 0 | DISCHARGE

## 2025-03-28 RX ORDER — ENTECAVIR 0.5 MG/1
1 TABLET ORAL
Refills: 0 | DISCHARGE

## 2025-03-28 RX ORDER — SODIUM CHLORIDE 9 G/1000ML
1000 INJECTION, SOLUTION INTRAVENOUS ONCE
Refills: 0 | Status: COMPLETED | OUTPATIENT
Start: 2025-03-28 | End: 2025-03-28

## 2025-03-28 RX ADMIN — CEFTRIAXONE 1000 MILLIGRAM(S): 500 INJECTION, POWDER, FOR SOLUTION INTRAMUSCULAR; INTRAVENOUS at 22:57

## 2025-03-28 RX ADMIN — Medication 650 MILLIGRAM(S): at 23:22

## 2025-03-28 RX ADMIN — CEFTRIAXONE 100 MILLIGRAM(S): 500 INJECTION, POWDER, FOR SOLUTION INTRAMUSCULAR; INTRAVENOUS at 19:58

## 2025-03-28 RX ADMIN — Medication 650 MILLIGRAM(S): at 21:57

## 2025-03-28 RX ADMIN — SODIUM CHLORIDE 1000 MILLILITER(S): 9 INJECTION, SOLUTION INTRAVENOUS at 22:39

## 2025-03-28 NOTE — ED PROVIDER NOTE - PROGRESS NOTE DETAILS
Leukocytosis to 18 noted.  UA positive for infection.  Patient given ceftriaxone here in ED.  Patient to be admitted for IV antibiotics

## 2025-03-28 NOTE — H&P ADULT - ATTENDING COMMENTS
Vital Signs Last 24 Hrs  T(C): 36.7 (28 Mar 2025 18:11), Max: 36.7 (28 Mar 2025 18:11)  T(F): 98.1 (28 Mar 2025 18:11), Max: 98.1 (28 Mar 2025 18:11)  HR: 94 (28 Mar 2025 18:11) (94 - 94)  BP: 106/58 (28 Mar 2025 18:11) (106/58 - 106/58)  RR: 14 (28 Mar 2025 18:11) (14 - 14)  SpO2: 99% (28 Mar 2025 18:11) (99% - 99%)  Parameters below as of 28 Mar 2025 18:11  Patient On (Oxygen Delivery Method): room air    Labs reviewed  wbc 18.7 with granulocytosis  hgb 12.1    BUN/Cr  45/2.1    UA   turbid  prot 100  Large LE  wbc TNTC  rbc - 16  bact tntc    urine cx  ( 2/10/25)  pan-sensitive E.coli Vital Signs Last 24 Hrs  T(C): 36.7 (28 Mar 2025 18:11), Max: 36.7 (28 Mar 2025 18:11)  T(F): 98.1 (28 Mar 2025 18:11), Max: 98.1 (28 Mar 2025 18:11)  HR: 94 (28 Mar 2025 18:11) (94 - 94)  BP: 106/58 (28 Mar 2025 18:11) (106/58 - 106/58)  RR: 14 (28 Mar 2025 18:11) (14 - 14)  SpO2: 99% (28 Mar 2025 18:11) (99% - 99%)  Parameters below as of 28 Mar 2025 18:11  Patient On (Oxygen Delivery Method): room air    Labs reviewed  wbc 18.7 with granulocytosis  hgb 12.1    BUN/Cr  45/2.1    UA   turbid  prot 100  Large LE  wbc TNTC  rbc - 16  bact tntc    urine cx  ( 2/10/25)  pan-sensitive E.coli    Impression  70 year old man with medical hx including CAD, HTN, BPH, IgA nephropathy, ESRD s/p renal  transplant who comes in with fever and urinary symptoms with + UA concerning for UTI. OF note patient had UTI with pansensitive E. coli that was treated.  Unsure why patient is having a recurrence especially this close to recent treatment.   Would image the kidney with CT with contrast but the risk of DONTRELL vs benefit is high.  Will hydrate for now  Obtain a renal US of the transplant.  Sepsis work up   Empiric antibiotics with cefepime for now - renally dosed  Gentle IVF hydration   Nephrology consult  Review need for CT scan with contrast in AM after U/S

## 2025-03-28 NOTE — H&P ADULT - PROBLEM SELECTOR PLAN 5
h/o HTN on amlodipine and labetalol 300mg BID  -BP soft in ED (98/52 - 108/52)  -hold home meds in s/o sepsis  -monitor BP  -adjust antihypertensive meds as appropriate

## 2025-03-28 NOTE — H&P ADULT - PROBLEM SELECTOR PLAN 2
hx of ESRD on HD 2/2 IgA nephropathy, s/p renal transplant 4 years ago  -outpatient nephrologist Dr. Esme Adams  -c/w home immunosuppressive therapy: prednisone 5mg qd, tacrolimus XR 1mg qd, cellcept 500mg BID  -c/w entecavir qd for HBV ppx  -f/u renal US in AM

## 2025-03-28 NOTE — H&P ADULT - PROBLEM SELECTOR PLAN 3
p/w Creatinine: 2.10  -baseline sCr s/p kidney transplant-1.67 (9/2023), 1.91-2.22 (2/2025)   -f/u urine lytes, calculate FENa  -avoid nephrotoxic agents  -c/w IVF for now  -trend BMP daily  -c/w renal diet per patient request  -Nephrology consult in AM

## 2025-03-28 NOTE — ED PROVIDER NOTE - OBJECTIVE STATEMENT
70-year-old male Serbian-speaking  services offered patient preferred to use son at bedside.  Patient with a history of a renal transplant approximately 5 years ago.  Hypertension, CAD, BPH.  Patient presents ED with complaint of fever as well as dysuria starting today.  Patient does finish a course of antibiotics 2 weeks ago for a UTI.  Patient took Tylenol prior to coming to ED.  Family porting nausea with no vomiting no chest pain or shortness of breath.

## 2025-03-28 NOTE — H&P ADULT - NSICDXPASTMEDICALHX_GEN_ALL_CORE_FT
PAST MEDICAL HISTORY:  BPH (benign prostatic hyperplasia)     ESRD (end stage renal disease) on dialysis     High cholesterol     HTN (hypertension)     IgA nephropathy     MTHFR mutation

## 2025-03-28 NOTE — ED PROVIDER NOTE - CLINICAL SUMMARY MEDICAL DECISION MAKING FREE TEXT BOX
70-year-old male history of renal transplant, hypertension, CAD, BPH, frequent UTIs returns to ED with continued urinary complaints.  Symptoms began today.  Patient just finished a course of antibiotics 2 weeks ago.  Concern for recurrent UTI.  Will get labs, UA, reassess

## 2025-03-28 NOTE — H&P ADULT - ASSESSMENT
70y M with PMH of HTN, HLD, CAD w/ stents, MTHFR mutation, Shingles, IgA nephropathy leading to ESRD previously on HD, now s/p renal transplant in 2021 presenting with fever, chills, and dysuria. Admitted to medicine for sepsis 2/2 UTI.

## 2025-03-28 NOTE — ED ADULT TRIAGE NOTE - RELATIONSHIP TO PATIENT
Upper Respiratory Infection in Children    AMBULATORY CARE:    An upper respiratory infection is also called a common cold. It can affect your child's nose, throat, ears, and sinuses. Most children get about 5 to 8 colds each year.     Common signs and symptoms include the following: Your child's cold symptoms will be worst for the first 3 to 5 days. Your child may have any of the following:     Runny or stuffy nose      Sneezing and coughing    Sore throat or hoarseness    Red, watery, and sore eyes    Tiredness or fussiness    Chills and a fever that usually lasts 1 to 3 days    Headache, body aches, or sore muscles    Seek care immediately if:     Your child's temperature reaches 105°F (40.6°C).      Your child has trouble breathing or is breathing faster than usual.       Your child's lips or nails turn blue.       Your child's nostrils flare when he or she takes a breath.       The skin above or below your child's ribs is sucked in with each breath.       Your child's heart is beating much faster than usual.       You see pinpoint or larger reddish-purple dots on your child's skin.       Your child stops urinating or urinates less than usual.       Your baby's soft spot on his or her head is bulging outward or sunken inward.       Your child has a severe headache or stiff neck.       Your child has chest or stomach pain.       Your baby is too weak to eat.     Contact your child's healthcare provider if:     Your child has a rectal, ear, or forehead temperature higher than 100.4°F (38°C).       Your child has an oral or pacifier temperature higher than 100°F (37.8°C).      Your child has an armpit temperature higher than 99°F (37.2°C).      Your child is younger than 2 years and has a fever for more than 24 hours.       Your child is 2 years or older and has a fever for more than 72 hours.       Your child has had thick nasal drainage for more than 2 days.       Your child has ear pain.       Your child has white spots on his or her tonsils.       Your child coughs up a lot of thick, yellow, or green mucus.       Your child is unable to eat, has nausea, or is vomiting.       Your child has increased tiredness and weakness.      Your child's symptoms do not improve or get worse within 3 days.       You have questions or concerns about your child's condition or care.    Treatment for your child's cold: There is no cure for the common cold. Colds are caused by viruses and do not get better with antibiotics. Most colds in children go away without treatment in 1 to 2 weeks. Do not give over-the-counter (OTC) cough or cold medicines to children younger than 4 years. Your child's healthcare provider may tell you not to give these medicines to children younger than 6 years. OTC cough and cold medicines can cause side effects that may harm your child. Your child may need any of the following to help manage his or her symptoms:     Over the counter Cough suppressants and Decongestants have not been shown to be effective in children. please consult with your physician before giving them to your child.    Acetaminophen decreases pain and fever. It is available without a doctor's order. Ask how much to give your child and how often to give it. Follow directions. Read the labels of all other medicines your child uses to see if they also contain acetaminophen, or ask your child's doctor or pharmacist. Acetaminophen can cause liver damage if not taken correctly.    NSAIDs, such as ibuprofen, help decrease swelling, pain, and fever. This medicine is available with or without a doctor's order. NSAIDs can cause stomach bleeding or kidney problems in certain people. If your child takes blood thinner medicine, always ask if NSAIDs are safe for him. Always read the medicine label and follow directions. Do not give these medicines to children under 6 months of age without direction from your child's healthcare provider.    Do not give aspirin to children under 18 years of age. Your child could develop Reye syndrome if he takes aspirin. Reye syndrome can cause life-threatening brain and liver damage. Check your child's medicine labels for aspirin, salicylates, or oil of wintergreen.       Give your child's medicine as directed. Contact your child's healthcare provider if you think the medicine is not working as expected. Tell him or her if your child is allergic to any medicine. Keep a current list of the medicines, vitamins, and herbs your child takes. Include the amounts, and when, how, and why they are taken. Bring the list or the medicines in their containers to follow-up visits. Carry your child's medicine list with you in case of an emergency.    Care for your child:     Have your child rest. Rest will help his or her body get better.     Give your child more liquids as directed. Liquids will help thin and loosen mucus so your child can cough it up. Liquids will also help prevent dehydration. Liquids that help prevent dehydration include water, fruit juice, and broth. Do not give your child liquids that contain caffeine. Caffeine can increase your child's risk for dehydration. Ask your child's healthcare provider how much liquid to give your child each day.     Clear mucus from your child's nose. Use a bulb syringe to remove mucus from a baby's nose. Squeeze the bulb and put the tip into one of your baby's nostrils. Gently close the other nostril with your finger. Slowly release the bulb to suck up the mucus. Empty the bulb syringe onto a tissue. Repeat the steps if needed. Do the same thing in the other nostril. Make sure your baby's nose is clear before he or she feeds or sleeps. Your child's healthcare provider may recommend you put saline drops into your baby's nose if the mucus is very thick.     Soothe your child's throat. If your child is 8 years or older, have him or her gargle with salt water. Make salt water by dissolving ¼ teaspoon salt in 1 cup warm water.     Soothe your child's cough. You can give honey to children older than 1 year. Give ½ teaspoon of honey to children 1 to 5 years. Give 1 teaspoon of honey to children 6 to 11 years. Give 2 teaspoons of honey to children 12 or older.    Use a cool-mist humidifier. This will add moisture to the air and help your child breathe easier. Make sure the humidifier is out of your child's reach.    Apply petroleum-based jelly around the outside of your child's nostrils. This can decrease irritation from blowing his or her nose.     Keep your child away from smoke. Do not smoke near your child. Do not let your older child smoke. Nicotine and other chemicals in cigarettes and cigars can make your child's symptoms worse. They can also cause infections such as bronchitis or pneumonia. Ask your child's healthcare provider for information if you or your child currently smoke and need help to quit. E-cigarettes or smokeless tobacco still contain nicotine. Talk to your healthcare provider before you or your child use these products.     Prevent the spread of a cold:     Keep your child away from other people during the first 3 to 5 days of his or her cold. The virus is spread most easily during this time.     Wash your hands and your child's hands often. Teach your child to cover his or her nose and mouth when he or she sneezes, coughs, and blows his or her nose. Show your child how to cough and sneeze into the crook of the elbow instead of the hands.      Do not let your child share toys, pacifiers, or towels with others while he or she is sick.     Do not let your child share foods, eating utensils, cups, or drinks with others while he or she is sick.    Follow up with your child's healthcare provider as directed: Write down your questions so you remember to ask them during your child's visits. Son

## 2025-03-28 NOTE — H&P ADULT - HISTORY OF PRESENT ILLNESS
70y M with PMH of HTN, HLD, CAD w/ stents, MTHFR mutation, Shingles, IgA nephropathy leading to ESRD previously on HD, now s/p renal transplant in  presenting with fever, chills, and dysuria. Per son providing collateral and Lao translation at bedside, UTIs and current symptoms have been a recurring issue since early February. At that time patient came to ED for symptoms but deferred admission opting for discharge home with oral antibiotics. Patient reports experiencing UTI symptoms every other week, including fever, right flank pain, urinary frequency and dysuria. Current episode notable for more severe symptoms, including shaking chills interfering with daily activities and significant weakness. Describes cyclical pattern: fever onset triggers frequent urination and severe chills. Takes Tylenol for relief, but fever returns once medication wears off. Reports pain during urination, especially when febrile, and sensation of incomplete bladder emptying during febrile episodes. Patient notes some nausea but denies vomiting, shortness of breath, cough, chest pain, or palpitations. Reports significant weight loss and decreased appetite recently. Patient still adheres to renal-restricted diet and denies alcohol consumption, smoking, or illicit drug use. Can walk independently but has limitations with long distances. Recent antibiotic treatments for recurring UTIs have not fully resolved the issue. Review of systems positive for fever, chills, weakness, nausea, frequent urination, dysuria, and sensation of incomplete bladder emptying. Negative for rash, chest pain, palpitations, shortness of breath, cough, vomiting, abdominal pain, diarrhea, constipation, dizziness, and lightheadedness.          .       In the ED:  T(F): , Max: 99 (18:38); HR:  (84 - 94); BP:  (98/52 - 108/52); RR:  (14 - 17); SpO2:  (94% - 99%)  WBC:18.65, Hb.1, PLT:198  Na:137, K:4.1, Cl:110, HCO3:21, BUN:45, sCr:2.10, Lactate: 0.8  AST:17, ALT:20, ALP:70, Tbili:0.8, Lipase:--     s/p acetaminophen     Tablet .. 650 milliGRAM(s); atorvastatin 40 milliGRAM(s); cefTRIAXone   IVPB 1000 milliGRAM(s); lactated ringers Bolus 1000 milliLiter(s); mycophenolate mofetil 500 milliGRAM(s); tamsulosin 0.4 milliGRAM(s) 70y M with PMH of HTN, HLD, CAD w/ stents, MTHFR mutation, Shingles, IgA nephropathy leading to ESRD previously on HD, now s/p renal transplant in  presenting with fever, chills, and dysuria. Per son providing collateral and Malaysian translation at bedside, UTIs and current symptoms have been a recurring issue since early February. At that time patient came to ED for symptoms but deferred admission opting for discharge home with oral antibiotics. Patient reports experiencing UTI symptoms every other week, including fever, right flank pain, urinary frequency and dysuria. Current episode notable for more severe symptoms, including shaking chills interfering with daily activities and significant weakness. Describes cyclical pattern: fever onset triggers frequent urination and severe chills. Takes Tylenol for relief, but fever returns once medication wears off. Reports pain during urination, especially when febrile, and sensation of incomplete bladder emptying during febrile episodes. Patient notes some nausea but denies vomiting, shortness of breath, cough, chest pain, or palpitations. Reports significant weight loss and decreased appetite recently. Patient still adheres to renal-restricted diet and denies alcohol consumption, smoking, or illicit drug use. Can walk independently but has limitations with long distances. Recent antibiotic treatments for recurring UTIs have not fully resolved the issue. Review of systems positive for fever, chills, weakness, nausea, frequent urination, dysuria, and sensation of incomplete bladder emptying. Negative for rash, chest pain, palpitations, shortness of breath, cough, vomiting, abdominal pain, diarrhea, constipation, dizziness, and lightheadedness.      In the ED:  T(F): , Max: 99 (18:38); HR:  (84 - 94); BP:  (98/52 - 108/52); RR:  (14 - 17); SpO2:  (94% - 99%)  WBC:18.65, Hb.1, PLT:198  Na:137, K:4.1, Cl:110, HCO3:21, BUN:45, sCr:2.10, Lactate: 0.8  AST:17, ALT:20, ALP:70, Tbili:0.8, Lipase:--     s/p acetaminophen     Tablet .. 650 milliGRAM(s); atorvastatin 40 milliGRAM(s); cefTRIAXone   IVPB 1000 milliGRAM(s); lactated ringers Bolus 1000 milliLiter(s); mycophenolate mofetil 500 milliGRAM(s); tamsulosin 0.4 milliGRAM(s)

## 2025-03-28 NOTE — ED PROVIDER NOTE - CONSTITUTIONAL [+], MLM
book, [facility], Right @ Home, Information for [place]. CM placed Medicare Important Message in patient Medical Chart. CM will continue to follow. FEVER/CHILLS

## 2025-03-28 NOTE — H&P ADULT - PROBLEM SELECTOR PLAN 4
hx of CAD with stents  -c/w home aspirin and atorvastatin  -R base crackles on exam  -CXR shows R base atelectasis vs. infiltrate (wet read) -- f/u official report   -last TTE (6/2019): EF 50-55%, Grade II diastolic dysfunction disoriented to time

## 2025-03-28 NOTE — ED ADULT NURSE NOTE - NSFALLUNIVINTERV_ED_ALL_ED
Bed/Stretcher in lowest position, wheels locked, appropriate side rails in place/Call bell, personal items and telephone in reach/Instruct patient to call for assistance before getting out of bed/chair/stretcher/Non-slip footwear applied when patient is off stretcher/[place] to call system/Physically safe environment - no spills, clutter or unnecessary equipment/Purposeful proactive rounding/Room/bathroom lighting operational, light cord in reach

## 2025-03-28 NOTE — H&P ADULT - NSHPPHYSICALEXAM_GEN_ALL_CORE
LOS: 1d    VITALS:   T(C): 36.9 (03-29-25 @ 00:01), Max: 37.2 (03-28-25 @ 18:38)  HR: 84 (03-29-25 @ 00:01) (84 - 94)  BP: 108/52 (03-29-25 @ 00:01) (98/52 - 108/52)  RR: 17 (03-29-25 @ 00:01) (14 - 17)  SpO2: 94% (03-29-25 @ 00:01) (94% - 99%)    GENERAL: NAD, lying in bed comfortably, fatigued and ill appearing  HEAD:  Atraumatic, Normocephalic  EYES: EOMI, PERRLA, conjunctiva and sclera clear  ENT: dry mucous membranes  NECK: Supple, No JVD  CHEST/LUNG: Mild tachypnea, Right base crackles, otherwise CTA bilaterally; No rhonchi, wheezing, or rubs. Unlabored respirations  HEART: Regular rate and rhythm; systolic murmur, no rubs or gallops  ABDOMEN: BSx4; Soft, nontender, nondistended, no CVA tenderness b/l, no tenderness to deep palpation over LLQ transplanted kidney  EXTREMITIES:  2+ Peripheral Pulses, brisk capillary refill. No clubbing, cyanosis, or edema  NERVOUS SYSTEM:  A&Ox3, no focal deficits   SKIN: well healed surgical scar in LLQ, No rashes or lesions

## 2025-03-28 NOTE — H&P ADULT - PROBLEM SELECTOR PLAN 1
p/w fever, HR >90, WBC>12, RR>20, lactate  -UA: WBC TNTC, RBC 16, large leuk esterase, negative nitrite, bacteria TNTC  -UCx from 2/13/25 grew pan sensitive E. coli s/p course of PO cefuroxime   -CXR:R base atelectasis vs. infiltrate (wet read) -- f/u official report   -s/p ceftriaxone in ED and  1L LR as bolus in ED  -c/w maintenance IVF  -c/w cefepime 2g q24 (renally dosed)  -f/u BCx, UCx  -f/u renal US in AM  -ID consult in AM

## 2025-03-29 ENCOUNTER — TRANSCRIPTION ENCOUNTER (OUTPATIENT)
Age: 70
End: 2025-03-29

## 2025-03-29 DIAGNOSIS — I10 ESSENTIAL (PRIMARY) HYPERTENSION: ICD-10-CM

## 2025-03-29 DIAGNOSIS — E78.5 HYPERLIPIDEMIA, UNSPECIFIED: ICD-10-CM

## 2025-03-29 DIAGNOSIS — Z95.5 PRESENCE OF CORONARY ANGIOPLASTY IMPLANT AND GRAFT: Chronic | ICD-10-CM

## 2025-03-29 DIAGNOSIS — N40.0 BENIGN PROSTATIC HYPERPLASIA WITHOUT LOWER URINARY TRACT SYMPTOMS: ICD-10-CM

## 2025-03-29 DIAGNOSIS — Z29.9 ENCOUNTER FOR PROPHYLACTIC MEASURES, UNSPECIFIED: ICD-10-CM

## 2025-03-29 DIAGNOSIS — Z94.0 KIDNEY TRANSPLANT STATUS: ICD-10-CM

## 2025-03-29 DIAGNOSIS — N28.9 DISORDER OF KIDNEY AND URETER, UNSPECIFIED: ICD-10-CM

## 2025-03-29 DIAGNOSIS — I25.10 ATHEROSCLEROTIC HEART DISEASE OF NATIVE CORONARY ARTERY WITHOUT ANGINA PECTORIS: ICD-10-CM

## 2025-03-29 DIAGNOSIS — A41.9 SEPSIS, UNSPECIFIED ORGANISM: ICD-10-CM

## 2025-03-29 LAB
A1C WITH ESTIMATED AVERAGE GLUCOSE RESULT: 5.6 % — SIGNIFICANT CHANGE UP (ref 4–5.6)
ALBUMIN SERPL ELPH-MCNC: 3 G/DL — LOW (ref 3.5–5)
ALP SERPL-CCNC: 62 U/L — SIGNIFICANT CHANGE UP (ref 40–120)
ALT FLD-CCNC: 41 U/L DA — SIGNIFICANT CHANGE UP (ref 10–60)
ANION GAP SERPL CALC-SCNC: 6 MMOL/L — SIGNIFICANT CHANGE UP (ref 5–17)
APPEARANCE UR: CLEAR — SIGNIFICANT CHANGE UP
AST SERPL-CCNC: 36 U/L — SIGNIFICANT CHANGE UP (ref 10–40)
BACTERIA # UR AUTO: ABNORMAL /HPF
BASOPHILS # BLD AUTO: 0.08 K/UL — SIGNIFICANT CHANGE UP (ref 0–0.2)
BASOPHILS NFR BLD AUTO: 1 % — SIGNIFICANT CHANGE UP (ref 0–2)
BILIRUB SERPL-MCNC: 0.2 MG/DL — SIGNIFICANT CHANGE UP (ref 0.2–1.2)
BILIRUB UR-MCNC: NEGATIVE — SIGNIFICANT CHANGE UP
BUN SERPL-MCNC: 42 MG/DL — HIGH (ref 7–18)
CALCIUM SERPL-MCNC: 9.2 MG/DL — SIGNIFICANT CHANGE UP (ref 8.4–10.5)
CHLORIDE SERPL-SCNC: 100 MMOL/L — SIGNIFICANT CHANGE UP (ref 96–108)
CHOLEST SERPL-MCNC: 161 MG/DL — SIGNIFICANT CHANGE UP
CO2 SERPL-SCNC: 30 MMOL/L — SIGNIFICANT CHANGE UP (ref 22–31)
COLOR SPEC: YELLOW — SIGNIFICANT CHANGE UP
COMMENT - URINE: SIGNIFICANT CHANGE UP
CREAT SERPL-MCNC: 0.75 MG/DL — SIGNIFICANT CHANGE UP (ref 0.5–1.3)
DIFF PNL FLD: ABNORMAL
EGFR: 98 ML/MIN/1.73M2 — SIGNIFICANT CHANGE UP
EGFR: 98 ML/MIN/1.73M2 — SIGNIFICANT CHANGE UP
EOSINOPHIL # BLD AUTO: 0.17 K/UL — SIGNIFICANT CHANGE UP (ref 0–0.5)
EOSINOPHIL NFR BLD AUTO: 2.2 % — SIGNIFICANT CHANGE UP (ref 0–6)
EPI CELLS # UR: PRESENT
ESTIMATED AVERAGE GLUCOSE: 114 MG/DL — SIGNIFICANT CHANGE UP (ref 68–114)
GLUCOSE SERPL-MCNC: 120 MG/DL — HIGH (ref 70–99)
GLUCOSE UR QL: NEGATIVE MG/DL — SIGNIFICANT CHANGE UP
HCT VFR BLD CALC: 35.8 % — LOW (ref 39–50)
HDLC SERPL-MCNC: 38 MG/DL — LOW
HGB BLD-MCNC: 11.9 G/DL — LOW (ref 13–17)
IMM GRANULOCYTES NFR BLD AUTO: 1.5 % — HIGH (ref 0–0.9)
KETONES UR-MCNC: NEGATIVE MG/DL — SIGNIFICANT CHANGE UP
LDLC SERPL-MCNC: 90 MG/DL — SIGNIFICANT CHANGE UP
LEUKOCYTE ESTERASE UR-ACNC: ABNORMAL
LIPID PNL WITH DIRECT LDL SERPL: 90 MG/DL — SIGNIFICANT CHANGE UP
LYMPHOCYTES # BLD AUTO: 2.28 K/UL — SIGNIFICANT CHANGE UP (ref 1–3.3)
LYMPHOCYTES # BLD AUTO: 29.2 % — SIGNIFICANT CHANGE UP (ref 13–44)
MCHC RBC-ENTMCNC: 31.2 PG — SIGNIFICANT CHANGE UP (ref 27–34)
MCHC RBC-ENTMCNC: 33.2 G/DL — SIGNIFICANT CHANGE UP (ref 32–36)
MCV RBC AUTO: 94 FL — SIGNIFICANT CHANGE UP (ref 80–100)
MONOCYTES # BLD AUTO: 1.53 K/UL — HIGH (ref 0–0.9)
MONOCYTES NFR BLD AUTO: 19.6 % — HIGH (ref 2–14)
NEUTROPHILS # BLD AUTO: 3.63 K/UL — SIGNIFICANT CHANGE UP (ref 1.8–7.4)
NEUTROPHILS NFR BLD AUTO: 46.5 % — SIGNIFICANT CHANGE UP (ref 43–77)
NITRITE UR-MCNC: NEGATIVE — SIGNIFICANT CHANGE UP
NONHDLC SERPL-MCNC: 123 MG/DL — SIGNIFICANT CHANGE UP
NRBC BLD AUTO-RTO: 0 /100 WBCS — SIGNIFICANT CHANGE UP (ref 0–0)
PH UR: 6 — SIGNIFICANT CHANGE UP (ref 5–8)
PLATELET # BLD AUTO: 311 K/UL — SIGNIFICANT CHANGE UP (ref 150–400)
POTASSIUM SERPL-MCNC: 4.1 MMOL/L — SIGNIFICANT CHANGE UP (ref 3.5–5.3)
POTASSIUM SERPL-SCNC: 4.1 MMOL/L — SIGNIFICANT CHANGE UP (ref 3.5–5.3)
PROT SERPL-MCNC: 6.6 G/DL — SIGNIFICANT CHANGE UP (ref 6–8.3)
PROT UR-MCNC: 30 MG/DL
RBC # BLD: 3.81 M/UL — LOW (ref 4.2–5.8)
RBC # FLD: 13.3 % — SIGNIFICANT CHANGE UP (ref 10.3–14.5)
RBC CASTS # UR COMP ASSIST: 3 /HPF — SIGNIFICANT CHANGE UP (ref 0–4)
SODIUM SERPL-SCNC: 136 MMOL/L — SIGNIFICANT CHANGE UP (ref 135–145)
SP GR SPEC: 1.01 — SIGNIFICANT CHANGE UP (ref 1–1.03)
TRIGL SERPL-MCNC: 194 MG/DL — HIGH
UROBILINOGEN FLD QL: 1 MG/DL — SIGNIFICANT CHANGE UP (ref 0.2–1)
WBC # BLD: 7.81 K/UL — SIGNIFICANT CHANGE UP (ref 3.8–10.5)
WBC # FLD AUTO: 7.81 K/UL — SIGNIFICANT CHANGE UP (ref 3.8–10.5)
WBC UR QL: 20 /HPF — HIGH (ref 0–5)

## 2025-03-29 PROCEDURE — 99233 SBSQ HOSP IP/OBS HIGH 50: CPT

## 2025-03-29 RX ORDER — ATORVASTATIN CALCIUM 80 MG/1
0 TABLET, FILM COATED ORAL
Qty: 0 | Refills: 0 | DISCHARGE

## 2025-03-29 RX ORDER — SODIUM CHLORIDE 9 G/1000ML
1000 INJECTION, SOLUTION INTRAVENOUS ONCE
Refills: 0 | Status: COMPLETED | OUTPATIENT
Start: 2025-03-29 | End: 2025-03-29

## 2025-03-29 RX ORDER — MYCOPHENOLATE MOFETIL 500 MG/1
500 TABLET, FILM COATED ORAL
Refills: 0 | Status: DISCONTINUED | OUTPATIENT
Start: 2025-03-29 | End: 2025-04-01

## 2025-03-29 RX ORDER — TAMSULOSIN HYDROCHLORIDE 0.4 MG/1
0.4 CAPSULE ORAL ONCE
Refills: 0 | Status: COMPLETED | OUTPATIENT
Start: 2025-03-29 | End: 2025-03-29

## 2025-03-29 RX ORDER — AMLODIPINE BESYLATE 10 MG/1
1 TABLET ORAL
Refills: 0 | DISCHARGE

## 2025-03-29 RX ORDER — ATORVASTATIN CALCIUM 80 MG/1
40 TABLET, FILM COATED ORAL ONCE
Refills: 0 | Status: COMPLETED | OUTPATIENT
Start: 2025-03-29 | End: 2025-03-29

## 2025-03-29 RX ORDER — PREDNISONE 20 MG/1
1 TABLET ORAL
Refills: 0 | DISCHARGE

## 2025-03-29 RX ORDER — TACROLIMUS 0.5 MG/1
2 CAPSULE ORAL DAILY
Refills: 0 | Status: DISCONTINUED | OUTPATIENT
Start: 2025-03-29 | End: 2025-04-01

## 2025-03-29 RX ORDER — METFORMIN HYDROCHLORIDE 850 MG/1
0 TABLET ORAL
Qty: 0 | Refills: 0 | DISCHARGE

## 2025-03-29 RX ORDER — ENTECAVIR 0.5 MG/1
0.5 TABLET ORAL DAILY
Refills: 0 | Status: DISCONTINUED | OUTPATIENT
Start: 2025-03-29 | End: 2025-04-01

## 2025-03-29 RX ORDER — CEFEPIME 2 G/20ML
2000 INJECTION, POWDER, FOR SOLUTION INTRAVENOUS EVERY 24 HOURS
Refills: 0 | Status: DISCONTINUED | OUTPATIENT
Start: 2025-03-29 | End: 2025-03-31

## 2025-03-29 RX ORDER — ASPIRIN 325 MG
81 TABLET ORAL DAILY
Refills: 0 | Status: DISCONTINUED | OUTPATIENT
Start: 2025-03-29 | End: 2025-04-01

## 2025-03-29 RX ORDER — MYCOPHENOLATE MOFETIL 500 MG/1
500 TABLET, FILM COATED ORAL ONCE
Refills: 0 | Status: COMPLETED | OUTPATIENT
Start: 2025-03-29 | End: 2025-03-29

## 2025-03-29 RX ORDER — TAMSULOSIN HYDROCHLORIDE 0.4 MG/1
1 CAPSULE ORAL
Refills: 0 | DISCHARGE

## 2025-03-29 RX ORDER — ASPIRIN 325 MG
1 TABLET ORAL
Refills: 0 | DISCHARGE

## 2025-03-29 RX ORDER — HEPARIN SODIUM 1000 [USP'U]/ML
5000 INJECTION INTRAVENOUS; SUBCUTANEOUS EVERY 12 HOURS
Refills: 0 | Status: DISCONTINUED | OUTPATIENT
Start: 2025-03-29 | End: 2025-04-01

## 2025-03-29 RX ORDER — ATORVASTATIN CALCIUM 80 MG/1
40 TABLET, FILM COATED ORAL AT BEDTIME
Refills: 0 | Status: DISCONTINUED | OUTPATIENT
Start: 2025-03-29 | End: 2025-04-01

## 2025-03-29 RX ORDER — PREDNISONE 20 MG/1
5 TABLET ORAL DAILY
Refills: 0 | Status: DISCONTINUED | OUTPATIENT
Start: 2025-03-29 | End: 2025-04-01

## 2025-03-29 RX ORDER — TAMSULOSIN HYDROCHLORIDE 0.4 MG/1
0.4 CAPSULE ORAL AT BEDTIME
Refills: 0 | Status: DISCONTINUED | OUTPATIENT
Start: 2025-03-29 | End: 2025-04-01

## 2025-03-29 RX ORDER — ATORVASTATIN CALCIUM 80 MG/1
1 TABLET, FILM COATED ORAL
Refills: 0 | DISCHARGE

## 2025-03-29 RX ADMIN — TACROLIMUS 2 MILLIGRAM(S): 0.5 CAPSULE ORAL at 11:54

## 2025-03-29 RX ADMIN — MYCOPHENOLATE MOFETIL 500 MILLIGRAM(S): 500 TABLET, FILM COATED ORAL at 01:04

## 2025-03-29 RX ADMIN — Medication 650 MILLIGRAM(S): at 18:40

## 2025-03-29 RX ADMIN — CEFEPIME 100 MILLIGRAM(S): 2 INJECTION, POWDER, FOR SOLUTION INTRAVENOUS at 05:54

## 2025-03-29 RX ADMIN — MYCOPHENOLATE MOFETIL 500 MILLIGRAM(S): 500 TABLET, FILM COATED ORAL at 18:10

## 2025-03-29 RX ADMIN — Medication 650 MILLIGRAM(S): at 04:54

## 2025-03-29 RX ADMIN — ATORVASTATIN CALCIUM 40 MILLIGRAM(S): 80 TABLET, FILM COATED ORAL at 01:07

## 2025-03-29 RX ADMIN — Medication 650 MILLIGRAM(S): at 17:36

## 2025-03-29 RX ADMIN — PREDNISONE 5 MILLIGRAM(S): 20 TABLET ORAL at 05:54

## 2025-03-29 RX ADMIN — ATORVASTATIN CALCIUM 40 MILLIGRAM(S): 80 TABLET, FILM COATED ORAL at 21:46

## 2025-03-29 RX ADMIN — Medication 650 MILLIGRAM(S): at 03:54

## 2025-03-29 RX ADMIN — TAMSULOSIN HYDROCHLORIDE 0.4 MILLIGRAM(S): 0.4 CAPSULE ORAL at 21:46

## 2025-03-29 RX ADMIN — MYCOPHENOLATE MOFETIL 500 MILLIGRAM(S): 500 TABLET, FILM COATED ORAL at 05:54

## 2025-03-29 RX ADMIN — HEPARIN SODIUM 5000 UNIT(S): 1000 INJECTION INTRAVENOUS; SUBCUTANEOUS at 05:52

## 2025-03-29 RX ADMIN — Medication 81 MILLIGRAM(S): at 11:55

## 2025-03-29 RX ADMIN — SODIUM CHLORIDE 1000 MILLILITER(S): 9 INJECTION, SOLUTION INTRAVENOUS at 08:01

## 2025-03-29 RX ADMIN — HEPARIN SODIUM 5000 UNIT(S): 1000 INJECTION INTRAVENOUS; SUBCUTANEOUS at 17:41

## 2025-03-29 RX ADMIN — TAMSULOSIN HYDROCHLORIDE 0.4 MILLIGRAM(S): 0.4 CAPSULE ORAL at 01:10

## 2025-03-29 RX ADMIN — ENTECAVIR 0.5 MILLIGRAM(S): 0.5 TABLET ORAL at 11:53

## 2025-03-29 NOTE — DISCHARGE NOTE PROVIDER - NSDCMRMEDTOKEN_GEN_ALL_CORE_FT
amLODIPine 5 mg oral tablet: 1 tab(s) orally once a day  aspirin 81 mg oral tablet: 1 tab(s) orally once a day  atorvastatin 40 mg oral tablet: 1 tab(s) orally once a day (at bedtime)  ENTECAVIR 0.5 MG TABLET: 1 tab(s) orally once a day  ENVARSUS XR 1 MG TABLET: 2 tab(s) orally once a day  LABETALOL  MG TABLET: 1 tab(s) orally 2 times a day  MYCOPHENOLATE 250 MG CAPSULE: 2 cap(s) orally 2 times a day  PREDNISONE 5 MG TABLET: 1 tab(s) orally once a day  TAMSULOSIN HCL 0.4 MG CAPSULE: 1 cap(s) orally once a day (at bedtime)   amLODIPine 5 mg oral tablet: 1 tab(s) orally once a day  aspirin 81 mg oral tablet: 1 tab(s) orally once a day  atorvastatin 40 mg oral tablet: 1 tab(s) orally once a day (at bedtime)  cefpodoxime 200 mg oral tablet: 2 tab(s) orally 2 times a day  ENTECAVIR 0.5 MG TABLET: 1 tab(s) orally once a day  ENVARSUS XR 1 MG TABLET: 2 tab(s) orally once a day  LABETALOL  MG TABLET: 1 tab(s) orally 2 times a day  MYCOPHENOLATE 250 MG CAPSULE: 2 cap(s) orally 2 times a day  PREDNISONE 5 MG TABLET: 1 tab(s) orally once a day  TAMSULOSIN HCL 0.4 MG CAPSULE: 1 cap(s) orally once a day (at bedtime)

## 2025-03-29 NOTE — DISCHARGE NOTE PROVIDER - ATTENDING DISCHARGE PHYSICAL EXAMINATION:
Vital Signs Last 24 Hrs  T(C): 36.6 (01 Apr 2025 13:30), Max: 36.8 (31 Mar 2025 21:07)  T(F): 97.8 (01 Apr 2025 13:30), Max: 98.3 (31 Mar 2025 21:07)  HR: 82 (01 Apr 2025 13:30) (78 - 83)  BP: 118/68 (01 Apr 2025 13:30) (118/68 - 142/85)  BP(mean): 84 (01 Apr 2025 13:30) (84 - 84)  RR: 17 (01 Apr 2025 13:30) (16 - 17)  SpO2: 95% (01 Apr 2025 13:30) (95% - 97%)    Parameters below as of 01 Apr 2025 13:30  Patient On (Oxygen Delivery Method): room air    CONSTITUTIONAL: Well appearing, well nourished, awake, alert and in no apparent distress  ENMT: Airway patent, Nasal mucosa clear. Mouth with normal mucosa. Throat has no vesicles, no oropharyngeal exudates and uvula is midline.  EYES: Clear bilaterally, pupils equal, round and reactive to light. EOMI.  CARDIAC: Normal rate, regular rhythm.  Heart sounds S1, S2.  No murmurs, rubs or gallops   RESPIRATORY: Breath sounds clear and equal bilaterally. No wheezes, rales or rhonchi  MUSCULOSKELETAL: Spine appears normal, range of motion is not limited, no muscle or joint tenderness  EXTREMITIES: No edema, cyanosis or deformity   NEUROLOGICAL: Alert and oriented, no focal deficits, no motor or sensory deficits.  SKIN: No rash, skin turgor

## 2025-03-29 NOTE — DISCHARGE NOTE PROVIDER - NSTOBACCOUSAGEY/N_GEN_A_CS
[Weight management counseling provided] : Weight management [Healthy eating counseling provided] : healthy eating [Activity counseling provided] : activity [Good understanding] : Patient has a good understanding of lifestyle changes and the steps needed to achieve self management goals No

## 2025-03-29 NOTE — PROGRESS NOTE ADULT - ATTENDING COMMENTS
70 year old man with medical hx including CAD, HTN, BPH, IgA nephropathy, ESRD s/p renal  transplant who comes in with fever and urinary symptoms with + UA concerning for UTI. OF note patient had UTI with pansensitive E. coli that was treated.  Unsure why patient is having a recurrence especially this close to recent treatment.   Would image the kidney with CT with contrast but the risk of DONTRELL vs benefit is high.    Alert, cooperative man in NAD  Vital Signs Last 24 Hrs  T(C): 37.3 (29 Mar 2025 14:19), Max: 37.4 (29 Mar 2025 05:16)  T(F): 99.1 (29 Mar 2025 14:19), Max: 99.4 (29 Mar 2025 05:16)  HR: 77 (29 Mar 2025 14:19) (77 - 94)  BP: 110/61 (29 Mar 2025 14:19) (94/49 - 118/57)  BP(mean): 75 (29 Mar 2025 09:11) (75 - 75)  RR: 16 (29 Mar 2025 14:19) (14 - 17)  SpO2: 95% (29 Mar 2025 14:19) (94% - 99%)    Parameters below as of 29 Mar 2025 14:19  Patient On (Oxygen Delivery Method): room air    Lungs, clear  Cor, RRR  Abdomen, soft, non-tender  Neurological, intact                          11.9   7.81  )-----------( 311      ( 29 Mar 2025 06:36 )             35.8   03-29    136  |  100  |  42[H]  ----------------------------<  120[H]  4.1   |  30  |  0.75    Ca    9.2      29 Mar 2025 06:36    TPro  6.6  /  Alb  3.0[L]  /  TBili  0.2  /  DBili  x   /  AST  36  /  ALT  41  /  AlkPhos  62  03-29    White Blood Cell - Urine: 20 /HPF (03.29.25 @ 13:20)   >100,000 CFU/ml Escherichia coli    IMP: Recurrent, complicated UTI in a post-transplant patient         r/o esequiel-nephric abscess  Plan: Continue cefepime pending cultures          renal ultrasound  Discussed with patient and with son, who is at the bedside.

## 2025-03-29 NOTE — DISCHARGE NOTE PROVIDER - CARE PROVIDER_API CALL
Dread Rizzo.  Internal Medicine  [address], [address]  [place] [address]  Phone: [phone]  Fax: [phone]  Follow Up Time:    Dread Rizzo  Internal Medicine  [address], [address]  [place] [address]  Phone: [phone]  Fax: [phone]  Follow Up Time:     Esme Adams  Nephrology  [address], [address]  [place] [address]  Phone: [phone]  Fax: [phone]  Follow Up Time:     Jamie Schneider  Phone: [phone]  Fax: (   )    -  Follow Up Time:

## 2025-03-29 NOTE — GOALS OF CARE CONVERSATION - ADVANCED CARE PLANNING - CONVERSATION DETAILS
Goals of care discussed with pt at bedside using language line solution Haitian  Josh [id number] and pt wishes to pursue all possible heroic measures such as CPR, intubation, vasopressors, central lines, antibiotics, HD and feeding tube to keep him alive.    MOLST form drafted, pt is FULL CODE.

## 2025-03-29 NOTE — DISCHARGE NOTE PROVIDER - NSDCCPCAREPLAN_GEN_ALL_CORE_FT
PRINCIPAL DISCHARGE DIAGNOSIS  Diagnosis: Acute UTI  Assessment and Plan of Treatment: Continue taking your antibiotics as prescribed and monitor for signs and symptoms of infection, such as fever or chills, burning/pain with urination, urinary frequency/hesitancy, cloudy or bloody urine.        SECONDARY DISCHARGE DIAGNOSES  Diagnosis: Sepsis due to urinary tract infection  Assessment and Plan of Treatment: Sepsis happens when an infection spreads and causes your body to react strongly to germs. Your body's defense system normally releases chemicals to fight off infection at the infected area. When infection spreads, chemicals are released throughout your body. The chemicals cause inflammation and clotting in small blood vessels that is difficult to control. Inflammation and clotting decrease blood flow and oxygen to your organs. This may cause your organs to stop working correctly. Sepsis is a life-threatening emergency.  if you have any pain or low grade temperature of 100.4F, you can take tylenol 650 mg every 6 hours as needed  Take all antibiotics as ordered.  Call you Health care provider upon arrival home to make a one week follow up appointment.  If you develop fever, chills, malaise, or change in mental status call your Health Care Provider or go to the Emergency Department.  Nutrition is important, eat small frequent meals to help ensure you get adequate calories.  Do not stay in bed all day!  Increase your activity daily as tolerated.      Diagnosis: CAD (coronary artery disease)  Assessment and Plan of Treatment: CAD is narrowing of the arteries to your heart caused by a buildup of plaque (cholesterol and other substances). The narrowing in your arteries decreases the amount of blood that can flow to your heart. This causes your heart to get less oxygen, which may be life-threatening.  What increases my risk for CAD?  Age 45 years or older  Obesity or lack of exercise  Poor nutrition  A family history of CAD  Smoking or regular exposure to secondhand smoke  A medical condition, such as high blood pressure, high cholesterol, or diabetes  Alcohol use  Stress  Eat heart-healthy foods. Include fresh fruits and vegetables in your meal plan. Choose low-fat foods, such as skim or 1% fat milk, low-fat cheese and yogurt, fish, chicken (without skin), and lean meats. Eat two 4-ounce servings of fish high in omega-3 fats each week, such as salmon, fresh tuna, and herring.      Diagnosis: Renal transplant, status post  Assessment and Plan of Treatment: Please continue to take your transplant medication as prescribed and make sure to follow up with your Nephrologist/renal doctor as outpatient for ongoing monitoring of your transplant kidney    Diagnosis: HLD (hyperlipidemia)  Assessment and Plan of Treatment: Please continue taking your cholesterol medication as prescribed and follow up with your doctor for routine blood work and including evaluation of your liver function while taking medication for your cholesterol.  Report any changes in the color of your skin such as yellowing of your skin, changes in the color of your urine, itching skin, cramps to your lower legs.      Diagnosis: HTN (hypertension)  Assessment and Plan of Treatment: You are being treated for high blood pressure.  Continue taking your medication as prescribed to help prevent or minimize your risk of end organ damage.  Follow up with your doctor for routine blood pressure evaluation and medication regimen.  Report any symptoms of headaces with nausea or vomiting, visual changes, heart palpitation, chest pain or shortness.       PRINCIPAL DISCHARGE DIAGNOSIS  Diagnosis: Sepsis due to urinary tract infection  Assessment and Plan of Treatment: Sepsis happens when an infection spreads and causes your body to react strongly to germs. Your body's defense system normally releases chemicals to fight off infection at the infected area. When infection spreads, chemicals are released throughout your body. The chemicals cause inflammation and clotting in small blood vessels that is difficult to control. Inflammation and clotting decrease blood flow and oxygen to your organs. This may cause your organs to stop working correctly. Sepsis is a life-threatening emergency.  if you have any pain or low grade temperature of 100.4F, you can take tylenol 650 mg every 6 hours as needed  Take all antibiotics as ordered.  Call you Health care provider upon arrival home to make a one week follow up appointment.  If you develop fever, chills, malaise, or change in mental status call your Health Care Provider or go to the Emergency Department.  Nutrition is important, eat small frequent meals to help ensure you get adequate calories.  Do not stay in bed all day!  Increase your activity daily as tolerated.        SECONDARY DISCHARGE DIAGNOSES  Diagnosis: Acute UTI  Assessment and Plan of Treatment: Continue taking your antibiotics as prescribed and monitor for signs and symptoms of infection, such as fever or chills, burning/pain with urination, urinary frequency/hesitancy, cloudy or bloody urine.      Diagnosis: SAVAGE (acute kidney injury)  Assessment and Plan of Treatment: You were noted with abnormal kidney function level likely due to acute urinary infection. You were given some light IV fluids. Your kidney numbers are improving gradually.   Avoid taking (NSAIDs) - (ex: Ibuprofen, Advil, Celebrex, Naprosyn)  Avoid taking any nephrotoxic agents (can harm kidneys) - Intravenous contrast for diagnostic testing, combination cold medications.  Have all medications adjusted for your renal function by your Health Care Provider.  Blood pressure control is important.  Take all medication as prescribed.      Diagnosis: HTN (hypertension)  Assessment and Plan of Treatment: You are being treated for high blood pressure.  Continue taking your medication as prescribed to help prevent or minimize your risk of end organ damage.  Follow up with your doctor for routine blood pressure evaluation and medication regimen.  Report any symptoms of headaces with nausea or vomiting, visual changes, heart palpitation, chest pain or shortness.      Diagnosis: Renal transplant, status post  Assessment and Plan of Treatment: Please continue to take your transplant medication as prescribed and make sure to follow up with your Nephrologist/renal doctor as outpatient for ongoing monitoring of your transplant kidney    Diagnosis: HLD (hyperlipidemia)  Assessment and Plan of Treatment: Please continue taking your cholesterol medication as prescribed and follow up with your doctor for routine blood work and including evaluation of your liver function while taking medication for your cholesterol.  Report any changes in the color of your skin such as yellowing of your skin, changes in the color of your urine, itching skin, cramps to your lower legs.      Diagnosis: CAD (coronary artery disease)  Assessment and Plan of Treatment: CAD is narrowing of the arteries to your heart caused by a buildup of plaque (cholesterol and other substances). The narrowing in your arteries decreases the amount of blood that can flow to your heart. This causes your heart to get less oxygen, which may be life-threatening.  What increases my risk for CAD?  Age 45 years or older  Obesity or lack of exercise  Poor nutrition  A family history of CAD  Smoking or regular exposure to secondhand smoke  A medical condition, such as high blood pressure, high cholesterol, or diabetes  Alcohol use  Stress  Eat heart-healthy foods. Include fresh fruits and vegetables in your meal plan. Choose low-fat foods, such as skim or 1% fat milk, low-fat cheese and yogurt, fish, chicken (without skin), and lean meats. Eat two 4-ounce servings of fish high in omega-3 fats each week, such as salmon, fresh tuna, and herring.      Diagnosis: Sepsis due to urinary tract infection  Assessment and Plan of Treatment: Sepsis happens when an infection spreads and causes your body to react strongly to germs. Your body's defense system normally releases chemicals to fight off infection at the infected area. When infection spreads, chemicals are released throughout your body. The chemicals cause inflammation and clotting in small blood vessels that is difficult to control. Inflammation and clotting decrease blood flow and oxygen to your organs. This may cause your organs to stop working correctly. Sepsis is a life-threatening emergency.  if you have any pain or low grade temperature of 100.4F, you can take tylenol 650 mg every 6 hours as needed  Take all antibiotics as ordered.  Call you Health care provider upon arrival home to make a one week follow up appointment.  If you develop fever, chills, malaise, or change in mental status call your Health Care Provider or go to the Emergency Department.  Nutrition is important, eat small frequent meals to help ensure you get adequate calories.  Do not stay in bed all day!  Increase your activity daily as tolerated.       PRINCIPAL DISCHARGE DIAGNOSIS  Diagnosis: Sepsis due to urinary tract infection  Assessment and Plan of Treatment: You were admitted and treated for sepsis caused by urinary tract infection (UTI)  Sepsis happens when an infection spreads and causes your body to react strongly to germs. Your body's defense system normally releases chemicals to fight off infection at the infected area. When infection spreads, chemicals are released throughout your body. The chemicals cause inflammation and clotting in small blood vessels that is difficult to control. Inflammation and clotting decrease blood flow and oxygen to your organs. This may cause your organs to stop working correctly. Sepsis is a life-threatening emergency.  if you have any pain or low grade temperature of 100.4F, you can take tylenol 650 mg every 6 hours as needed  Take all antibiotics as ordered.   Call you Health care provider upon arrival home to make a one week follow up appointment.  If you develop fever, chills, malaise, or change in mental status call your Health Care Provider or go to the Emergency Department.  Nutrition is important, eat small frequent meals to help ensure you get adequate calories.  Do not stay in bed all day!  Increase your activity daily as tolerated.        SECONDARY DISCHARGE DIAGNOSES  Diagnosis: Acute UTI  Assessment and Plan of Treatment: Continue taking your antibiotics as prescribed and monitor for signs and symptoms of infection, such as fever or chills, burning/pain with urination, urinary frequency/hesitancy, cloudy or bloody urine.      Diagnosis: SAVAGE (acute kidney injury)  Assessment and Plan of Treatment: You were noted with abnormal kidney function level likely due to acute urinary infection. You were given some light IV fluids. Your kidney numbers have improved from admission level of 2.10 to 1.52     Continue to follow up with your renal doctor.   Have all medications adjusted for your renal function by your Health Care Provider.  Blood pressure control is important.  Take all medication as prescribed.      Diagnosis: HTN (hypertension)  Assessment and Plan of Treatment: You are being treated for high blood pressure.  Continue taking your medication as prescribed to help prevent or minimize your risk of end organ damage.  Follow up with your doctor for routine blood pressure evaluation and medication regimen.  Report any symptoms of headaces with nausea or vomiting, visual changes, heart palpitation, chest pain or shortness.      Diagnosis: Renal transplant, status post  Assessment and Plan of Treatment: Please continue to take your transplant medication as prescribed and make sure to follow up with your Nephrologist/renal doctor as outpatient for ongoing monitoring of your transplant kidney    Diagnosis: HLD (hyperlipidemia)  Assessment and Plan of Treatment: Please continue taking your cholesterol medication as prescribed and follow up with your doctor for routine blood work and including evaluation of your liver function while taking medication for your cholesterol.  Report any changes in the color of your skin such as yellowing of your skin, changes in the color of your urine, itching skin, cramps to your lower legs.      Diagnosis: CAD (coronary artery disease)  Assessment and Plan of Treatment: CAD is narrowing of the arteries to your heart caused by a buildup of plaque (cholesterol and other substances). The narrowing in your arteries decreases the amount of blood that can flow to your heart. This causes your heart to get less oxygen, which may be life-threatening.  What increases my risk for CAD?  Age 45 years or older  Obesity or lack of exercise  Poor nutrition  A family history of CAD  Smoking or regular exposure to secondhand smoke  A medical condition, such as high blood pressure, high cholesterol, or diabetes  Alcohol use  Stress  Eat heart-healthy foods. Include fresh fruits and vegetables in your meal plan. Choose low-fat foods, such as skim or 1% fat milk, low-fat cheese and yogurt, fish, chicken (without skin), and lean meats. Eat two 4-ounce servings of fish high in omega-3 fats each week, such as salmon, fresh tuna, and herring.      Diagnosis: Sepsis due to urinary tract infection  Assessment and Plan of Treatment: Sepsis happens when an infection spreads and causes your body to react strongly to germs. Your body's defense system normally releases chemicals to fight off infection at the infected area. When infection spreads, chemicals are released throughout your body. The chemicals cause inflammation and clotting in small blood vessels that is difficult to control. Inflammation and clotting decrease blood flow and oxygen to your organs. This may cause your organs to stop working correctly. Sepsis is a life-threatening emergency.  if you have any pain or low grade temperature of 100.4F, you can take tylenol 650 mg every 6 hours as needed  Take all antibiotics as ordered.  Call you Health care provider upon arrival home to make a one week follow up appointment.  If you develop fever, chills, malaise, or change in mental status call your Health Care Provider or go to the Emergency Department.  Nutrition is important, eat small frequent meals to help ensure you get adequate calories.  Do not stay in bed all day!  Increase your activity daily as tolerated.

## 2025-03-29 NOTE — DISCHARGE NOTE PROVIDER - PROVIDER TOKENS
PROVIDER:[TOKEN:[[id number]]] PROVIDER:[TOKEN:[[id number]]],PROVIDER:[TOKEN:[[id number]]],FREE:[LAST:[Dr. Davis],FIRST:[Jamie],PHONE:[phone]],FAX:[(   )    -]]

## 2025-03-29 NOTE — PROGRESS NOTE ADULT - SUBJECTIVE AND OBJECTIVE BOX
MEDICAL ATTENDING NOTE  Patient is a 70y old  Male who presents with a chief complaint of sepsis 2/2 uti (recurrent) (28 Mar 2025 21:43)      HPI:  70y M with PMH of HTN, HLD, CAD w/ stents, MTHFR mutation, Shingles, IgA nephropathy leading to ESRD previously on HD, now s/p renal transplant in  presenting with fever, chills, and dysuria. Per son providing collateral and [employer] translation at bedside, UTIs and current symptoms have been a recurring issue since early February. At that time patient came to ED for symptoms but deferred admission opting for discharge home with oral antibiotics. Patient reports experiencing UTI symptoms every other week, including fever, right flank pain, urinary frequency and dysuria. Current episode notable for more severe symptoms, including shaking chills interfering with daily activities and significant weakness. Describes cyclical pattern: fever onset triggers frequent urination and severe chills. Takes Tylenol for relief, but fever returns once medication wears off. Reports pain during urination, especially when febrile, and sensation of incomplete bladder emptying during febrile episodes. Patient notes some nausea but denies vomiting, shortness of breath, cough, chest pain, or palpitations. Reports significant weight loss and decreased appetite recently. Patient still adheres to renal-restricted diet and denies alcohol consumption, smoking, or illicit drug use. Can walk independently but has limitations with long distances. Recent antibiotic treatments for recurring UTIs have not fully resolved the issue. Review of systems positive for fever, chills, weakness, nausea, frequent urination, dysuria, and sensation of incomplete bladder emptying. Negative for rash, chest pain, palpitations, shortness of breath, cough, vomiting, abdominal pain, diarrhea, constipation, dizziness, and lightheadedness.      In the ED:  T(F): , Max: 99 (18:38); HR:  (84 - 94); BP:  (98/52 - 108/52); RR:  (14 - 17); SpO2:  (94% - 99%)  WBC:18.65, Hb.1, PLT:198  Na:137, K:4.1, Cl:110, HCO3:21, BUN:45, sCr:2.10, Lactate: 0.8  AST:17, ALT:20, ALP:70, Tbili:0.8, Lipase:--     s/p acetaminophen     Tablet .. 650 milliGRAM(s); atorvastatin 40 milliGRAM(s); cefTRIAXone   IVPB 1000 milliGRAM(s); lactated ringers Bolus 1000 milliLiter(s); mycophenolate mofetil 500 milliGRAM(s); tamsulosin 0.4 milliGRAM(s) (28 Mar 2025 21:43)      INTERVAL HPI/OVERNIGHT EVENTS: feeling slightly better    MEDICATIONS  (STANDING):  aspirin  chewable 81 milliGRAM(s) Oral daily  atorvastatin 40 milliGRAM(s) Oral at bedtime  cefepime   IVPB 2000 milliGRAM(s) IV Intermittent every 24 hours  entecavir 0.5 milliGRAM(s) Oral daily  heparin   Injectable 5000 Unit(s) SubCutaneous every 12 hours  influenza  Vaccine (HIGH DOSE) 0.5 milliLiter(s) IntraMuscular once  mycophenolate mofetil 500 milliGRAM(s) Oral two times a day  predniSONE   Tablet 5 milliGRAM(s) Oral daily  tacrolimus ER Tablet (ENVARSUS XR) 2 milliGRAM(s) Oral daily  tamsulosin 0.4 milliGRAM(s) Oral at bedtime    MEDICATIONS  (PRN):  acetaminophen     Tablet .. 650 milliGRAM(s) Oral every 6 hours PRN Temp greater or equal to 38C (100.4F), Mild Pain (1 - 3)      __________________________________________________  REVIEW OF SYSTEMS:    CONSTITUTIONAL: No fever,   EYES: no acute visual disturbances  NECK: No pain or stiffness  RESPIRATORY: No cough; No shortness of breath  CARDIOVASCULAR: No chest pain, no palpitations  GASTROINTESTINAL: No pain. No nausea or vomiting; No diarrhea   NEUROLOGICAL: No headache or numbness, no tremors  MUSCULOSKELETAL: No joint pain, no muscle pain  GENITOURINARY: no dysuria, no frequency, no hesitancy  PSYCHIATRY: no depression , no anxiety  ALL OTHER  ROS negative        Vital Signs Last 24 Hrs  T(C): 37.3 (29 Mar 2025 14:19), Max: 37.4 (29 Mar 2025 05:16)  T(F): 99.1 (29 Mar 2025 14:19), Max: 99.4 (29 Mar 2025 05:16)  HR: 77 (29 Mar 2025 14:19) (77 - 94)  BP: 110/61 (29 Mar 2025 14:19) (94/49 - 118/57)  BP(mean): 75 (29 Mar 2025 09:11) (75 - 75)  RR: 16 (29 Mar 2025 14:19) (14 - 17)  SpO2: 95% (29 Mar 2025 14:19) (94% - 99%)    Parameters below as of 29 Mar 2025 14:19  Patient On (Oxygen Delivery Method): room air        ________________________________________________  PHYSICAL EXAM:  GENERAL: NAD  HEENT: Normocephalic;  conjunctivae and sclerae clear; moist mucous membranes;   NECK : supple  CHEST/LUNG: Clear to auscultation bilaterally with good air entry   HEART: S1 S2  regular; no murmurs, gallops or rubs  ABDOMEN: Soft, Nontender, Nondistended; Bowel sounds present  EXTREMITIES: no cyanosis; no edema; no calf tenderness  SKIN: warm and dry; no rash  NERVOUS SYSTEM:  Awake and alert; Oriented  to place, person and time ; no new deficits    _________________________________________________  LABS:                        11.9   7.81  )-----------( 311      ( 29 Mar 2025 06:36 )             35.8     03    136  |  100  |  42[H]  ----------------------------<  120[H]  4.1   |  30  |  0.75    Ca    9.2      29 Mar 2025 06:36    TPro  6.6  /  Alb  3.0[L]  /  TBili  0.2  /  DBili  x   /  AST  36  /  ALT  41  /  AlkPhos  62  03-      Urinalysis Basic - ( 29 Mar 2025 13:20 )    Color: Yellow / Appearance: Clear / S.015 / pH: x  Gluc: x / Ketone: Negative mg/dL  / Bili: Negative / Urobili: 1.0 mg/dL   Blood: x / Protein: 30 mg/dL / Nitrite: Negative   Leuk Esterase: Moderate / RBC: 3 /HPF / WBC 20 /HPF   Sq Epi: x / Non Sq Epi: x / Bacteria: Few /HPF      CAPILLARY BLOOD GLUCOSE

## 2025-03-29 NOTE — DISCHARGE NOTE PROVIDER - HOSPITAL COURSE
Pt is a 70y M with PMH of HTN, HLD, CAD w/ stents, MTHFR mutation, Shingles, IgA nephropathy leading to ESRD previously on HD, now s/p renal transplant in  presenting with fever, chills, and dysuria. Per son providing collateral and Moroccan translation at bedside, UTIs and current symptoms have been a recurring issue since early February. At that time patient came to ED for symptoms but deferred admission opting for discharge home with oral antibiotics. Patient reports experiencing UTI symptoms every other week, including fever, right flank pain, urinary frequency and dysuria. Current episode notable for more severe symptoms, including shaking chills interfering with daily activities and significant weakness. Describes cyclical pattern: fever onset triggers frequent urination and severe chills. Takes Tylenol for relief, but fever returns once medication wears off. Reports pain during urination, especially when febrile, and sensation of incomplete bladder emptying during febrile episodes. Patient notes some nausea but denies vomiting, shortness of breath, cough, chest pain, or palpitations. Reports significant weight loss and decreased appetite recently. Patient still adheres to renal-restricted diet and denies alcohol consumption, smoking, or illicit drug use. Can walk independently but has limitations with long distances. Recent antibiotic treatments for recurring UTIs have not fully resolved the issue. Review of systems positive for fever, chills, weakness, nausea, frequent urination, dysuria, and sensation of incomplete bladder emptying. Negative for rash, chest pain, palpitations, shortness of breath, cough, vomiting, abdominal pain, diarrhea, constipation, dizziness, and lightheadedness.      In the ED:  T(F): , Max: 99 (18:38); HR:  (84 - 94); BP:  (98/52 - 108/52); RR:  (14 - 17); SpO2:  (94% - 99%)  WBC:18.65, Hb.1, PLT:198  Na:137, K:4.1, Cl:110, HCO3:21, BUN:45, sCr:2.10, Lactate: 0.8  AST:17, ALT:20, ALP:70, Tbili:0.8, Lipase:--     His UA was + for large leukocytosis and pt was admitted for sepsis 2/2 UTI, being treated with Cefepime.    INCOMPLETE::::::::::::::  Pending blood and urine cultures     Pt is a 70y M with PMH of HTN, HLD, CAD w/ stents, MTHFR mutation, Shingles, IgA nephropathy leading to ESRD previously on HD, now s/p renal transplant in 2021 presenting with fever, chills, and dysuria. Per son providing collateral and [place]ambican translation at bedside, UTIs and current symptoms have been a recurring issue since early February. At that time patient came to ED for symptoms but deferred admission opting for discharge home with oral antibiotics. Patient reports experiencing UTI symptoms every other week, including fever, right flank pain, urinary frequency and dysuria. Current episode notable for more severe symptoms, including shaking chills interfering with daily activities and significant weakness. His UA was + for large leukocytosis and pt was admitted for sepsis 2/2 UTI, being treated with Cefepime. Urine culture grew E.Coli. Sergio meléndez. ID consulted, antibiotics changed to Ceftriazone. Patient was also noted with SAVAGE, likely due to acute infection. IV fluids was given. Renal ultrasound shows  ?????????????????//  Patient was also seen by nephrologist for his renal transplant history. Pt was continued on immunosuppressant with PO steroids.     ID recommends to discharge pt home on oral antibiotics until ???????????    Discussed with attending, patient is optimized for discharge. Pt is a 70y M with PMH of HTN, HLD, CAD w/ stents, MTHFR mutation, Shingles, IgA nephropathy leading to ESRD previously on HD, now s/p renal transplant in 2021 presenting with fever, chills, and dysuria. Per son providing collateral and Gabonese translation at bedside, UTIs and current symptoms have been a recurring issue since early February. At that time patient came to ED for symptoms but deferred admission opting for discharge home with oral antibiotics. Patient reports experiencing UTI symptoms every other week, including fever, right flank pain, urinary frequency and dysuria. Current episode notable for more severe symptoms, including shaking chills interfering with daily activities and significant weakness. His UA was + for large leukocytosis and pt was admitted for sepsis 2/2 UTI, being treated with Cefepime. Urine culture grew E.Coli. Sergio meléndez. ID consulted, antibiotics changed to Ceftriazone. Patient was also noted with SAVAGE, likely due to acute infection. IV fluids was given. Renal ultrasound shows left lower renal transplant with no hydronephrosis.   Patient was also seen by nephrologist for his renal transplant history. Pt was continued on immunosuppressant with PO steroids.     ID recommends to discharge pt home on oral antibiotics cefpodoxime 400mg p.o. bid and cont. for 8d  Discussed with attending, patient is optimized for discharge.

## 2025-03-30 LAB
ALBUMIN SERPL ELPH-MCNC: 2.4 G/DL — LOW (ref 3.5–5)
ALP SERPL-CCNC: 79 U/L — SIGNIFICANT CHANGE UP (ref 40–120)
ALT FLD-CCNC: 18 U/L DA — SIGNIFICANT CHANGE UP (ref 10–60)
ANION GAP SERPL CALC-SCNC: 7 MMOL/L — SIGNIFICANT CHANGE UP (ref 5–17)
AST SERPL-CCNC: 16 U/L — SIGNIFICANT CHANGE UP (ref 10–40)
BILIRUB SERPL-MCNC: 0.5 MG/DL — SIGNIFICANT CHANGE UP (ref 0.2–1.2)
BUN SERPL-MCNC: 32 MG/DL — HIGH (ref 7–18)
CALCIUM SERPL-MCNC: 9 MG/DL — SIGNIFICANT CHANGE UP (ref 8.4–10.5)
CHLORIDE SERPL-SCNC: 110 MMOL/L — HIGH (ref 96–108)
CO2 SERPL-SCNC: 19 MMOL/L — LOW (ref 22–31)
CREAT SERPL-MCNC: 1.71 MG/DL — HIGH (ref 0.5–1.3)
EGFR: 43 ML/MIN/1.73M2 — LOW
EGFR: 43 ML/MIN/1.73M2 — LOW
GLUCOSE SERPL-MCNC: 112 MG/DL — HIGH (ref 70–99)
HCT VFR BLD CALC: 35.8 % — LOW (ref 39–50)
HGB BLD-MCNC: 11.8 G/DL — LOW (ref 13–17)
MAGNESIUM SERPL-MCNC: 1.8 MG/DL — SIGNIFICANT CHANGE UP (ref 1.6–2.6)
MCHC RBC-ENTMCNC: 28.6 PG — SIGNIFICANT CHANGE UP (ref 27–34)
MCHC RBC-ENTMCNC: 33 G/DL — SIGNIFICANT CHANGE UP (ref 32–36)
MCV RBC AUTO: 86.7 FL — SIGNIFICANT CHANGE UP (ref 80–100)
NRBC BLD AUTO-RTO: 0 /100 WBCS — SIGNIFICANT CHANGE UP (ref 0–0)
PHOSPHATE SERPL-MCNC: 2.2 MG/DL — LOW (ref 2.5–4.5)
PLATELET # BLD AUTO: 183 K/UL — SIGNIFICANT CHANGE UP (ref 150–400)
POTASSIUM SERPL-MCNC: 3.8 MMOL/L — SIGNIFICANT CHANGE UP (ref 3.5–5.3)
POTASSIUM SERPL-SCNC: 3.8 MMOL/L — SIGNIFICANT CHANGE UP (ref 3.5–5.3)
PROT SERPL-MCNC: 6.3 G/DL — SIGNIFICANT CHANGE UP (ref 6–8.3)
RBC # BLD: 4.13 M/UL — LOW (ref 4.2–5.8)
RBC # FLD: 13.7 % — SIGNIFICANT CHANGE UP (ref 10.3–14.5)
SODIUM SERPL-SCNC: 136 MMOL/L — SIGNIFICANT CHANGE UP (ref 135–145)
TACROLIMUS SERPL-MCNC: 9 NG/ML — SIGNIFICANT CHANGE UP
WBC # BLD: 9.44 K/UL — SIGNIFICANT CHANGE UP (ref 3.8–10.5)
WBC # FLD AUTO: 9.44 K/UL — SIGNIFICANT CHANGE UP (ref 3.8–10.5)

## 2025-03-30 PROCEDURE — 99232 SBSQ HOSP IP/OBS MODERATE 35: CPT

## 2025-03-30 PROCEDURE — 76775 US EXAM ABDO BACK WALL LIM: CPT | Mod: 26

## 2025-03-30 RX ADMIN — ENTECAVIR 0.5 MILLIGRAM(S): 0.5 TABLET ORAL at 11:53

## 2025-03-30 RX ADMIN — CEFEPIME 100 MILLIGRAM(S): 2 INJECTION, POWDER, FOR SOLUTION INTRAVENOUS at 05:00

## 2025-03-30 RX ADMIN — Medication 81 MILLIGRAM(S): at 11:53

## 2025-03-30 RX ADMIN — ATORVASTATIN CALCIUM 40 MILLIGRAM(S): 80 TABLET, FILM COATED ORAL at 21:16

## 2025-03-30 RX ADMIN — MYCOPHENOLATE MOFETIL 500 MILLIGRAM(S): 500 TABLET, FILM COATED ORAL at 17:34

## 2025-03-30 RX ADMIN — HEPARIN SODIUM 5000 UNIT(S): 1000 INJECTION INTRAVENOUS; SUBCUTANEOUS at 17:31

## 2025-03-30 RX ADMIN — Medication 650 MILLIGRAM(S): at 01:14

## 2025-03-30 RX ADMIN — TAMSULOSIN HYDROCHLORIDE 0.4 MILLIGRAM(S): 0.4 CAPSULE ORAL at 21:17

## 2025-03-30 RX ADMIN — Medication 650 MILLIGRAM(S): at 00:48

## 2025-03-30 RX ADMIN — PREDNISONE 5 MILLIGRAM(S): 20 TABLET ORAL at 05:00

## 2025-03-30 RX ADMIN — HEPARIN SODIUM 5000 UNIT(S): 1000 INJECTION INTRAVENOUS; SUBCUTANEOUS at 05:00

## 2025-03-30 RX ADMIN — MYCOPHENOLATE MOFETIL 500 MILLIGRAM(S): 500 TABLET, FILM COATED ORAL at 05:00

## 2025-03-30 RX ADMIN — TACROLIMUS 2 MILLIGRAM(S): 0.5 CAPSULE ORAL at 07:31

## 2025-03-30 NOTE — PROGRESS NOTE ADULT - SUBJECTIVE AND OBJECTIVE BOX
MEDICAL ATTENDING NOTE  Patient is a 70y old  Male who presents with a chief complaint of sepsis 2/2 uti (recurrent) (30 Mar 2025 12:32)      HPI:  70y M with PMH of HTN, HLD, CAD w/ stents, MTHFR mutation, Shingles, IgA nephropathy leading to ESRD previously on HD, now s/p renal transplant in  presenting with fever, chills, and dysuria. Per son providing collateral and Haitian translation at bedside, UTIs and current symptoms have been a recurring issue since early February. At that time patient came to ED for symptoms but deferred admission opting for discharge home with oral antibiotics. Patient reports experiencing UTI symptoms every other week, including fever, right flank pain, urinary frequency and dysuria. Current episode notable for more severe symptoms, including shaking chills interfering with daily activities and significant weakness. Describes cyclical pattern: fever onset triggers frequent urination and severe chills. Takes Tylenol for relief, but fever returns once medication wears off. Reports pain during urination, especially when febrile, and sensation of incomplete bladder emptying during febrile episodes. Patient notes some nausea but denies vomiting, shortness of breath, cough, chest pain, or palpitations. Reports significant weight loss and decreased appetite recently. Patient still adheres to renal-restricted diet and denies alcohol consumption, smoking, or illicit drug use. Can walk independently but has limitations with long distances. Recent antibiotic treatments for recurring UTIs have not fully resolved the issue. Review of systems positive for fever, chills, weakness, nausea, frequent urination, dysuria, and sensation of incomplete bladder emptying. Negative for rash, chest pain, palpitations, shortness of breath, cough, vomiting, abdominal pain, diarrhea, constipation, dizziness, and lightheadedness.      In the ED:  T(F): , Max: 99 (18:38); HR:  (84 - 94); BP:  (98/52 - 108/52); RR:  (14 - 17); SpO2:  (94% - 99%)  WBC:18.65, Hb.1, PLT:198  Na:137, K:4.1, Cl:110, HCO3:21, BUN:45, sCr:2.10, Lactate: 0.8  AST:17, ALT:20, ALP:70, Tbili:0.8, Lipase:--     s/p acetaminophen     Tablet .. 650 milliGRAM(s); atorvastatin 40 milliGRAM(s); cefTRIAXone   IVPB 1000 milliGRAM(s); lactated ringers Bolus 1000 milliLiter(s); mycophenolate mofetil 500 milliGRAM(s); tamsulosin 0.4 milliGRAM(s) (28 Mar 2025 21:43)      INTERVAL HPI/OVERNIGHT EVENTS: no new complaints    MEDICATIONS  (STANDING):  aspirin  chewable 81 milliGRAM(s) Oral daily  atorvastatin 40 milliGRAM(s) Oral at bedtime  cefepime   IVPB 2000 milliGRAM(s) IV Intermittent every 24 hours  entecavir 0.5 milliGRAM(s) Oral daily  heparin   Injectable 5000 Unit(s) SubCutaneous every 12 hours  influenza  Vaccine (HIGH DOSE) 0.5 milliLiter(s) IntraMuscular once  mycophenolate mofetil 500 milliGRAM(s) Oral two times a day  predniSONE   Tablet 5 milliGRAM(s) Oral daily  tacrolimus ER Tablet (ENVARSUS XR) 2 milliGRAM(s) Oral daily  tamsulosin 0.4 milliGRAM(s) Oral at bedtime    MEDICATIONS  (PRN):  acetaminophen     Tablet .. 650 milliGRAM(s) Oral every 6 hours PRN Temp greater or equal to 38C (100.4F), Mild Pain (1 - 3)      __________________________________________________  REVIEW OF SYSTEMS:    CONSTITUTIONAL: No fever,   EYES: no acute visual disturbances  NECK: No pain or stiffness  RESPIRATORY: No cough; No shortness of breath  CARDIOVASCULAR: No chest pain, no palpitations  GASTROINTESTINAL: No pain. No nausea or vomiting; No diarrhea   NEUROLOGICAL: No headache or numbness, no tremors  MUSCULOSKELETAL: No joint pain, no muscle pain  GENITOURINARY: no dysuria, no frequency, no hesitancy  PSYCHIATRY: no depression , no anxiety  ALL OTHER  ROS negative        Vital Signs Last 24 Hrs  T(C): 36.8 (30 Mar 2025 13:59), Max: 37.3 (29 Mar 2025 17:45)  T(F): 98.3 (30 Mar 2025 13:59), Max: 99.1 (29 Mar 2025 17:45)  HR: 82 (30 Mar 2025 13:59) (71 - 85)  BP: 105/62 (30 Mar 2025 13:59) (105/62 - 141/69)  BP(mean): 82 (29 Mar 2025 21:26) (82 - 82)  RR: 17 (30 Mar 2025 13:59) (16 - 18)  SpO2: 95% (30 Mar 2025 13:59) (93% - 95%)    Parameters below as of 30 Mar 2025 13:59  Patient On (Oxygen Delivery Method): room air        ________________________________________________  PHYSICAL EXAM:  GENERAL: NAD  HEENT: Normocephalic;  conjunctivae and sclerae clear; moist mucous membranes;   NECK : supple  CHEST/LUNG: Clear to auscultation bilaterally with good air entry   HEART: S1 S2  regular; no murmurs, gallops or rubs  ABDOMEN: Soft, Nontender, Nondistended; Bowel sounds present  EXTREMITIES: no cyanosis; no edema; no calf tenderness  SKIN: warm and dry; no rash  NERVOUS SYSTEM:  Awake and alert; Oriented  to place, person and time ; no new deficits    _________________________________________________  LABS:                        11.8   9.44  )-----------( 183      ( 30 Mar 2025 07:55 )             35.8     03-30    136  |  110[H]  |  32[H]  ----------------------------<  112[H]  3.8   |  19[L]  |  1.71[H]    Ca    9.0      30 Mar 2025 07:55  Phos  2.2     03-30  Mg     1.8     03-30    TPro  6.3  /  Alb  2.4[L]  /  TBili  0.5  /  DBili  x   /  AST  16  /  ALT  18  /  AlkPhos  79  03-30      Urinalysis Basic - ( 30 Mar 2025 07:55 )    Color: x / Appearance: x / SG: x / pH: x  Gluc: 112 mg/dL / Ketone: x  / Bili: x / Urobili: x   Blood: x / Protein: x / Nitrite: x   Leuk Esterase: x / RBC: x / WBC x   Sq Epi: x / Non Sq Epi: x / Bacteria: x      CAPILLARY BLOOD GLUCOSE

## 2025-03-30 NOTE — PROGRESS NOTE ADULT - ATTENDING COMMENTS
70 year old man with medical hx including CAD, HTN, BPH, IgA nephropathy, ESRD s/p renal  transplant who comes in with fever and urinary symptoms with + UA concerning for UTI. OF note patient had UTI with pansensitive E. coli that was treated.  Unsure why patient is having a recurrence especially this close to recent treatment.   Would image the kidney with CT with contrast but the risk of DONTRELL vs benefit is high.    Alert, cooperative man in NAD  Vital Signs Last 24 Hrs  T(C): 36.8 (30 Mar 2025 13:59), Max: 37.3 (29 Mar 2025 17:45)  T(F): 98.3 (30 Mar 2025 13:59), Max: 99.1 (29 Mar 2025 17:45)  HR: 82 (30 Mar 2025 13:59) (71 - 85)  BP: 105/62 (30 Mar 2025 13:59) (105/62 - 141/69)  BP(mean): 82 (29 Mar 2025 21:26) (82 - 82)  RR: 17 (30 Mar 2025 13:59) (16 - 18)  SpO2: 95% (30 Mar 2025 13:59) (93% - 95%)    Parameters below as of 30 Mar 2025 13:59  Patient On (Oxygen Delivery Method): room air    Lungs, clear  Cor, RRR  Abdomen, soft, non-tender  Neurological, intact  Vital Signs Last 24 Hrs  T(C): 36.8 (30 Mar 2025 13:59), Max: 37.3 (29 Mar 2025 17:45)  T(F): 98.3 (30 Mar 2025 13:59), Max: 99.1 (29 Mar 2025 17:45)  HR: 82 (30 Mar 2025 13:59) (71 - 85)  BP: 105/62 (30 Mar 2025 13:59) (105/62 - 141/69)  BP(mean): 82 (29 Mar 2025 21:26) (82 - 82)  RR: 17 (30 Mar 2025 13:59) (16 - 18)  SpO2: 95% (30 Mar 2025 13:59) (93% - 95%)    Parameters below as of 30 Mar 2025 13:59  Patient On (Oxygen Delivery Method): room air    White Blood Cell - Urine: 20 /HPF (03.29.25 @ 13:20)   >100,000 CFU/ml Escherichia coli  Susceptibility is pending  IMP: Recurrent, complicated UTI in a post-transplant patient         r/o esequiel-nephric abscess  Plan: Continue cefepime pending cultures          renal ultrasound  Discussed with patient and younger son, who is at the bedside.   Neprhology consultation, seen and appreciated.

## 2025-03-30 NOTE — CONSULT NOTE ADULT - SUBJECTIVE AND OBJECTIVE BOX
REYNA ABBOTT  Patient is a 70y old  Male who presents with a chief complaint of sepsis 2/2 uti (recurrent) (29 Mar 2025 16:16)    HPI:  70y M with PMH of HTN, HLD, CAD w/ stents, MTHFR mutation, Shingles, IgA nephropathy leading to ESRD previously on HD, now s/p renal transplant in 2021 presenting with fever, chills, and dysuria. Per son providing collateral and Lebanese translation at bedside, UTIs and current symptoms have been a recurring issue since early February.     PAST MEDICAL & SURGICAL HISTORY:  HTN (hypertension)      High cholesterol      IgA nephropathy      ESRD (end stage renal disease) on dialysis      MTHFR mutation      BPH (benign prostatic hyperplasia)      History of renal transplant      S/P coronary artery stent placement        MEDICATIONS  (STANDING):  aspirin  chewable 81 milliGRAM(s) Oral daily  atorvastatin 40 milliGRAM(s) Oral at bedtime  cefepime   IVPB 2000 milliGRAM(s) IV Intermittent every 24 hours  entecavir 0.5 milliGRAM(s) Oral daily  heparin   Injectable 5000 Unit(s) SubCutaneous every 12 hours  influenza  Vaccine (HIGH DOSE) 0.5 milliLiter(s) IntraMuscular once  mycophenolate mofetil 500 milliGRAM(s) Oral two times a day  predniSONE   Tablet 5 milliGRAM(s) Oral daily  tacrolimus ER Tablet (ENVARSUS XR) 2 milliGRAM(s) Oral daily  tamsulosin 0.4 milliGRAM(s) Oral at bedtime    Allergies    No Known Allergies    Intolerances      FAMILY HISTORY:      REVIEW OF SYSTEMS    General:  Fever and chills.    Ophthalmologic: No changes in vision.  	  ENMT: No difficulty swallowing. 	    Respiratory and Thorax: No cough, wheezes or dyspnea.  	  Cardiovascular: No chest pains, tiredness or palpitations.	    Gastrointestinal: No dyspepsia, constipation or diarrhea.	    Genitourinary:	Dysuria.    Musculoskeletal: No joint pains or swelling. No muscle pains.    Neurological:	No weakness or numbness. No seizures.    Hematology/Lymphatics: No heat or cold intolerance.    Endocrine: No polyuria or polydipsia.	      Vital Signs Last 24 Hrs  T(C): 36.7 (30 Mar 2025 04:29), Max: 37.3 (29 Mar 2025 14:19)  T(F): 98 (30 Mar 2025 04:29), Max: 99.1 (29 Mar 2025 14:19)  HR: 71 (30 Mar 2025 04:29) (71 - 85)  BP: 120/66 (30 Mar 2025 04:29) (110/61 - 141/69)  BP(mean): 82 (29 Mar 2025 21:26) (82 - 82)  RR: 18 (30 Mar 2025 04:29) (16 - 18)  SpO2: 94% (30 Mar 2025 04:29) (93% - 95%)    Parameters below as of 30 Mar 2025 04:29  Patient On (Oxygen Delivery Method): room air        PHYSICAL EXAMINATION:  Constitutional: He  appears comfortable and not distressed. Not diaphoretic.    Neck:  The thyroid is normal. Trachea is midline.     Breasts: Normal examination.    Respiratory: The lungs are clear to auscultation. No dullness and expansion is normal.    Cardiovascular: S1 and S2 are normal. No mummurs, rubs or gallops are present.    Gastrointestinal: The abdomen is soft. No tenderness is present. No masses are present. Bowel sounds are normal.    Genitourinary: The bladder is not distended. No CVA tenderness is present.    Extremities: No edema is noted. No deformities are present.    Neurological: Cognition is normal. Tone, power and sensation are normal. Gait is steady.    Skin: No leasions are seen  or palpated.    Lymph Nodes: No lymphadenopathy is present.    Psychiatric: Mood is appropriate. No hallucinations or flight of ideas are noted.                            11.8   9.44  )-----------( 183      ( 30 Mar 2025 07:55 )             35.8     03-30    136  |  110[H]  |  32[H]  ----------------------------<  112[H]  3.8   |  19[L]  |  1.71[H]    Ca    9.0      30 Mar 2025 07:55  Phos  2.2     03-30  Mg     1.8     03-30    TPro  6.3  /  Alb  2.4[L]  /  TBili  0.5  /  DBili  x   /  AST  16  /  ALT  18  /  AlkPhos  79  03-30    LIVER FUNCTIONS - ( 30 Mar 2025 07:55 )  Alb: 2.4 g/dL / Pro: 6.3 g/dL / ALK PHOS: 79 U/L / ALT: 18 U/L DA / AST: 16 U/L / GGT: x           Tacrolimus (), Serum: 9.0ng/mL (03.30.25 @ 07:55)     Creatinine: 1.71 mg/dL (03.30.25 @ 07:55)   Creatinine: 0.75 mg/dL (03.29.25 @ 06:36)   Creatinine: 2.10 mg/dL (03.28.25 @ 19:10)     Urinalysis (03.29.25 @ 13:20)   Glucose Qualitative, Urine: Negative mg/dL  Blood, Urine: Non Hemolyzed Trace  pH Urine: 6.0  Color: Yellow  Urine Appearance: Clear  Bilirubin: Negative  Ketone - Urine: Negative mg/dL  Specific Gravity: 1.015  Protein, Urine: 30 mg/dL  Urobilinogen: 1.0 mg/dL  Nitrite: Negative  Leukocyte Esterase Concentration: Moderate    White Blood Cell - Urine: 20 /HPF  Red Blood Cell - Urine: 3 /HPF  Bacteria: Few /HPF  Squamous Epithelial Cells: Present  Comment - Urine: Few Platelets Clump seen    Urine Microscopic-Add On (NC) (03.28.25 @ 19:10)   White Blood Cell - Urine: Too Numerous to count /HPF  Red Blood Cell - Urine: 16 /HPF  Bacteria: Too Numerous to count /HPF  Squamous Epithelial Cells: Present    Culture - Blood (03.28.25 @ 19:00)   Specimen Source: Blood Blood-Peripheral  Culture Results:   No growth at 24 hours    Culture - Urine (03.28.25 @ 19:10)   Specimen Source: Clean Catch Clean Catch (Midstream)  Culture Results:   >100,000 CFU/ml Escherichia coli    Culture - Urine (02.13.25 @ 17:58)   Specimen Source: Clean Catch  Culture Results:   No growth  Culture - Urine (02.10.25 @ 21:40)   - Amoxicillin/Clavulanic Acid: S <=8/4  - Ampicillin: S <=8 These ampicillin results predict results for amoxicillin  - Ampicillin/Sulbactam: S <=4/2  - Aztreonam: S <=4  - Cefazolin: S <=2 For uncomplicated UTI with K. pneumoniae, E. coli, or P. mirablis: BERNICE <=16 is sensitive and BERNICE >=32 is resistant. This also predicts results for oral agents cefaclor, cefdinir, cefpodoxime, cefprozil, cefuroxime axetil, cephalexin and locarbef for uncomplicated UTI. Note that some isolates may be susceptible to these agents while testing resistant to cefazolin.  - Cefepime: S <=2  - Cefoxitin: S <=8  - Ceftriaxone: S <=1  - Cefuroxime: S <=4  - Ciprofloxacin: S <=0.25  - Ertapenem: S <=0.5  - Gentamicin: S <=2  - Imipenem: S <=1  - Levofloxacin: S <=0.5  - Meropenem: S <=1  - Nitrofurantoin: S <=32 Should not be used to treat pyelonephritis  - Piperacillin/Tazobactam: S <=8  - Tobramycin: S <=2  - Trimethoprim/Sulfamethoxazole: S <=0.5/9.5  Specimen Source: Clean Catch  Culture Results:   >100,000 CFU/ml Escherichia coli  Organism Identification: Escherichia coli  Organism: Escherichia coli  Method Type: BERNICE

## 2025-03-31 DIAGNOSIS — N39.0 URINARY TRACT INFECTION, SITE NOT SPECIFIED: ICD-10-CM

## 2025-03-31 DIAGNOSIS — Z75.8 OTHER PROBLEMS RELATED TO MEDICAL FACILITIES AND OTHER HEALTH CARE: ICD-10-CM

## 2025-03-31 LAB
-  AMOXICILLIN/CLAVULANIC ACID: SIGNIFICANT CHANGE UP
-  AMPICILLIN/SULBACTAM: SIGNIFICANT CHANGE UP
-  AMPICILLIN: SIGNIFICANT CHANGE UP
-  AZTREONAM: SIGNIFICANT CHANGE UP
-  CEFAZOLIN: SIGNIFICANT CHANGE UP
-  CEFEPIME: SIGNIFICANT CHANGE UP
-  CEFOXITIN: SIGNIFICANT CHANGE UP
-  CEFTRIAXONE: SIGNIFICANT CHANGE UP
-  CEFUROXIME: SIGNIFICANT CHANGE UP
-  CIPROFLOXACIN: SIGNIFICANT CHANGE UP
-  ERTAPENEM: SIGNIFICANT CHANGE UP
-  GENTAMICIN: SIGNIFICANT CHANGE UP
-  IMIPENEM: SIGNIFICANT CHANGE UP
-  LEVOFLOXACIN: SIGNIFICANT CHANGE UP
-  MEROPENEM: SIGNIFICANT CHANGE UP
-  NITROFURANTOIN: SIGNIFICANT CHANGE UP
-  PIPERACILLIN/TAZOBACTAM: SIGNIFICANT CHANGE UP
-  TOBRAMYCIN: SIGNIFICANT CHANGE UP
-  TRIMETHOPRIM/SULFAMETHOXAZOLE: SIGNIFICANT CHANGE UP
ALBUMIN SERPL ELPH-MCNC: 2.7 G/DL — LOW (ref 3.5–5)
ALP SERPL-CCNC: 89 U/L — SIGNIFICANT CHANGE UP (ref 40–120)
ALT FLD-CCNC: 23 U/L DA — SIGNIFICANT CHANGE UP (ref 10–60)
ANION GAP SERPL CALC-SCNC: 7 MMOL/L — SIGNIFICANT CHANGE UP (ref 5–17)
AST SERPL-CCNC: 18 U/L — SIGNIFICANT CHANGE UP (ref 10–40)
BILIRUB SERPL-MCNC: 0.4 MG/DL — SIGNIFICANT CHANGE UP (ref 0.2–1.2)
BUN SERPL-MCNC: 34 MG/DL — HIGH (ref 7–18)
CALCIUM SERPL-MCNC: 9.3 MG/DL — SIGNIFICANT CHANGE UP (ref 8.4–10.5)
CHLORIDE SERPL-SCNC: 109 MMOL/L — HIGH (ref 96–108)
CO2 SERPL-SCNC: 22 MMOL/L — SIGNIFICANT CHANGE UP (ref 22–31)
CREAT SERPL-MCNC: 1.66 MG/DL — HIGH (ref 0.5–1.3)
CULTURE RESULTS: ABNORMAL
EGFR: 44 ML/MIN/1.73M2 — LOW
EGFR: 44 ML/MIN/1.73M2 — LOW
GLUCOSE SERPL-MCNC: 107 MG/DL — HIGH (ref 70–99)
HCT VFR BLD CALC: 37.1 % — LOW (ref 39–50)
HGB BLD-MCNC: 12.3 G/DL — LOW (ref 13–17)
MAGNESIUM SERPL-MCNC: 1.8 MG/DL — SIGNIFICANT CHANGE UP (ref 1.6–2.6)
MCHC RBC-ENTMCNC: 28.4 PG — SIGNIFICANT CHANGE UP (ref 27–34)
MCHC RBC-ENTMCNC: 33.2 G/DL — SIGNIFICANT CHANGE UP (ref 32–36)
MCV RBC AUTO: 85.7 FL — SIGNIFICANT CHANGE UP (ref 80–100)
METHOD TYPE: SIGNIFICANT CHANGE UP
NRBC BLD AUTO-RTO: 0 /100 WBCS — SIGNIFICANT CHANGE UP (ref 0–0)
ORGANISM # SPEC MICROSCOPIC CNT: ABNORMAL
ORGANISM # SPEC MICROSCOPIC CNT: ABNORMAL
PHOSPHATE SERPL-MCNC: 2.2 MG/DL — LOW (ref 2.5–4.5)
PLATELET # BLD AUTO: 211 K/UL — SIGNIFICANT CHANGE UP (ref 150–400)
POTASSIUM SERPL-MCNC: 3.7 MMOL/L — SIGNIFICANT CHANGE UP (ref 3.5–5.3)
POTASSIUM SERPL-SCNC: 3.7 MMOL/L — SIGNIFICANT CHANGE UP (ref 3.5–5.3)
PROT SERPL-MCNC: 6.6 G/DL — SIGNIFICANT CHANGE UP (ref 6–8.3)
RBC # BLD: 4.33 M/UL — SIGNIFICANT CHANGE UP (ref 4.2–5.8)
RBC # FLD: 13.4 % — SIGNIFICANT CHANGE UP (ref 10.3–14.5)
SODIUM SERPL-SCNC: 138 MMOL/L — SIGNIFICANT CHANGE UP (ref 135–145)
SPECIMEN SOURCE: SIGNIFICANT CHANGE UP
WBC # BLD: 6.6 K/UL — SIGNIFICANT CHANGE UP (ref 3.8–10.5)
WBC # FLD AUTO: 6.6 K/UL — SIGNIFICANT CHANGE UP (ref 3.8–10.5)

## 2025-03-31 PROCEDURE — G0545: CPT

## 2025-03-31 PROCEDURE — 99222 1ST HOSP IP/OBS MODERATE 55: CPT

## 2025-03-31 PROCEDURE — 99232 SBSQ HOSP IP/OBS MODERATE 35: CPT

## 2025-03-31 RX ORDER — CEFTRIAXONE 500 MG/1
1000 INJECTION, POWDER, FOR SOLUTION INTRAMUSCULAR; INTRAVENOUS EVERY 24 HOURS
Refills: 0 | Status: DISCONTINUED | OUTPATIENT
Start: 2025-03-31 | End: 2025-03-31

## 2025-03-31 RX ORDER — CEFTRIAXONE 500 MG/1
1000 INJECTION, POWDER, FOR SOLUTION INTRAMUSCULAR; INTRAVENOUS EVERY 24 HOURS
Refills: 0 | Status: DISCONTINUED | OUTPATIENT
Start: 2025-03-31 | End: 2025-04-01

## 2025-03-31 RX ORDER — POTASSIUM PHOSPHATE, MONOBASIC POTASSIUM PHOSPHATE, DIBASIC INJECTION, 236; 224 MG/ML; MG/ML
30 SOLUTION, CONCENTRATE INTRAVENOUS ONCE
Refills: 0 | Status: COMPLETED | OUTPATIENT
Start: 2025-03-31 | End: 2025-03-31

## 2025-03-31 RX ADMIN — MYCOPHENOLATE MOFETIL 500 MILLIGRAM(S): 500 TABLET, FILM COATED ORAL at 17:39

## 2025-03-31 RX ADMIN — ATORVASTATIN CALCIUM 40 MILLIGRAM(S): 80 TABLET, FILM COATED ORAL at 21:55

## 2025-03-31 RX ADMIN — MYCOPHENOLATE MOFETIL 500 MILLIGRAM(S): 500 TABLET, FILM COATED ORAL at 05:40

## 2025-03-31 RX ADMIN — TAMSULOSIN HYDROCHLORIDE 0.4 MILLIGRAM(S): 0.4 CAPSULE ORAL at 21:55

## 2025-03-31 RX ADMIN — HEPARIN SODIUM 5000 UNIT(S): 1000 INJECTION INTRAVENOUS; SUBCUTANEOUS at 17:39

## 2025-03-31 RX ADMIN — TACROLIMUS 2 MILLIGRAM(S): 0.5 CAPSULE ORAL at 09:10

## 2025-03-31 RX ADMIN — CEFTRIAXONE 100 MILLIGRAM(S): 500 INJECTION, POWDER, FOR SOLUTION INTRAMUSCULAR; INTRAVENOUS at 17:40

## 2025-03-31 RX ADMIN — CEFEPIME 100 MILLIGRAM(S): 2 INJECTION, POWDER, FOR SOLUTION INTRAVENOUS at 05:42

## 2025-03-31 RX ADMIN — ENTECAVIR 0.5 MILLIGRAM(S): 0.5 TABLET ORAL at 11:28

## 2025-03-31 RX ADMIN — HEPARIN SODIUM 5000 UNIT(S): 1000 INJECTION INTRAVENOUS; SUBCUTANEOUS at 05:40

## 2025-03-31 RX ADMIN — Medication 81 MILLIGRAM(S): at 11:28

## 2025-03-31 RX ADMIN — PREDNISONE 5 MILLIGRAM(S): 20 TABLET ORAL at 05:39

## 2025-03-31 RX ADMIN — POTASSIUM PHOSPHATE, MONOBASIC POTASSIUM PHOSPHATE, DIBASIC INJECTION, 83.33 MILLIMOLE(S): 236; 224 SOLUTION, CONCENTRATE INTRAVENOUS at 09:11

## 2025-03-31 NOTE — PROGRESS NOTE ADULT - PROBLEM SELECTOR PLAN 1
p/w fever, HR >90, WBC>12, RR>20, lactate  -UA: WBC TNTC, RBC 16, large leuk esterase, negative nitrite, bacteria TNTC  -UCx from 2/13/25 grew pan sensitive E. coli s/p course of PO cefuroxime   -CXR:R base atelectasis vs. infiltrate (wet read) -- f/u official report   -s/p ceftriaxone in ED and  1L LR as bolus in ED  -c/w maintenance IVF  -c/w cefepime 2g q24 (renally dosed)  -f/u BCx, UCx  -f/u renal US in AM  -ID consult in AM
p/w fever, HR >90, WBC>12, RR>20, lactate  -UA: WBC TNTC, RBC 16, large leuk esterase, negative nitrite, bacteria TNTC  -UCx from 2/13/25 grew pan sensitive E. coli s/p course of PO cefuroxime   -CXR: Bilateral lower zone linear subsegmental atelectasis.  -s/p ceftriaxone in ED and  1L LR as bolus in ED  -S/p maintenance IVF  -c/w cefepime 2g q24 (renally dosed)  -BCx: NGTD,   -f/u renal US   -ID consulted Dr. Gómez
p/w fever, HR >90, WBC>12, RR>20, lactate  -UA: WBC TNTC, RBC 16, large leuk esterase, negative nitrite, bacteria TNTC  -UCx from 2/13/25 grew pan sensitive E. coli s/p course of PO cefuroxime   -CXR:R base atelectasis vs. infiltrate (wet read) -- f/u official report   -s/p ceftriaxone in ED and  1L LR as bolus in ED  -c/w maintenance IVF  -c/w cefepime 2g q24 (renally dosed)  -f/u BCx, UCx  -f/u renal US in AM  -ID consult in AM

## 2025-03-31 NOTE — PROGRESS NOTE ADULT - ATTENDING COMMENTS
70 year old man with medical hx including CAD, HTN, BPH, IgA nephropathy, ESRD s/p renal  transplant who comes in with fever and urinary symptoms with + UA concerning for UTI. OF note patient had UTI with pansensitive E. coli that was treated.  Would image the kidney with CT with contrast but the risk of DONTRELL vs benefit is high.    Alert, cooperative man in NAD  Vital Signs Last 24 Hrs  T(C): 36.9 (31 Mar 2025 13:13), Max: 37.3 (31 Mar 2025 04:51)  T(F): 98.5 (31 Mar 2025 13:13), Max: 99.1 (31 Mar 2025 04:51)  HR: 85 (31 Mar 2025 13:13) (75 - 96)  BP: 139/76 (31 Mar 2025 13:13) (120/62 - 139/76)  BP(mean): 97 (31 Mar 2025 13:13) (97 - 97)  RR: 18 (31 Mar 2025 13:13) (17 - 18)  SpO2: 95% (31 Mar 2025 13:13) (93% - 95%)    Parameters below as of 31 Mar 2025 13:13  Patient On (Oxygen Delivery Method): room air    Lungs, clear  Cor, RRR  Abdomen, soft, non-tender  Neurological, intact    White Blood Cell - Urine: 20 /HPF (03.29.25 @ 13:20)   >100,000 CFU/ml Escherichia coli  susceptible    IMP: Recurrent, complicated UTI in a post-transplant patient         r/o esequiel-nephric abscess  Plan:            renal ultrasound has been performed, but not yet read.   ID consultation, Dr. Gómez  Neprhology consultation, seen and appreciated. 70 year old man with medical hx including CAD, HTN, BPH, IgA nephropathy, ESRD s/p renal  transplant who comes in with fever and urinary symptoms with + UA concerning for UTI. OF note patient had UTI with pansensitive E. coli that was treated.  Would image the kidney with CT with contrast but the risk of DONTRELL vs benefit is high.    Alert, cooperative man in NAD  Vital Signs Last 24 Hrs  T(C): 36.9 (31 Mar 2025 13:13), Max: 37.3 (31 Mar 2025 04:51)  T(F): 98.5 (31 Mar 2025 13:13), Max: 99.1 (31 Mar 2025 04:51)  HR: 85 (31 Mar 2025 13:13) (75 - 96)  BP: 139/76 (31 Mar 2025 13:13) (120/62 - 139/76)  BP(mean): 97 (31 Mar 2025 13:13) (97 - 97)  RR: 18 (31 Mar 2025 13:13) (17 - 18)  SpO2: 95% (31 Mar 2025 13:13) (93% - 95%)    Parameters below as of 31 Mar 2025 13:13  Patient On (Oxygen Delivery Method): room air    Lungs, clear  Cor, RRR  Abdomen, soft, non-tender  Neurological, intact    White Blood Cell - Urine: 20 /HPF (03.29.25 @ 13:20)   >100,000 CFU/ml Escherichia coli  susceptible    IMP: Recurrent, complicated UTI in a post-transplant patient         r/o esequiel-nephric abscess        SAVAGE/CKD 2  Plan:            renal ultrasound has been performed, but not yet read.   ID consultation, Dr. Gómez  Neprhology consultation, seen and appreciated.

## 2025-03-31 NOTE — PROGRESS NOTE ADULT - PROBLEM SELECTOR PLAN 4
hx of CAD with stents  -c/w home aspirin and atorvastatin  -R base crackles on exam  -CXR shows R base atelectasis vs. infiltrate (wet read) -- f/u official report   -last TTE (6/2019): EF 50-55%, Grade II diastolic dysfunction
hx of CAD with stents  -c/w home aspirin and atorvastatin  -R base crackles on exam  -CXR shows R base atelectasis vs. infiltrate (wet read) -- f/u official report   -last TTE (6/2019): EF 50-55%, Grade II diastolic dysfunction
p/w Creatinine: 2.10  -baseline sCr s/p kidney transplant-1.67 (9/2023), 1.91-2.22 (2/2025)   -f/u urine lytes, calculate FENa  -avoid nephrotoxic agents  -S/P IVF for now > Scr today 1.66  -trend BMP daily  -c/w renal diet per patient request  -Nephrology Dr. Saleem

## 2025-03-31 NOTE — PROGRESS NOTE ADULT - PROBLEM SELECTOR PLAN 3
p/w Creatinine: 2.10  -baseline sCr s/p kidney transplant-1.67 (9/2023), 1.91-2.22 (2/2025)   -f/u urine lytes, calculate FENa  -avoid nephrotoxic agents  -c/w IVF for now  -trend BMP daily  -c/w renal diet per patient request  -Nephrology consult in AM
p/w Creatinine: 2.10  -baseline sCr s/p kidney transplant-1.67 (9/2023), 1.91-2.22 (2/2025)   -f/u urine lytes, calculate FENa  -avoid nephrotoxic agents  -c/w IVF for now  -trend BMP daily  -c/w renal diet per patient request  -Nephrology consult in AM
hx of ESRD on HD 2/2 IgA nephropathy, s/p renal transplant 4 years ago  -outpatient nephrologist Dr. Esme Adams  -c/w home immunosuppressive therapy: prednisone 5mg qd, tacrolimus XR 1mg qd, Cellcept 500mg BID  -c/w entecavir qd for HBV ppx  -f/u renal US

## 2025-03-31 NOTE — PROGRESS NOTE ADULT - PROBLEM SELECTOR PLAN 8
hx of BPH on tamsulosin  -c/w home meds  -monitor urine output  -bladder scan PRN
DVT: heparin q12
DVT: heparin q12

## 2025-03-31 NOTE — PROGRESS NOTE ADULT - SUBJECTIVE AND OBJECTIVE BOX
BENJAMINPRCIILASTERLINGREYNA  70y  Patient is a 70y old  Male who presents with a chief complaint of sepsis 2/2 uti (recurrent) (31 Mar 2025 10:55)    HPI:    Seen and examined. Followed for Transplant management.  DDRT 4 years ago.     HEALTH ISSUES - PROBLEM Dx:  Sepsis due to urinary tract infection    Renal transplant, status post    Renal insufficiency    HTN (hypertension)    HLD (hyperlipidemia)    BPH (benign prostatic hyperplasia)    Prophylactic measure    CAD (coronary artery disease)    E. coli UTI (urinary tract infection)    Discharge planning issues          MEDICATIONS  (STANDING):  aspirin  chewable 81 milliGRAM(s) Oral daily  atorvastatin 40 milliGRAM(s) Oral at bedtime  cefepime   IVPB 2000 milliGRAM(s) IV Intermittent every 24 hours  entecavir 0.5 milliGRAM(s) Oral daily  heparin   Injectable 5000 Unit(s) SubCutaneous every 12 hours  influenza  Vaccine (HIGH DOSE) 0.5 milliLiter(s) IntraMuscular once  mycophenolate mofetil 500 milliGRAM(s) Oral two times a day  predniSONE   Tablet 5 milliGRAM(s) Oral daily  tacrolimus ER Tablet (ENVARSUS XR) 2 milliGRAM(s) Oral daily  tamsulosin 0.4 milliGRAM(s) Oral at bedtime    MEDICATIONS  (PRN):  acetaminophen     Tablet .. 650 milliGRAM(s) Oral every 6 hours PRN Temp greater or equal to 38C (100.4F), Mild Pain (1 - 3)    Vital Signs Last 24 Hrs  T(C): 37.3 (31 Mar 2025 04:51), Max: 37.3 (31 Mar 2025 04:51)  T(F): 99.1 (31 Mar 2025 04:51), Max: 99.1 (31 Mar 2025 04:51)  HR: 96 (31 Mar 2025 04:51) (75 - 96)  BP: 120/62 (31 Mar 2025 04:51) (105/62 - 138/75)  BP(mean): --  RR: 17 (31 Mar 2025 04:51) (17 - 18)  SpO2: 93% (31 Mar 2025 04:51) (93% - 95%)    Parameters below as of 31 Mar 2025 04:51  Patient On (Oxygen Delivery Method): room air      Daily     Daily     PHYSICAL EXAM:  Constitutional: He appears comfortable and not distressed. Not diaphoretic.    Neck:  The thyroid is normal. Trachea is midline.     Breasts: Normal examination.    Respiratory: The lungs are clear to auscultation. No dullness and expansion is normal.    Cardiovascular: S1 and S2 are normal. No murmurs rubs or gallops are present.    Gastrointestinal: The abdomen is soft. No tenderness is present. No masses are present. Bowel sounds are normal.    Genitourinary: The bladder is not distended. No CVA tenderness is present.    Extremities: No edema is noted. No deformities are present.    Neurological: Cognition is normal. Tone, power and sensation are normal. Gait is steady.    Skin: No lesions are seen  or palpated.    Lymph Nodes: No lymphadenopathy is present.    Psychiatric: Mood is appropriate. No hallucinations or flight of ideas are noted.                              12.3   6.60  )-----------( 211      ( 31 Mar 2025 05:27 )             37.1     03-31    138  |  109[H]  |  34[H]  ----------------------------<  107[H]  3.7   |  22  |  1.66[H]    Ca    9.3      31 Mar 2025 05:27  Phos  2.2     03-31  Mg     1.8     03-31    TPro  6.6  /  Alb  2.7[L]  /  TBili  0.4  /  DBili  x   /  AST  18  /  ALT  23  /  AlkPhos  89  03-31

## 2025-03-31 NOTE — PROGRESS NOTE ADULT - PROBLEM SELECTOR PLAN 7
h/o HLD on atorvastatin  -c/w home med  -lipid profile > Chol 161, Trigl 194, HDL 38  -DASH diet
hx of BPH on tamsulosin  -c/w home meds  -monitor urine output  -bladder scan PRN
hx of BPH on tamsulosin  -c/w home meds  -monitor urine output  -bladder scan PRN

## 2025-03-31 NOTE — PROGRESS NOTE ADULT - ASSESSMENT
70y M with PMH of HTN, HLD, CAD w/ stents, MTHFR mutation, Shingles, IgA nephropathy leading to ESRD previously on HD, now s/p renal transplant in 2021 presenting with fever, chills, and dysuria. Admitted to medicine for sepsis 2/2 UTI. Urine Cx grew E.Coli. ID Dr. Gómez consulted.

## 2025-03-31 NOTE — PROGRESS NOTE ADULT - ASSESSMENT
Recurrent UTIs:  He has 3 episodes of UTIs in the last 2 months.  He has E Coli in the urine now but had E Coli in February also.  - Continue Cefepime.  - follow up urine and blood cultures.  - Hydrate.  - Imaging renal system, ie CT CT/P without contrast or SONO to rule out structural abnormalities.  - Hold Mycophenolate if fevers continue.    Renal Transplant:  Cr is 1.5 baseline.  Tacrolimus levels are high, but it was not a Trough level.  - Continue Envarsus at current dose.  - If fevers or chills recur, hold Mycophenolate.  - tacrolimus trough level in am.

## 2025-03-31 NOTE — PHARMACOTHERAPY INTERVENTION NOTE - COMMENTS
Currently, on cefepime for treatment of urinary tract infection.    Suggest to dose with ceftriaxone 1 g every 24 hours.

## 2025-03-31 NOTE — CONSULT NOTE ADULT - ASSESSMENT
Recurrent UTIs:  He has 3 episodes of UTIs in the last 2 months.  He has E Coli in the urine now but had E Coli in February also.  - Continue Cefepime.  - follow up urine and blood cultures.  - Hydrate.  - Imaging renal system, ie CT CT/P without contrast or SONO to rule out structural abnormalities.  - Hold Mycophenolate if fevers continue.    Renal Transplant:  Cr is 1.5 baseline.  Tacrolimus levels are high, unclear if Trough level.  - Continue Envarsus.  - If fevers or chills recur, hold Mycophenolate.  - tacrolimus trough level in am.  
70y M with PMH of HTN, HLD, CAD w/ stents, MTHFR mutation, Shingles, IgA nephropathy leading to ESRD previously on HD, now s/p renal transplant in 2021 presenting with fever, chills, and dysuria. Had been seen in our ER 2/13 for similar complaints and discharged on cefuroxime. Pt feeling much better. U/A + with urine culture growing broadly susceptible E. coli. BCs neg. Pt. had abd/pelvis U/S today with results pending. Abs was changed today with ceftriaxone substituting for cefepime.     #UTI (see above): did not meet sepsis criteria-- pt's urine Cx growing E. coli  --awaiting U/S results  --cont. CTX for now    #S/P renal Tx  --cont. immunosuppressive meds     #CAD    Chart reviewed, including notes/labs/Cx; pt examined at the bedside; discussed the Dx/management of UTI with the pt, his son, and Dr. Moe; provided coordination of care re ID issues with Dr. Moe

## 2025-03-31 NOTE — PROGRESS NOTE ADULT - SUBJECTIVE AND OBJECTIVE BOX
NP Note discussed with  primary attending    Patient is a 70y old  Male who presents with a chief complaint of sepsis 2/2 uti (recurrent) (31 Mar 2025 10:55)      INTERVAL HPI/OVERNIGHT EVENTS: no new complaints    MEDICATIONS  (STANDING):  aspirin  chewable 81 milliGRAM(s) Oral daily  atorvastatin 40 milliGRAM(s) Oral at bedtime  cefepime   IVPB 2000 milliGRAM(s) IV Intermittent every 24 hours  entecavir 0.5 milliGRAM(s) Oral daily  heparin   Injectable 5000 Unit(s) SubCutaneous every 12 hours  influenza  Vaccine (HIGH DOSE) 0.5 milliLiter(s) IntraMuscular once  mycophenolate mofetil 500 milliGRAM(s) Oral two times a day  predniSONE   Tablet 5 milliGRAM(s) Oral daily  tacrolimus ER Tablet (ENVARSUS XR) 2 milliGRAM(s) Oral daily  tamsulosin 0.4 milliGRAM(s) Oral at bedtime    MEDICATIONS  (PRN):  acetaminophen     Tablet .. 650 milliGRAM(s) Oral every 6 hours PRN Temp greater or equal to 38C (100.4F), Mild Pain (1 - 3)      __________________________________________________  REVIEW OF SYSTEMS:    CONSTITUTIONAL: No fever,   EYES: no acute visual disturbances  NECK: No pain or stiffness  RESPIRATORY: No cough; No shortness of breath  CARDIOVASCULAR: No chest pain, no palpitations  GASTROINTESTINAL: No pain. No nausea or vomiting; No diarrhea   NEUROLOGICAL: No headache or numbness, no tremors  MUSCULOSKELETAL: No joint pain, no muscle pain  GENITOURINARY: no dysuria, no frequency, no hesitancy  PSYCHIATRY: no depression , no anxiety  ALL OTHER  ROS negative        Vital Signs Last 24 Hrs  T(C): 37.3 (31 Mar 2025 04:51), Max: 37.3 (31 Mar 2025 04:51)  T(F): 99.1 (31 Mar 2025 04:51), Max: 99.1 (31 Mar 2025 04:51)  HR: 96 (31 Mar 2025 04:51) (75 - 96)  BP: 120/62 (31 Mar 2025 04:51) (105/62 - 138/75)  BP(mean): --  RR: 17 (31 Mar 2025 04:51) (17 - 18)  SpO2: 93% (31 Mar 2025 04:51) (93% - 95%)    Parameters below as of 31 Mar 2025 04:51  Patient On (Oxygen Delivery Method): room air        ________________________________________________  PHYSICAL EXAM:  GENERAL: NAD  HEENT: Normocephalic;  conjunctivae and sclerae clear; moist mucous membranes;   NECK : supple  CHEST/LUNG: Clear to auscultation bilaterally with good air entry   HEART: S1 S2  regular; no murmurs, gallops or rubs  ABDOMEN: Soft, Nontender, Nondistended; Bowel sounds present  EXTREMITIES: no cyanosis; no edema; no calf tenderness  SKIN: warm and dry; no rash  NERVOUS SYSTEM:  Awake and alert; Oriented  to place, person and time ; no new deficits    _________________________________________________  LABS:                        12.3   6.60  )-----------( 211      ( 31 Mar 2025 05:27 )             37.1     03-31    138  |  109[H]  |  34[H]  ----------------------------<  107[H]  3.7   |  22  |  1.66[H]    Ca    9.3      31 Mar 2025 05:27  Phos  2.2     03-31  Mg     1.8     03-31    TPro  6.6  /  Alb  2.7[L]  /  TBili  0.4  /  DBili  x   /  AST  18  /  ALT  23  /  AlkPhos  89  03-31      Urinalysis Basic - ( 31 Mar 2025 05:27 )    Color: x / Appearance: x / SG: x / pH: x  Gluc: 107 mg/dL / Ketone: x  / Bili: x / Urobili: x   Blood: x / Protein: x / Nitrite: x   Leuk Esterase: x / RBC: x / WBC x   Sq Epi: x / Non Sq Epi: x / Bacteria: x      CAPILLARY BLOOD GLUCOSE            RADIOLOGY & ADDITIONAL TESTS:  < from: Xray Chest 1 View- PORTABLE-Urgent (Xray Chest 1 View- PORTABLE-Urgent .) (03.28.25 @ 23:12) >  IMPRESSION:  Cardiomegaly.  Bilateral lower zone linear subsegmental atelectasis.    < end of copied text >    Imaging Personally Reviewed:  YES    Consultant(s) Notes Reviewed:   YES    Care Discussed with Consultants :     Plan of care was discussed with patient and /or primary care giver; all questions and concerns were addressed and care was aligned with patient's wishes.

## 2025-03-31 NOTE — CONSULT NOTE ADULT - SUBJECTIVE AND OBJECTIVE BOX
HPI:  70y M with PMH of HTN, HLD, CAD w/ stents, MTHFR mutation, Shingles, IgA nephropathy leading to ESRD previously on HD, now s/p renal transplant in  presenting with fever, chills, and dysuria. Per son providing collateral and [place]ian translation at bedside, UTIs and current symptoms have been a recurring issue since early February. At that time patient came to ED for symptoms but deferred admission opting for discharge home with oral antibiotics. Patient reports experiencing UTI symptoms every other week, including fever, right flank pain, urinary frequency and dysuria. Current episode notable for more severe symptoms, including shaking chills interfering with daily activities and significant weakness. Describes cyclical pattern: fever onset triggers frequent urination and severe chills. Takes Tylenol for relief, but fever returns once medication wears off. Reports pain during urination, especially when febrile, and sensation of incomplete bladder emptying during febrile episodes. Patient notes some nausea but denies vomiting, shortness of breath, cough, chest pain, or palpitations. Reports significant weight loss and decreased appetite recently. Patient still adheres to renal-restricted diet and denies alcohol consumption, smoking, or illicit drug use. Can walk independently but has limitations with long distances. Recent antibiotic treatments for recurring UTIs have not fully resolved the issue. Review of systems positive for fever, chills, weakness, nausea, frequent urination, dysuria, and sensation of incomplete bladder emptying. Negative for rash, chest pain, palpitations, shortness of breath, cough, vomiting, abdominal pain, diarrhea, constipation, dizziness, and lightheadedness.  In the ED: T(F): , Max: 99 (18:38); HR:  (84 - 94); BP:  (98/52 - 108/52); RR:  (14 - 17); SpO2:  (94% - 99%); WBC:18.65, Hb.1, PLT:198; Na:137, K:4.1, Cl:110, HCO3:21, BUN:45, sCr:2.10, Lactate: 0.8; AST:17, ALT:20, ALP:70, Tbili:0.8. Adm for sepsis due to urinary tract source and started on cefepime. ID asked to evaluate pt with h/o renal Tx and UTI.       PAST MEDICAL & SURGICAL HISTORY:  HTN (hypertension)      High cholesterol      IgA nephropathy      ESRD (end stage renal disease) on dialysis      MTHFR mutation      BPH (benign prostatic hyperplasia)      History of renal transplant      S/P coronary artery stent placement          MEDS:  acetaminophen     Tablet .. 650 milliGRAM(s) Oral every 6 hours PRN  aspirin  chewable 81 milliGRAM(s) Oral daily  atorvastatin 40 milliGRAM(s) Oral at bedtime  cefepime   IVPB 2000 milliGRAM(s) IV Intermittent every 24 hours  entecavir 0.5 milliGRAM(s) Oral daily  heparin   Injectable 5000 Unit(s) SubCutaneous every 12 hours  influenza  Vaccine (HIGH DOSE) 0.5 milliLiter(s) IntraMuscular once  mycophenolate mofetil 500 milliGRAM(s) Oral two times a day  predniSONE   Tablet 5 milliGRAM(s) Oral daily  tacrolimus ER Tablet (ENVARSUS XR) 2 milliGRAM(s) Oral daily  tamsulosin 0.4 milliGRAM(s) Oral at bedtime      ALLERGIES  No Known Allergies      SOCIAL HISTORY:    FAMILY HISTORY:      ROS:    General:	    Skin:  	  HEENT:    Respiratory and Thorax:  	  Cardiovascular:	    Abdomen:	    Genitourinary:	    Musculoskeletal:	    Neurological:	    Psychiatric:	    Hematology/Lymphatics:	    Endocrine:	    PHYSICAL EXAM:    Vital Signs Last 24 Hrs  T(C): 37.3 (31 Mar 2025 04:51), Max: 37.3 (31 Mar 2025 04:51)  T(F): 99.1 (31 Mar 2025 04:51), Max: 99.1 (31 Mar 2025 04:51)  HR: 96 (31 Mar 2025 04:51) (75 - 96)  BP: 120/62 (31 Mar 2025 04:51) (105/62 - 138/75)  BP(mean): --  RR: 17 (31 Mar 2025 04:51) (17 - 18)  SpO2: 93% (31 Mar 2025 04:51) (93% - 95%)    Parameters below as of 31 Mar 2025 04:51  Patient On (Oxygen Delivery Method): room air          Gen:    HEENT:    Neck:    Lymph Nodes:    Breasts:    Back:    Chest/Thorax:    Cardiovascular:    Gastrointestinal:    Genitourinary:    Rectal:    Extremities:    Vascular:    Neurological:    Skin:    Psychiatric:    LABS/DIAGNOSTIC TESTS:                          12.3   6.60  )-----------( 211      ( 31 Mar 2025 05:27 )             37.1     WBC Count: 6.60 K/uL ( @ 05:27)  WBC Count: 9.44 K/uL ( @ 07:55)  WBC Count: 7.81 K/uL ( @ 06:36)  WBC Count: 18.65 K/uL ( @ 19:10)          138  |  109[H]  |  34[H]  ----------------------------<  107[H]  3.7   |  22  |  1.66[H]    Ca    9.3      31 Mar 2025 05:27  Phos  2.2       Mg     1.8         TPro  6.6  /  Alb  2.7[L]  /  TBili  0.4  /  DBili  x   /  AST  18  /  ALT  23  /  AlkPhos  89        Urine Microscopic-Add On (NC) (25 @ 13:20)   White Blood Cell - Urine: 20 /HPF  Red Blood Cell - Urine: 3 /HPF  Bacteria: Few /HPF  Squamous Epithelial Cells: Present  Comment - Urine: Few Platelets Clump seen          LIVER FUNCTIONS - ( 31 Mar 2025 05:27 )  Alb: 2.7 g/dL / Pro: 6.6 g/dL / ALK PHOS: 89 U/L / ALT: 23 U/L DA / AST: 18 U/L / GGT: x             LACTATE: Lactate, Blood (25 @ 19:10)   Lactate, Blood: 0.8 mmol/L        CULTURES:     Culture - Blood (collected 25 @ 19:10)  Source: Blood Blood-Peripheral  Preliminary Report (25 @ 02:02):    No growth at 48 Hours    Culture - Urine (collected 25 @ 19:10)  Source: Clean Catch Clean Catch (Midstream)  Preliminary Report (25 @ 11:30):    >100,000 CFU/ml Escherichia coli    Culture - Blood (collected 25 @ 19:00)  Source: Blood Blood-Peripheral  Preliminary Report (25 @ 02:02):    No growth at 48 Hours        RADIOLOGY  < from: Xray Chest 1 View- PORTABLE-Urgent (Xray Chest 1 View- PORTABLE-Urgent .) (25 @ 23:12) >    ACC: [id number] EXAM:  XR CHEST PORTABLE URGENT 1V   ORDERED BY: ARIEL HAND     PROCEDURE DATE:  2025          INTERPRETATION:  Portable AP chest radiograph    COMPARISON: 2/10/2025 chest x-ray.    CLINICAL INFORMATION: Sepsis.    FINDINGS:  CATHETERS AND TUBES: None    PULMONARY:  Bilateral lower zone  linear subsegmental atelectasis. Upper zones   clear...  No pleural effusion or pneumothorax.    HEART/VASCULAR: The  heart is enlarged in transverse diameter. .    BONES: The visualized osseous thorax is intact.    IMPRESSION:  Cardiomegaly.  Bilateral lower zone linear subsegmental atelectasis.      < end of copied text >                 HPI:  70y M with PMH of HTN, HLD, CAD w/ stents, MTHFR mutation, Shingles, IgA nephropathy leading to ESRD previously on HD, now s/p renal transplant in  presenting with fever, chills, and dysuria. Per son providing collateral and [place]anese translation at bedside, UTIs and current symptoms have been a recurring issue since early February. At that time patient came to ED for symptoms but deferred admission opting for discharge home with oral antibiotics. Patient reports experiencing UTI symptoms every other week, including fever, right flank pain, urinary frequency and dysuria. Current episode notable for more severe symptoms, including shaking chills interfering with daily activities and significant weakness. Describes cyclical pattern: fever onset triggers frequent urination and severe chills. Takes Tylenol for relief, but fever returns once medication wears off. Reports pain during urination, especially when febrile, and sensation of incomplete bladder emptying during febrile episodes. Patient notes some nausea but denies vomiting, shortness of breath, cough, chest pain, or palpitations. Reports significant weight loss and decreased appetite recently. Patient still adheres to renal-restricted diet and denies alcohol consumption, smoking, or illicit drug use. Can walk independently but has limitations with long distances. Recent antibiotic treatments for recurring UTIs have not fully resolved the issue. Review of systems positive for fever, chills, weakness, nausea, frequent urination, dysuria, and sensation of incomplete bladder emptying. Negative for rash, chest pain, palpitations, shortness of breath, cough, vomiting, abdominal pain, diarrhea, constipation, dizziness, and lightheadedness.  In the ED: T(F): , Max: 99 (18:38); HR:  (84 - 94); BP:  (98/52 - 108/52); RR:  (14 - 17); SpO2:  (94% - 99%); WBC:18.65, Hb.1, PLT:198; Na:137, K:4.1, Cl:110, HCO3:21, BUN:45, sCr:2.10, Lactate: 0.8; AST:17, ALT:20, ALP:70, Tbili:0.8. Adm for sepsis due to urinary tract source and started on cefepime. ID asked to evaluate pt with h/o renal Tx and UTI.       PAST MEDICAL & SURGICAL HISTORY:  HTN (hypertension)      High cholesterol      IgA nephropathy      ESRD (end stage renal disease) on dialysis      MTHFR mutation      BPH (benign prostatic hyperplasia)      History of renal transplant      S/P coronary artery stent placement          MEDS:  acetaminophen     Tablet .. 650 milliGRAM(s) Oral every 6 hours PRN  aspirin  chewable 81 milliGRAM(s) Oral daily  atorvastatin 40 milliGRAM(s) Oral at bedtime  cefepime   IVPB 2000 milliGRAM(s) IV Intermittent every 24 hours  entecavir 0.5 milliGRAM(s) Oral daily  heparin   Injectable 5000 Unit(s) SubCutaneous every 12 hours  influenza  Vaccine (HIGH DOSE) 0.5 milliLiter(s) IntraMuscular once  mycophenolate mofetil 500 milliGRAM(s) Oral two times a day  predniSONE   Tablet 5 milliGRAM(s) Oral daily  tacrolimus ER Tablet (ENVARSUS XR) 2 milliGRAM(s) Oral daily  tamsulosin 0.4 milliGRAM(s) Oral at bedtime      ALLERGIES  No Known Allergies      SOCIAL HISTORY:    FAMILY HISTORY:      ROS:    General:	    Skin:  	  HEENT:    Respiratory and Thorax:  	  Cardiovascular:	    Abdomen:	    Genitourinary:	    Musculoskeletal:	    Neurological:	    Psychiatric:	    Hematology/Lymphatics:	    Endocrine:	    PHYSICAL EXAM:    Vital Signs Last 24 Hrs  T(C): 37.3 (31 Mar 2025 04:51), Max: 37.3 (31 Mar 2025 04:51)  T(F): 99.1 (31 Mar 2025 04:51), Max: 99.1 (31 Mar 2025 04:51)  HR: 96 (31 Mar 2025 04:51) (75 - 96)  BP: 120/62 (31 Mar 2025 04:51) (105/62 - 138/75)  BP(mean): --  RR: 17 (31 Mar 2025 04:51) (17 - 18)  SpO2: 93% (31 Mar 2025 04:51) (93% - 95%)    Parameters below as of 31 Mar 2025 04:51  Patient On (Oxygen Delivery Method): room air          Gen:    HEENT:    Neck:    Lymph Nodes:    Breasts:    Back:    Chest/Thorax:    Cardiovascular:    Gastrointestinal:    Genitourinary:    Rectal:    Extremities:    Vascular:    Neurological:    Skin:    Psychiatric:    LABS/DIAGNOSTIC TESTS:                          12.3   6.60  )-----------( 211      ( 31 Mar 2025 05:27 )             37.1     WBC Count: 6.60 K/uL ( @ 05:27)  WBC Count: 9.44 K/uL ( @ 07:55)  WBC Count: 7.81 K/uL ( @ 06:36)  WBC Count: 18.65 K/uL ( @ 19:10)          138  |  109[H]  |  34[H]  ----------------------------<  107[H]  3.7   |  22  |  1.66[H]    Ca    9.3      31 Mar 2025 05:27  Phos  2.2       Mg     1.8         TPro  6.6  /  Alb  2.7[L]  /  TBili  0.4  /  DBili  x   /  AST  18  /  ALT  23  /  AlkPhos  89        Urine Microscopic-Add On (NC) (25 @ 13:20)   White Blood Cell - Urine: 20 /HPF  Red Blood Cell - Urine: 3 /HPF  Bacteria: Few /HPF  Squamous Epithelial Cells: Present  Comment - Urine: Few Platelets Clump seen          LIVER FUNCTIONS - ( 31 Mar 2025 05:27 )  Alb: 2.7 g/dL / Pro: 6.6 g/dL / ALK PHOS: 89 U/L / ALT: 23 U/L DA / AST: 18 U/L / GGT: x             LACTATE: Lactate, Blood (25 @ 19:10)   Lactate, Blood: 0.8 mmol/L        CULTURES:     Culture - Urine (25 @ 19:10)   - Trimethoprim/Sulfamethoxazole: S <=0.5/9.5  - Amoxicillin/Clavulanic Acid: S <=8/4  - Ampicillin: S <=8 These ampicillin results predict results for amoxicillin  - Ampicillin/Sulbactam: S <=4/2  - Aztreonam: S <=4  - Cefazolin: S <=2 For uncomplicated UTI with K. pneumoniae, E. coli, or P. mirablis: BERNICE <=16 is sensitive and BERNICE >=32 is resistant. This also predicts results for oral agents cefaclor, cefdinir, cefpodoxime, cefprozil, cefuroxime axetil, cephalexin and locarbef for uncomplicated UTI. Note that some isolates may be susceptible to these agents while testing resistant to cefazolin.  - Cefepime: S <=2  - Cefoxitin: S <=8  - Ceftriaxone: S <=1  - Cefuroxime: S <=4  - Ciprofloxacin: S <=0.25  - Ertapenem: S <=0.5  - Gentamicin: S <=2  - Imipenem: S <=1  - Levofloxacin: S <=0.5  - Meropenem: S <=1  - Nitrofurantoin: S <=32 Should not be used to treat pyelonephritis  - Piperacillin/Tazobactam: S <=8  - Tobramycin: S <=2  Specimen Source: Clean Catch Clean Catch (Midstream)  Culture Results:   >100,000 CFU/ml Escherichia coli  Organism Identification: Escherichia coli  Organism: Escherichia coli  Method Type: BERNICE    Culture - Blood (collected 25 @ 19:10)  Source: Blood Blood-Peripheral  Preliminary Report (25 @ 02:02):    No growth at 48 Hours    Culture - Urine (collected 25 @ 19:10)  Source: Clean Catch Clean Catch (Midstream)  Preliminary Report (25 @ 11:30):    >100,000 CFU/ml Escherichia coli    Culture - Blood (collected 25 @ 19:00)  Source: Blood Blood-Peripheral  Preliminary Report (25 @ 02:02):    No growth at 48 Hours        RADIOLOGY  < from: Xray Chest 1 View- PORTABLE-Urgent (Xray Chest 1 View- PORTABLE-Urgent .) (25 @ 23:12) >    ACC: [id number] EXAM:  XR CHEST PORTABLE URGENT 1V   ORDERED BY: ARIEL HAND     PROCEDURE DATE:  2025          INTERPRETATION:  Portable AP chest radiograph    COMPARISON: 2/10/2025 chest x-ray.    CLINICAL INFORMATION: Sepsis.    FINDINGS:  CATHETERS AND TUBES: None    PULMONARY:  Bilateral lower zone  linear subsegmental atelectasis. Upper zones   clear...  No pleural effusion or pneumothorax.    HEART/VASCULAR: The  heart is enlarged in transverse diameter. .    BONES: The visualized osseous thorax is intact.    IMPRESSION:  Cardiomegaly.  Bilateral lower zone linear subsegmental atelectasis.      < end of copied text >                 HPI:  70y M with PMH of HTN, HLD, CAD w/ stents, MTHFR mutation, Shingles, IgA nephropathy leading to ESRD previously on HD, now s/p renal transplant in  presenting with fever, chills, and dysuria. Per son providing collateral and [place]yr[place]z translation at bedside, UTIs and current symptoms have been a recurring issue since early February. At that time patient came to ED for symptoms but deferred admission opting for discharge home with oral antibiotics. Patient reports experiencing UTI symptoms every other week, including fever, right flank pain, urinary frequency and dysuria. Current episode notable for more severe symptoms, including shaking chills interfering with daily activities and significant weakness. Describes cyclical pattern: fever onset triggers frequent urination and severe chills. Takes Tylenol for relief, but fever returns once medication wears off. Reports pain during urination, especially when febrile, and sensation of incomplete bladder emptying during febrile episodes. Patient notes some nausea but denies vomiting, shortness of breath, cough, chest pain, or palpitations. Reports significant weight loss and decreased appetite recently. Patient still adheres to renal-restricted diet and denies alcohol consumption, smoking, or illicit drug use. Can walk independently but has limitations with long distances. Recent antibiotic treatment for recurring UTIs have not fully resolved the issue. Review of systems positive for fever, chills, weakness, nausea, frequent urination, dysuria, and sensation of incomplete bladder emptying. Negative for rash, chest pain, palpitations, shortness of breath, cough, vomiting, abdominal pain, diarrhea, constipation, dizziness, and lightheadedness.  In the ED: T(F): , Max: 99 (18:38); HR:  (84 - 94); BP:  (98/52 - 108/52); RR:  (14 - 17); SpO2:  (94% - 99%); WBC:18.65, Hb.1, PLT:198; Na:137, K:4.1, Cl:110, HCO3:21, BUN:45, sCr:2.10, Lactate: 0.8; AST:17, ALT:20, ALP:70, Tbili:0.8. Adm for sepsis due to urinary tract source and started on cefepime. ID asked to evaluate pt with h/o renal Tx and UTI.     At the time of my consult, pt's son by request helped with Kyr[place]z translation. Pt was recently treated and discharged from our ER  with cefuroxime.       PAST MEDICAL & SURGICAL HISTORY:  HTN (hypertension)      High cholesterol      IgA nephropathy      ESRD (end stage renal disease) previously on dialysis      MTHFR mutation      BPH (benign prostatic hyperplasia)      History of renal transplant at [place]      S/P coronary artery stent placement          MEDS:  acetaminophen     Tablet .. 650 milliGRAM(s) Oral every 6 hours PRN  aspirin  chewable 81 milliGRAM(s) Oral daily  atorvastatin 40 milliGRAM(s) Oral at bedtime  cefepime   IVPB 2000 milliGRAM(s) IV Intermittent every 24 hours (D4)  entecavir 0.5 milliGRAM(s) Oral daily  heparin   Injectable 5000 Unit(s) SubCutaneous every 12 hours  influenza  Vaccine (HIGH DOSE) 0.5 milliLiter(s) IntraMuscular once  mycophenolate mofetil 500 milliGRAM(s) Oral two times a day  predniSONE   Tablet 5 milliGRAM(s) Oral daily  tacrolimus ER Tablet (ENVARSUS XR) 2 milliGRAM(s) Oral daily  tamsulosin 0.4 milliGRAM(s) Oral at bedtime      ALLERGIES  No Known Allergies      SOCIAL HISTORY: emigrated from [facility] >[place]yr ago; lives with family; no cigs, no ETOH    FAMILY HISTORY: not relevant to pt's clinical presentation      ROS: see HPI    General:	no current c/o fever     Skin: no complaints  	  HEENT: no complaints    Respiratory and Thorax: no complaints  	  Cardiovascular:	no CP    GI: no complaints	    Genitourinary:	see HPI; no current urinary tract complaints    Musculoskeletal:	 no complaints    Neurological:	no complaints      PHYSICAL EXAM:    Vital Signs Last 24 Hrs  T(C): 37.3 (31 Mar 2025 04:51), Max: 37.3 (31 Mar 2025 04:51)  T(F): 99.1 (31 Mar 2025 04:51), Max: 99.1 (31 Mar 2025 04:51)  HR: 96 (31 Mar 2025 04:51) (75 - 96)  BP: 120/62 (31 Mar 2025 04:51) (105/62 - 138/75)  BP(mean): --  RR: 17 (31 Mar 2025 04:51) (17 - 18)  SpO2: 93% (31 Mar 2025 04:51) (93% - 95%)    Parameters below as of 31 Mar 2025 04:51  Patient On (Oxygen Delivery Method): room air          Gen: alert, responsive and in NAD    HEENT: NC/At; conj. clear; mouth--good oral hygiene    Neck: supple    Lymph Nodes: no enlarged submand, cervical or suprclav LNs    Back: no vert or CVA tenderness    Chest/Thorax: clear to auscultation    Cardiovascular: S1S2 reg with no m, g, r    ABD: L sided lower abd scar; cd not palpate a kidney; soft and non-tender with active BS    Genitourinary: no moreno    Extremities: no LE swelling, redness     Neurological: A+0x3; toes downgoing    Skin: no rashes    Psychiatric: affect appropriate        LABS/DIAGNOSTIC TESTS:                          12.3   6.60  )-----------( 211      ( 31 Mar 2025 05:27 )             37.1     WBC Count: 6.60 K/uL ( @ 05:27)  WBC Count: 9.44 K/uL ( @ 07:55)  WBC Count: 7.81 K/uL ( @ 06:36)  WBC Count: 18.65 K/uL ( @ 19:10)          138  |  109[H]  |  34[H]  ----------------------------<  107[H]  3.7   |  22  |  1.66[H]    Ca    9.3      31 Mar 2025 05:27  Phos  2.2       Mg     1.8         TPro  6.6  /  Alb  2.7[L]  /  TBili  0.4  /  DBili  x   /  AST  18  /  ALT  23  /  AlkPhos  89        Urine Microscopic-Add On (NC) (25 @ 13:20)   White Blood Cell - Urine: 20 /HPF  Red Blood Cell - Urine: 3 /HPF  Bacteria: Few /HPF  Squamous Epithelial Cells: Present  Comment - Urine: Few Platelets Clump seen      LIVER FUNCTIONS - ( 31 Mar 2025 05:27 )  Alb: 2.7 g/dL / Pro: 6.6 g/dL / ALK PHOS: 89 U/L / ALT: 23 U/L DA / AST: 18 U/L / GGT: x             LACTATE: Lactate, Blood (25 @ 19:10)   Lactate, Blood: 0.8 mmol/L        CULTURES:     Culture - Urine (25 @ 19:10)   - Trimethoprim/Sulfamethoxazole: S <=0.5/9.5  - Amoxicillin/Clavulanic Acid: S <=8/4  - Ampicillin: S <=8 These ampicillin results predict results for amoxicillin  - Ampicillin/Sulbactam: S <=4/2  - Aztreonam: S <=4  - Cefazolin: S <=2 For uncomplicated UTI with K. pneumoniae, E. coli, or P. mirablis: BERNICE <=16 is sensitive and BERNICE >=32 is resistant. This also predicts results for oral agents cefaclor, cefdinir, cefpodoxime, cefprozil, cefuroxime axetil, cephalexin and locarbef for uncomplicated UTI. Note that some isolates may be susceptible to these agents while testing resistant to cefazolin.  - Cefepime: S <=2  - Cefoxitin: S <=8  - Ceftriaxone: S <=1  - Cefuroxime: S <=4  - Ciprofloxacin: S <=0.25  - Ertapenem: S <=0.5  - Gentamicin: S <=2  - Imipenem: S <=1  - Levofloxacin: S <=0.5  - Meropenem: S <=1  - Nitrofurantoin: S <=32 Should not be used to treat pyelonephritis  - Piperacillin/Tazobactam: S <=8  - Tobramycin: S <=2  Specimen Source: Clean Catch Clean Catch (Midstream)  Culture Results:   >100,000 CFU/ml Escherichia coli  Organism Identification: Escherichia coli  Organism: Escherichia coli  Method Type: BERNICE    Culture - Blood (collected 25 @ 19:10)  Source: Blood Blood-Peripheral  Preliminary Report (25 @ 02:02):    No growth at 48 Hours    Culture - Urine (collected 25 @ 19:10)  Source: Clean Catch Clean Catch (Midstream)  Preliminary Report (25 @ 11:30):    >100,000 CFU/ml Escherichia coli    Culture - Blood (collected 25 @ 19:00)  Source: Blood Blood-Peripheral  Preliminary Report (25 @ 02:02):    No growth at 48 Hours        RADIOLOGY  < from: Xray Chest 1 View- PORTABLE-Urgent (Xray Chest 1 View- PORTABLE-Urgent .) (25 @ 23:12) >    ACC: [id number] EXAM:  XR CHEST PORTABLE URGENT 1V   ORDERED BY: ARIEL HAND     PROCEDURE DATE:  2025          INTERPRETATION:  Portable AP chest radiograph    COMPARISON: 2/10/2025 chest x-ray.    CLINICAL INFORMATION: Sepsis.    FINDINGS:  CATHETERS AND TUBES: None    PULMONARY:  Bilateral lower zone  linear subsegmental atelectasis. Upper zones   clear...  No pleural effusion or pneumothorax.    HEART/VASCULAR: The  heart is enlarged in transverse diameter. .    BONES: The visualized osseous thorax is intact.    IMPRESSION:  Cardiomegaly.  Bilateral lower zone linear subsegmental atelectasis.      < end of copied text >    ____________________________________________________

## 2025-03-31 NOTE — PROGRESS NOTE ADULT - PROBLEM SELECTOR PLAN 5
hx of CAD with stents  -c/w home aspirin and atorvastatin  -last TTE (6/2019): EF 50-55%, Grade II diastolic dysfunction
h/o HTN on amlodipine and labetalol 300mg BID  -BP soft in ED (98/52 - 108/52)  -hold home meds in s/o sepsis  -monitor BP  -adjust antihypertensive meds as appropriate
h/o HTN on amlodipine and labetalol 300mg BID  -BP soft in ED (98/52 - 108/52)  -hold home meds in s/o sepsis  -monitor BP  -adjust antihypertensive meds as appropriate

## 2025-03-31 NOTE — PROGRESS NOTE ADULT - PROBLEM SELECTOR PLAN 2
hx of ESRD on HD 2/2 IgA nephropathy, s/p renal transplant 4 years ago  -outpatient nephrologist Dr. Esme Adams  -c/w home immunosuppressive therapy: prednisone 5mg qd, tacrolimus XR 1mg qd, cellcept 500mg BID  -c/w entecavir qd for HBV ppx  -f/u renal US in AM
-Ucx growing E. Coli  -c/w cefepime 2g q24 (renally dosed)  - ID Dr. Gómez following
hx of ESRD on HD 2/2 IgA nephropathy, s/p renal transplant 4 years ago  -outpatient nephrologist Dr. Esme Adams  -c/w home immunosuppressive therapy: prednisone 5mg qd, tacrolimus XR 1mg qd, cellcept 500mg BID  -c/w entecavir qd for HBV ppx  -f/u renal US in AM

## 2025-03-31 NOTE — PROGRESS NOTE ADULT - PROBLEM SELECTOR PLAN 6
h/o HLD on atorvastatin  -c/w home med  -f/u lipid profile  -calculate ASCVD risk  -DASH diet
h/o HLD on atorvastatin  -c/w home med  -f/u lipid profile  -calculate ASCVD risk  -DASH diet
h/o HTN on amlodipine and labetalol 300mg BID  -BP soft in ED (98/52 - 108/52)  -hold home meds in s/o sepsis  -monitor BP  -adjust antihypertensive meds as appropriate

## 2025-04-01 ENCOUNTER — TRANSCRIPTION ENCOUNTER (OUTPATIENT)
Age: 70
End: 2025-04-01

## 2025-04-01 VITALS
SYSTOLIC BLOOD PRESSURE: 118 MMHG | TEMPERATURE: 98 F | DIASTOLIC BLOOD PRESSURE: 68 MMHG | HEART RATE: 82 BPM | RESPIRATION RATE: 17 BRPM | OXYGEN SATURATION: 95 %

## 2025-04-01 LAB
ANION GAP SERPL CALC-SCNC: 7 MMOL/L — SIGNIFICANT CHANGE UP (ref 5–17)
BUN SERPL-MCNC: 38 MG/DL — HIGH (ref 7–18)
CALCIUM SERPL-MCNC: 9.2 MG/DL — SIGNIFICANT CHANGE UP (ref 8.4–10.5)
CHLORIDE SERPL-SCNC: 109 MMOL/L — HIGH (ref 96–108)
CO2 SERPL-SCNC: 22 MMOL/L — SIGNIFICANT CHANGE UP (ref 22–31)
CREAT SERPL-MCNC: 1.52 MG/DL — HIGH (ref 0.5–1.3)
EGFR: 49 ML/MIN/1.73M2 — LOW
EGFR: 49 ML/MIN/1.73M2 — LOW
GLUCOSE SERPL-MCNC: 116 MG/DL — HIGH (ref 70–99)
HCT VFR BLD CALC: 36.9 % — LOW (ref 39–50)
HGB BLD-MCNC: 12.2 G/DL — LOW (ref 13–17)
MCHC RBC-ENTMCNC: 28.2 PG — SIGNIFICANT CHANGE UP (ref 27–34)
MCHC RBC-ENTMCNC: 33.1 G/DL — SIGNIFICANT CHANGE UP (ref 32–36)
MCV RBC AUTO: 85.4 FL — SIGNIFICANT CHANGE UP (ref 80–100)
MYCOPHENOLATE SERPL-MCNC: 2.1 UG/ML — SIGNIFICANT CHANGE UP (ref 1–3.5)
MYCOPHENOLIC ACID GLUCURONIDE: 48 UG/ML — SIGNIFICANT CHANGE UP (ref 15–125)
NRBC BLD AUTO-RTO: 0 /100 WBCS — SIGNIFICANT CHANGE UP (ref 0–0)
PHOSPHATE SERPL-MCNC: 3.6 MG/DL — SIGNIFICANT CHANGE UP (ref 2.5–4.5)
PLATELET # BLD AUTO: 216 K/UL — SIGNIFICANT CHANGE UP (ref 150–400)
POTASSIUM SERPL-MCNC: 3.8 MMOL/L — SIGNIFICANT CHANGE UP (ref 3.5–5.3)
POTASSIUM SERPL-SCNC: 3.8 MMOL/L — SIGNIFICANT CHANGE UP (ref 3.5–5.3)
RBC # BLD: 4.32 M/UL — SIGNIFICANT CHANGE UP (ref 4.2–5.8)
RBC # FLD: 13.4 % — SIGNIFICANT CHANGE UP (ref 10.3–14.5)
SODIUM SERPL-SCNC: 138 MMOL/L — SIGNIFICANT CHANGE UP (ref 135–145)
WBC # BLD: 5.12 K/UL — SIGNIFICANT CHANGE UP (ref 3.8–10.5)
WBC # FLD AUTO: 5.12 K/UL — SIGNIFICANT CHANGE UP (ref 3.8–10.5)

## 2025-04-01 PROCEDURE — 85025 COMPLETE CBC W/AUTO DIFF WBC: CPT

## 2025-04-01 PROCEDURE — 87186 SC STD MICRODIL/AGAR DIL: CPT

## 2025-04-01 PROCEDURE — 81001 URINALYSIS AUTO W/SCOPE: CPT

## 2025-04-01 PROCEDURE — 76775 US EXAM ABDO BACK WALL LIM: CPT

## 2025-04-01 PROCEDURE — 83735 ASSAY OF MAGNESIUM: CPT

## 2025-04-01 PROCEDURE — 80180 DRUG SCRN QUAN MYCOPHENOLATE: CPT

## 2025-04-01 PROCEDURE — 99285 EMERGENCY DEPT VISIT HI MDM: CPT

## 2025-04-01 PROCEDURE — 80197 ASSAY OF TACROLIMUS: CPT

## 2025-04-01 PROCEDURE — 96365 THER/PROPH/DIAG IV INF INIT: CPT

## 2025-04-01 PROCEDURE — 99232 SBSQ HOSP IP/OBS MODERATE 35: CPT

## 2025-04-01 PROCEDURE — 80061 LIPID PANEL: CPT

## 2025-04-01 PROCEDURE — 80048 BASIC METABOLIC PNL TOTAL CA: CPT

## 2025-04-01 PROCEDURE — 80053 COMPREHEN METABOLIC PANEL: CPT

## 2025-04-01 PROCEDURE — 36415 COLL VENOUS BLD VENIPUNCTURE: CPT

## 2025-04-01 PROCEDURE — 85027 COMPLETE CBC AUTOMATED: CPT

## 2025-04-01 PROCEDURE — 83036 HEMOGLOBIN GLYCOSYLATED A1C: CPT

## 2025-04-01 PROCEDURE — 87086 URINE CULTURE/COLONY COUNT: CPT

## 2025-04-01 PROCEDURE — 87040 BLOOD CULTURE FOR BACTERIA: CPT

## 2025-04-01 PROCEDURE — 83605 ASSAY OF LACTIC ACID: CPT

## 2025-04-01 PROCEDURE — 99239 HOSP IP/OBS DSCHRG MGMT >30: CPT

## 2025-04-01 PROCEDURE — G0545: CPT

## 2025-04-01 PROCEDURE — 71045 X-RAY EXAM CHEST 1 VIEW: CPT

## 2025-04-01 PROCEDURE — 84100 ASSAY OF PHOSPHORUS: CPT

## 2025-04-01 RX ORDER — CEFPODOXIME PROXETIL 200 MG/1
2 TABLET, FILM COATED ORAL
Qty: 32 | Refills: 0
Start: 2025-04-01 | End: 2025-04-08

## 2025-04-01 RX ADMIN — MYCOPHENOLATE MOFETIL 500 MILLIGRAM(S): 500 TABLET, FILM COATED ORAL at 05:07

## 2025-04-01 RX ADMIN — PREDNISONE 5 MILLIGRAM(S): 20 TABLET ORAL at 05:07

## 2025-04-01 RX ADMIN — ENTECAVIR 0.5 MILLIGRAM(S): 0.5 TABLET ORAL at 11:19

## 2025-04-01 RX ADMIN — Medication 81 MILLIGRAM(S): at 11:19

## 2025-04-01 RX ADMIN — HEPARIN SODIUM 5000 UNIT(S): 1000 INJECTION INTRAVENOUS; SUBCUTANEOUS at 05:08

## 2025-04-01 RX ADMIN — TACROLIMUS 2 MILLIGRAM(S): 0.5 CAPSULE ORAL at 08:57

## 2025-04-01 NOTE — CHART NOTE - NSCHARTNOTEFT_GEN_A_CORE
called patient's PCP office @ the listed number. Spoke w/ Dr. Rizzo and updated him about patient's hospital stay and necessary follow up

## 2025-04-01 NOTE — PROGRESS NOTE ADULT - SUBJECTIVE AND OBJECTIVE BOX
Subjective/Objective:      MEDS  acetaminophen     Tablet .. 650 milliGRAM(s) Oral every 6 hours PRN  aspirin  chewable 81 milliGRAM(s) Oral daily  atorvastatin 40 milliGRAM(s) Oral at bedtime  cefTRIAXone   IVPB 1000 milliGRAM(s) IV Intermittent every 24 hours (D2)  entecavir 0.5 milliGRAM(s) Oral daily  heparin   Injectable 5000 Unit(s) SubCutaneous every 12 hours  influenza  Vaccine (HIGH DOSE) 0.5 milliLiter(s) IntraMuscular once  mycophenolate mofetil 500 milliGRAM(s) Oral two times a day  predniSONE   Tablet 5 milliGRAM(s) Oral daily  tacrolimus ER Tablet (ENVARSUS XR) 2 milliGRAM(s) Oral daily  tamsulosin 0.4 milliGRAM(s) Oral at bedtime  cefepime 3/28-3/31)      PHYSICAL EXAM:    Vital Signs Last 24 Hrs  T(C): 36.6 (01 Apr 2025 05:01), Max: 36.9 (31 Mar 2025 13:13)  T(F): 97.8 (01 Apr 2025 05:01), Max: 98.5 (31 Mar 2025 13:13)  HR: 78 (01 Apr 2025 05:01) (78 - 85)  BP: 142/85 (01 Apr 2025 05:01) (130/73 - 142/85)  BP(mean): 97 (31 Mar 2025 13:13) (97 - 97)  RR: 17 (01 Apr 2025 05:01) (16 - 18)  SpO2: 97% (01 Apr 2025 05:01) (95% - 97%)    Parameters below as of 01 Apr 2025 05:01  Patient On (Oxygen Delivery Method): room air        GEN:    HEENT:    CHEST/Respiratory:    Cardiovascular:    Abdomen:    Genitourinary:    Extremities:     Neurological:    Skin:      LABS/DIAGNOSTIC TESTS                            12.2   5.12  )-----------( 216      ( 01 Apr 2025 06:02 )             36.9       WBC Count: 5.12 K/uL (04-01 @ 06:02)  WBC Count: 6.60 K/uL (03-31 @ 05:27)  WBC Count: 9.44 K/uL (03-30 @ 07:55)      04-01    138  |  109[H]  |  38[H]  ----------------------------<  116[H]  3.8   |  22  |  1.52[H]    Ca    9.2      01 Apr 2025 06:02  Phos  3.6     04-01  Mg     1.8     03-31    TPro  6.6  /  Alb  2.7[L]  /  TBili  0.4  /  DBili  x   /  AST  18  /  ALT  23  /  AlkPhos  89  03-31      CULTURES      Culture - Blood (collected 03-28-25 @ 19:10)  Source: Blood Blood-Peripheral  Preliminary Report (04-01-25 @ 02:01):    No growth at 72 Hours    Culture - Urine (collected 03-28-25 @ 19:10)  Source: Clean Catch Clean Catch (Midstream)  Final Report (03-31-25 @ 11:01):    >100,000 CFU/ml Escherichia coli  Organism: Escherichia coli (03-31-25 @ 11:01)  Organism: Escherichia coli (03-31-25 @ 11:01)      Method Type: BERNICE      -  Amoxicillin/Clavulanic Acid: S <=8/4      -  Ampicillin: S <=8 These ampicillin results predict results for amoxicillin      -  Ampicillin/Sulbactam: S <=4/2      -  Aztreonam: S <=4      -  Cefazolin: S <=2 For uncomplicated UTI with K. pneumoniae, E. coli, or P. mirablis: BERNICE <=16 is sensitive and BERNICE >=32 is resistant. This also predicts results for oral agents cefaclor, cefdinir, cefpodoxime, cefprozil, cefuroxime axetil, cephalexin and locarbef for uncomplicated UTI. Note that some isolates may be susceptible to these agents while testing resistant to cefazolin.      -  Cefepime: S <=2      -  Cefoxitin: S <=8      -  Ceftriaxone: S <=1      -  Cefuroxime: S <=4      -  Ciprofloxacin: S <=0.25      -  Ertapenem: S <=0.5      -  Gentamicin: S <=2      -  Imipenem: S <=1      -  Levofloxacin: S <=0.5      -  Meropenem: S <=1      -  Nitrofurantoin: S <=32 Should not be used to treat pyelonephritis      -  Piperacillin/Tazobactam: S <=8      -  Tobramycin: S <=2      -  Trimethoprim/Sulfamethoxazole: S <=0.5/9.5    Culture - Blood (collected 03-28-25 @ 19:00)  Source: Blood Blood-Peripheral  Preliminary Report (04-01-25 @ 02:01):    No growth at 72 Hours          RADIOLOGY               Subjective/Objective (son again helping with Ethiopian translation as per pt's request):  pt has no complaints. Had A/P U-S 2 days ago with results pending.       MEDS  acetaminophen     Tablet .. 650 milliGRAM(s) Oral every 6 hours PRN  aspirin  chewable 81 milliGRAM(s) Oral daily  atorvastatin 40 milliGRAM(s) Oral at bedtime  cefTRIAXone   IVPB 1000 milliGRAM(s) IV Intermittent every 24 hours (D2)  entecavir 0.5 milliGRAM(s) Oral daily  heparin   Injectable 5000 Unit(s) SubCutaneous every 12 hours  influenza  Vaccine (HIGH DOSE) 0.5 milliLiter(s) IntraMuscular once  mycophenolate mofetil 500 milliGRAM(s) Oral two times a day  predniSONE   Tablet 5 milliGRAM(s) Oral daily  tacrolimus ER Tablet (ENVARSUS XR) 2 milliGRAM(s) Oral daily  tamsulosin 0.4 milliGRAM(s) Oral at bedtime  cefepime 3/28-3/31)      PHYSICAL EXAM:    Vital Signs Last 24 Hrs  T(C): 36.6 (01 Apr 2025 05:01), Max: 36.9 (31 Mar 2025 13:13)  T(F): 97.8 (01 Apr 2025 05:01), Max: 98.5 (31 Mar 2025 13:13)  HR: 78 (01 Apr 2025 05:01) (78 - 85)  BP: 142/85 (01 Apr 2025 05:01) (130/73 - 142/85)  BP(mean): 97 (31 Mar 2025 13:13) (97 - 97)  RR: 17 (01 Apr 2025 05:01) (16 - 18)  SpO2: 97% (01 Apr 2025 05:01) (95% - 97%)    Parameters below as of 01 Apr 2025 05:01  Patient On (Oxygen Delivery Method): room air      Gen: alert, responsive and in NAD    HEENT: NC/At; conj. clear    Neck: supple    Back: no vert or CVA tenderness    Chest/Thorax: clear to auscultation    Cardiovascular: S1S2 reg with no m, g, r    ABD: L sided lower abd scar; cd not palpate a kidney; soft and non-tender with active BS    Genitourinary: no moreno    Extremities: no LE swelling, redness     Neurological: A+0x3; toes downgoing    Skin: no rashes    Psychiatric: affect appropriate        LABS/DIAGNOSTIC TESTS                            12.2   5.12  )-----------( 216      ( 01 Apr 2025 06:02 )             36.9       WBC Count: 5.12 K/uL (04-01 @ 06:02)  WBC Count: 6.60 K/uL (03-31 @ 05:27)  WBC Count: 9.44 K/uL (03-30 @ 07:55)      04-01    138  |  109[H]  |  38[H]  ----------------------------<  116[H]  3.8   |  22  |  1.52[H]    Ca    9.2      01 Apr 2025 06:02  Phos  3.6     04-01  Mg     1.8     03-31    TPro  6.6  /  Alb  2.7[L]  /  TBili  0.4  /  DBili  x   /  AST  18  /  ALT  23  /  AlkPhos  89  03-31      CULTURES      Culture - Blood (collected 03-28-25 @ 19:10)  Source: Blood Blood-Peripheral  Preliminary Report (04-01-25 @ 02:01):    No growth at 72 Hours    Culture - Urine (collected 03-28-25 @ 19:10)  Source: Clean Catch Clean Catch (Midstream)  Final Report (03-31-25 @ 11:01):    >100,000 CFU/ml Escherichia coli  Organism: Escherichia coli (03-31-25 @ 11:01)  Organism: Escherichia coli (03-31-25 @ 11:01)      Method Type: BERNICE      -  Amoxicillin/Clavulanic Acid: S <=8/4      -  Ampicillin: S <=8 These ampicillin results predict results for amoxicillin      -  Ampicillin/Sulbactam: S <=4/2      -  Aztreonam: S <=4      -  Cefazolin: S <=2 For uncomplicated UTI with K. pneumoniae, E. coli, or P. mirablis: BERNICE <=16 is sensitive and BERNICE >=32 is resistant. This also predicts results for oral agents cefaclor, cefdinir, cefpodoxime, cefprozil, cefuroxime axetil, cephalexin and locarbef for uncomplicated UTI. Note that some isolates may be susceptible to these agents while testing resistant to cefazolin.      -  Cefepime: S <=2      -  Cefoxitin: S <=8      -  Ceftriaxone: S <=1      -  Cefuroxime: S <=4      -  Ciprofloxacin: S <=0.25      -  Ertapenem: S <=0.5      -  Gentamicin: S <=2      -  Imipenem: S <=1      -  Levofloxacin: S <=0.5      -  Meropenem: S <=1      -  Nitrofurantoin: S <=32 Should not be used to treat pyelonephritis      -  Piperacillin/Tazobactam: S <=8      -  Tobramycin: S <=2      -  Trimethoprim/Sulfamethoxazole: S <=0.5/9.5    Culture - Blood (collected 03-28-25 @ 19:00)  Source: Blood Blood-Peripheral  Preliminary Report (04-01-25 @ 02:01):    No growth at 72 Hours          RADIOLOGY               Subjective/Objective (son again helping with Mozambican translation as per pt's request):  pt has no complaints. Had renal u/s 2d ago.       MEDS  acetaminophen     Tablet .. 650 milliGRAM(s) Oral every 6 hours PRN  aspirin  chewable 81 milliGRAM(s) Oral daily  atorvastatin 40 milliGRAM(s) Oral at bedtime  cefTRIAXone   IVPB 1000 milliGRAM(s) IV Intermittent every 24 hours (D2)  entecavir 0.5 milliGRAM(s) Oral daily  heparin   Injectable 5000 Unit(s) SubCutaneous every 12 hours  influenza  Vaccine (HIGH DOSE) 0.5 milliLiter(s) IntraMuscular once  mycophenolate mofetil 500 milliGRAM(s) Oral two times a day  predniSONE   Tablet 5 milliGRAM(s) Oral daily  tacrolimus ER Tablet (ENVARSUS XR) 2 milliGRAM(s) Oral daily  tamsulosin 0.4 milliGRAM(s) Oral at bedtime  cefepime 3/28-3/31)      PHYSICAL EXAM:    Vital Signs Last 24 Hrs  T(C): 36.6 (01 Apr 2025 05:01), Max: 36.9 (31 Mar 2025 13:13)  T(F): 97.8 (01 Apr 2025 05:01), Max: 98.5 (31 Mar 2025 13:13)  HR: 78 (01 Apr 2025 05:01) (78 - 85)  BP: 142/85 (01 Apr 2025 05:01) (130/73 - 142/85)  BP(mean): 97 (31 Mar 2025 13:13) (97 - 97)  RR: 17 (01 Apr 2025 05:01) (16 - 18)  SpO2: 97% (01 Apr 2025 05:01) (95% - 97%)    Parameters below as of 01 Apr 2025 05:01  Patient On (Oxygen Delivery Method): room air      Gen: alert, responsive and in NAD    HEENT: NC/At; conj. clear    Neck: supple    Back: no vert or CVA tenderness    Chest/Thorax: clear to auscultation    Cardiovascular: S1S2 reg with no m, g, r    ABD: L sided lower abd scar; cd not palpate a kidney; soft and non-tender with active BS    Genitourinary: no moreno    Extremities: no LE swelling, redness     Neurological: A+0x3; toes downgoing    Skin: no rashes    Psychiatric: affect appropriate        LABS/DIAGNOSTIC TESTS                            12.2   5.12  )-----------( 216      ( 01 Apr 2025 06:02 )             36.9       WBC Count: 5.12 K/uL (04-01 @ 06:02)  WBC Count: 6.60 K/uL (03-31 @ 05:27)  WBC Count: 9.44 K/uL (03-30 @ 07:55)      04-01    138  |  109[H]  |  38[H]  ----------------------------<  116[H]  3.8   |  22  |  1.52[H]    Ca    9.2      01 Apr 2025 06:02  Phos  3.6     04-01  Mg     1.8     03-31    TPro  6.6  /  Alb  2.7[L]  /  TBili  0.4  /  DBili  x   /  AST  18  /  ALT  23  /  AlkPhos  89  03-31      CULTURES      Culture - Blood (collected 03-28-25 @ 19:10)  Source: Blood Blood-Peripheral  Preliminary Report (04-01-25 @ 02:01):    No growth at 72 Hours    Culture - Urine (collected 03-28-25 @ 19:10)  Source: Clean Catch Clean Catch (Midstream)  Final Report (03-31-25 @ 11:01):    >100,000 CFU/ml Escherichia coli  Organism: Escherichia coli (03-31-25 @ 11:01)  Organism: Escherichia coli (03-31-25 @ 11:01)      Method Type: BERNICE      -  Amoxicillin/Clavulanic Acid: S <=8/4      -  Ampicillin: S <=8 These ampicillin results predict results for amoxicillin      -  Ampicillin/Sulbactam: S <=4/2      -  Aztreonam: S <=4      -  Cefazolin: S <=2 For uncomplicated UTI with K. pneumoniae, E. coli, or P. mirablis: BERNICE <=16 is sensitive and BERNICE >=32 is resistant. This also predicts results for oral agents cefaclor, cefdinir, cefpodoxime, cefprozil, cefuroxime axetil, cephalexin and locarbef for uncomplicated UTI. Note that some isolates may be susceptible to these agents while testing resistant to cefazolin.      -  Cefepime: S <=2      -  Cefoxitin: S <=8      -  Ceftriaxone: S <=1      -  Cefuroxime: S <=4      -  Ciprofloxacin: S <=0.25      -  Ertapenem: S <=0.5      -  Gentamicin: S <=2      -  Imipenem: S <=1      -  Levofloxacin: S <=0.5      -  Meropenem: S <=1      -  Nitrofurantoin: S <=32 Should not be used to treat pyelonephritis      -  Piperacillin/Tazobactam: S <=8      -  Tobramycin: S <=2      -  Trimethoprim/Sulfamethoxazole: S <=0.5/9.5    Culture - Blood (collected 03-28-25 @ 19:00)  Source: Blood Blood-Peripheral  Preliminary Report (04-01-25 @ 02:01):    No growth at 72 Hours          RADIOLOGY

## 2025-04-01 NOTE — DISCHARGE NOTE NURSING/CASE MANAGEMENT/SOCIAL WORK - FINANCIAL ASSISTANCE
[facility] provides services at a reduced cost to those who are determined to be eligible through [facility]’s financial assistance program. Information regarding [facility]’s financial assistance program can be found by going to [email or web address] or by calling [phone].

## 2025-04-01 NOTE — PROGRESS NOTE ADULT - ASSESSMENT
70y M with PMH of HTN, HLD, CAD w/ stents, MTHFR mutation, Shingles, IgA nephropathy leading to ESRD previously on HD, now s/p renal transplant in 2021 presenting with fever, chills, and dysuria. Had been seen in our ER 2/13 for similar complaints and discharged on cefuroxime. Pt feeling much better. U/A + with urine culture growing broadly susceptible E. coli. BCs neg. Pt. had abd/pelvis U/S today with results pending. Abs was changed today with ceftriaxone substituting for cefepime. Had A/P U-S 2d ago with results pending.     #UTI (see above): did not meet sepsis criteria-- pt's urine Cx growing E. coli  --awaiting U/S results  --cont. CTX for now    #S/P renal Tx  --cont. immunosuppressive meds     #CAD    Chart reviewed, including notes/labs/Cx; pt examined at the bedside; discussed the Dx/management of UTI with the pt, his son, and Dr. Moe; provided coordination of care re ID issues with Dr. Mcknight.     70y M with PMH of HTN, HLD, CAD w/ stents, MTHFR mutation, Shingles, IgA nephropathy leading to ESRD previously on HD, now s/p renal transplant in 2021 presenting with fever, chills, and dysuria. Had been seen in our ER 2/13 for similar complaints and discharged on cefuroxime. Pt feeling much better. U/A + with urine culture growing broadly susceptible E. coli. BCs neg. Pt. had abd/pelvis U/S today with results pending. Abs was changed today with ceftriaxone substituting for cefepime. Had A/P U-S 2d ago. Spoke with Dr. Macias (Rad) re renal U/S: + cyst in transplanted kidney, no hydronephrosis in native or transplanted kidneys.     #UTI (see above): did not meet sepsis criteria-- pt's urine Cx growing E. coli with broad susceptibilities  --d/c CTX  --start cefpodoxime 400mg p.o. bid and cont. for 8d    #S/P renal Tx  --cont. immunosuppressive meds     #CAD    Chart reviewed, including notes/labs/Cx; pt examined at the bedside; discussed the Dx/management of UTI with the pt, his son, and Dr. Moe; provided coordination of care re ID issues with Dr. Mcknight. Please call again if needed.

## 2025-04-01 NOTE — PROGRESS NOTE ADULT - REASON FOR ADMISSION
sepsis 2/2 uti (recurrent)
sepsis 2/2 uti (recurrent)
sepsis 2/2 uti (recurrent), complicated
sepsis 2/2 uti (recurrent)
sepsis 2/2 uti (recurrent)

## 2025-04-01 NOTE — DISCHARGE NOTE NURSING/CASE MANAGEMENT/SOCIAL WORK - PATIENT PORTAL LINK FT
You can access the FollowMyHealth Patient Portal offered by [facility] by registering at the following website: [email or web address]. By joining [facility]’s FollowMyHealth portal, you will also be able to view your health information using other applications (apps) compatible with our system.
